# Patient Record
Sex: FEMALE | Race: WHITE | NOT HISPANIC OR LATINO | Employment: OTHER | URBAN - METROPOLITAN AREA
[De-identification: names, ages, dates, MRNs, and addresses within clinical notes are randomized per-mention and may not be internally consistent; named-entity substitution may affect disease eponyms.]

---

## 2017-07-24 ENCOUNTER — OFFICE VISIT (OUTPATIENT)
Dept: OPHTHALMOLOGY | Facility: CLINIC | Age: 62
End: 2017-07-24
Payer: COMMERCIAL

## 2017-07-24 DIAGNOSIS — E11.9 TYPE 2 DIABETES MELLITUS WITHOUT RETINOPATHY: Primary | ICD-10-CM

## 2017-07-24 DIAGNOSIS — H40.003 GLAUCOMA SUSPECT OF BOTH EYES: ICD-10-CM

## 2017-07-24 DIAGNOSIS — H52.223 REGULAR ASTIGMATISM OF BOTH EYES: ICD-10-CM

## 2017-07-24 PROCEDURE — 92133 CPTRZD OPH DX IMG PST SGM ON: CPT | Mod: S$GLB,,, | Performed by: OPTOMETRIST

## 2017-07-24 PROCEDURE — 92014 COMPRE OPH EXAM EST PT 1/>: CPT | Mod: S$GLB,,, | Performed by: OPTOMETRIST

## 2017-07-24 PROCEDURE — 92015 DETERMINE REFRACTIVE STATE: CPT | Mod: S$GLB,,, | Performed by: OPTOMETRIST

## 2017-07-24 RX ORDER — SIMVASTATIN 10 MG/1
10 TABLET, FILM COATED ORAL NIGHTLY
COMMUNITY
End: 2018-10-11

## 2017-07-24 RX ORDER — LISINOPRIL 10 MG/1
10 TABLET ORAL DAILY
COMMUNITY
End: 2018-11-19

## 2017-07-24 NOTE — PROGRESS NOTES
HPI     Diabetic Eye Exam    Additional comments: Yearly           Comments   Last seen by DNL on 7/18/16 for DM exam . Patient here today for yearly DM   exam and mOCT.  1. DM  HPI    Any vision changes since last exam: No  Eye pain: No  Other ocular symptoms: No    Do you wear currently wear glasses or contacts? Glasses    Interested in contacts today? No    Do you plan on getting new glasses today? If needed  Diabetic eye exam  Diagnosed with diabetes 10 years ago  Recent vision fluctuations No  Blood sugar: 99 this weeks           Last edited by Arianna Hunt, PCT on 7/24/2017 10:15 AM. (History)              Assessment /Plan     For exam results, see Encounter Report.    Type 2 diabetes mellitus without retinopathy  No diabetic retinopathy in either eye today. Reviewed importance of yearly dilated eye exams. Continue close care with PCP regarding diabetes.    Glaucoma suspect of both eyes  -     Posterior Segment OCT Optic Nerve- Both eyes  IOP stable today but slight change temporally OD on gOCT compared to baseline  Monitor closely  RTC for 24-2VF 1 month    Regular astigmatism of both eyes  Eyeglass Final Rx     Eyeglass Final Rx       Sphere Cylinder Axis Dist VA Add    Right +0.25 +0.75 180 20/20-1 +2.50    Left [place] +1.00 180 20/20 +2.50    Expiration Date:  7/25/2018                    RTC for 24-2VF and review, 1 month  Discussed above and all questions were answered.

## 2018-03-14 ENCOUNTER — OFFICE VISIT (OUTPATIENT)
Dept: OPHTHALMOLOGY | Facility: CLINIC | Age: 63
End: 2018-03-14
Payer: COMMERCIAL

## 2018-03-14 DIAGNOSIS — H43.819 POSTERIOR VITREOUS DETACHMENT, UNSPECIFIED LATERALITY: Primary | ICD-10-CM

## 2018-03-14 PROCEDURE — 92014 COMPRE OPH EXAM EST PT 1/>: CPT | Mod: S$GLB,,, | Performed by: OPTOMETRIST

## 2018-03-14 PROCEDURE — 99999 PR PBB SHADOW E&M-EST. PATIENT-LVL I: CPT | Mod: PBBFAC,,, | Performed by: OPTOMETRIST

## 2018-03-14 NOTE — PROGRESS NOTES
HPI     Last DNL 07/24/2017  Chief: flashes of light left eye  Onset: last night  Patient states she saw flashes of white light in the lateral corner of   vision OS that lasted for a second or two with a duration of about 30   minutes   Patient states that she had been nervous and then had eaten some greasy   pizza and later had a violent bout of vomiting   Patient states she noticed the flashes this morning upon awakening but has   not seen them as of late  Patient has not experienced any blurred vision  Patient states that left eye feels tight (dry)    Last edited by Darien Lange MA on 3/14/2018  2:27 PM. (History)            Assessment /Plan     For exam results, see Encounter Report.    Posterior vitreous detachment, unspecified laterality      PVD OS without retinal findings.  No holes, tears, or heme.  RTC 4- 6 weeks to [place] to double check her retina.  S/S of RD reviewed as emergent.

## 2018-04-23 ENCOUNTER — OFFICE VISIT (OUTPATIENT)
Dept: OPHTHALMOLOGY | Facility: CLINIC | Age: 63
End: 2018-04-23
Payer: COMMERCIAL

## 2018-04-23 DIAGNOSIS — E11.9 TYPE 2 DIABETES MELLITUS WITHOUT COMPLICATION, WITHOUT LONG-TERM CURRENT USE OF INSULIN: ICD-10-CM

## 2018-04-23 DIAGNOSIS — H43.812 POSTERIOR VITREOUS DETACHMENT OF LEFT EYE: Primary | ICD-10-CM

## 2018-04-23 PROBLEM — N18.30 CHRONIC KIDNEY DISEASE, STAGE III (MODERATE): Status: ACTIVE | Noted: 2017-08-17

## 2018-04-23 PROBLEM — I10 HYPERTENSION: Status: ACTIVE | Noted: 2018-04-23

## 2018-04-23 PROBLEM — E78.5 HYPERLIPIDEMIA: Status: ACTIVE | Noted: 2018-04-23

## 2018-04-23 PROCEDURE — 99999 PR PBB SHADOW E&M-EST. PATIENT-LVL II: CPT | Mod: PBBFAC,,, | Performed by: OPHTHALMOLOGY

## 2018-04-23 PROCEDURE — 92012 INTRM OPH EXAM EST PATIENT: CPT | Mod: S$GLB,,, | Performed by: OPHTHALMOLOGY

## 2018-04-23 NOTE — PROGRESS NOTES
===============================  04/23/2018   Juliana Forrester,   62 y.o. female   Last visit JC: :Visit date not found   Last visit eye dept. 3/14/2018  [facility]:  Corrected distance visual acuity was 20/30 in the right eye and 20/20 in the left eye.  Tonometry     Tonometry (Applanation, 3/14/18)       Right Left    Pressure 15 15               Not recorded         Not recorded        Chief Complaint   Patient presents with    flashes of light     pvd eval per mlc        HPI     flashes of light    Additional comments: pvd eval per mlc           Comments   No dm  Trace ns ou  Physiologically large discs 0.5/0.45  Pvd os       Last edited by SAHRA Dow on 4/23/2018  1:56 PM. (History)          ________________  4/23/2018  Problem List Items Addressed This Visit        Eye/Vision problems    Posterior vitreous detachment of left eye - Primary      Other Visit Diagnoses     Type 2 diabetes mellitus without complication, without long-term current use of insulin              .no holes or tears in retina  Expect resolution  Reviewed s/s RT, RD  Home number given, instructed   To call with any worsening  Otherwise rtc with Dr. Glass as scheduled.         ===========================

## 2018-08-28 ENCOUNTER — OFFICE VISIT (OUTPATIENT)
Dept: OPHTHALMOLOGY | Facility: CLINIC | Age: 63
End: 2018-08-28
Payer: COMMERCIAL

## 2018-08-28 DIAGNOSIS — H40.023 OAG (OPEN ANGLE GLAUCOMA) SUSPECT, HIGH RISK, BILATERAL: ICD-10-CM

## 2018-08-28 DIAGNOSIS — H53.411 VISUAL FIELD SCOTOMA OF RIGHT EYE: Primary | ICD-10-CM

## 2018-08-28 DIAGNOSIS — E11.9 TYPE 2 DIABETES MELLITUS WITHOUT RETINOPATHY: ICD-10-CM

## 2018-08-28 DIAGNOSIS — H52.223 REGULAR ASTIGMATISM OF BOTH EYES: ICD-10-CM

## 2018-08-28 PROCEDURE — 92014 COMPRE OPH EXAM EST PT 1/>: CPT | Mod: S$GLB,,, | Performed by: OPTOMETRIST

## 2018-08-28 PROCEDURE — 92015 DETERMINE REFRACTIVE STATE: CPT | Mod: S$GLB,,, | Performed by: OPTOMETRIST

## 2018-08-28 PROCEDURE — 99999 PR PBB SHADOW E&M-EST. PATIENT-LVL II: CPT | Mod: PBBFAC,,, | Performed by: OPTOMETRIST

## 2018-08-28 PROCEDURE — 92133 CPTRZD OPH DX IMG PST SGM ON: CPT | Mod: S$GLB,,, | Performed by: OPTOMETRIST

## 2018-08-28 PROCEDURE — 92083 EXTENDED VISUAL FIELD XM: CPT | Mod: S$GLB,,, | Performed by: OPTOMETRIST

## 2018-08-28 RX ORDER — METFORMIN HYDROCHLORIDE 500 MG/1
500 TABLET, EXTENDED RELEASE ORAL DAILY
COMMUNITY
Start: 2018-08-20 | End: 2019-11-12

## 2018-08-28 RX ORDER — LATANOPROST 50 UG/ML
1 SOLUTION/ DROPS OPHTHALMIC NIGHTLY
Qty: 1 BOTTLE | Refills: 4 | Status: SHIPPED | OUTPATIENT
Start: 2018-08-28 | End: 2018-09-06

## 2018-08-28 NOTE — PROGRESS NOTES
HPI     PTs last visit was 4/23/18 with J[facility] for PVD OS f/u. PTs last exam with   DNL was 7/24/17.   Medication eye drops if any: none  Last HVF: 8/28/18  Last gOCT: 8/28/18  Last SDP: none  Diabetic eye exam  Diagnosed with diabetes 10+ years ago  Recent vision fluctuations no  Blood sugar: 90s  HPI    Any vision changes since last exam: no  Eye pain: no  Other ocular symptoms: no    Do you wear currently wear glasses or contacts? gls    Interested in contacts today? no    Do you plan on getting new glasses today? If needed           Last edited by Zia Glass, OD on 8/30/2018  7:53 AM. (History)            Assessment /Plan     For exam results, see Encounter Report.    Visual field scotoma of right eye  -     MRI Orbits W W/O Contrast; Future; Expected date: 08/28/2018  -     MRI Brain W WO Contrast; Future; Expected date: 08/28/2018  -     Creatinine, serum; Future; Expected date: 08/28/2018    OAG (open angle glaucoma) suspect, high risk, bilateral  -     Posterior Segment OCT Optic Nerve- Both eyes  -     Hill Visual Field - OU - Extended - Both Eyes    Type 2 diabetes mellitus without retinopathy  No diabetic retinopathy OD, OS  Continue close care with PCP  Monitor 12 months    Regular astigmatism of both eyes  Eyeglass Final Rx     Eyeglass Final Rx       Sphere Cylinder Axis Dist VA Add    Right +0.25 +1.00 008 20/50 +2.50    Left [place] +1.00 178 20/20 +2.50    Expiration Date:  8/29/2019                Other orders  -     latanoprost 0.005 % ophthalmic solution; Place 1 drop into both eyes every evening.  Dispense: 1 Bottle; Refill: 4      New VF defect OD (right monocular temporal hemianopia), respects vertical midline. VF reliable.   OS: no defects    VF defect corresponds with GCC thinning on gOCT  Consider normal tension glaucoma, but imaging recommended due to VF pattern: r/o pre chiasmal optic nerve abnormalities    Start latanoprost qhs OU  MRI scheduled  RTC 1 month for IOP check or PRN  with any problems

## 2018-09-05 ENCOUNTER — TELEPHONE (OUTPATIENT)
Dept: RADIOLOGY | Facility: HOSPITAL | Age: 63
End: 2018-09-05

## 2018-09-05 NOTE — TELEPHONE ENCOUNTER
----- Message from Oneida Bowen, RT sent at 9/5/2018  3:29 PM CDT -----  Contact: Radiology  Patient has a MRI test is scheduled for tomorrow, and we need to allow time for insurance approval, is the test medically urgent? if not medically urgent we may need to re-schedule to allow Insurance time to approve test. If you have any questions call ext [phone].  Thanks  Oneida, Radiology

## 2018-09-05 NOTE — TELEPHONE ENCOUNTER
Attempted to call pt and fill out questionnaire so we could resend her MRI order. Annabelle will need to send a new order first thing in the morning with the priority changed to stat instead of routine.

## 2018-09-06 ENCOUNTER — HOSPITAL ENCOUNTER (OUTPATIENT)
Dept: RADIOLOGY | Facility: HOSPITAL | Age: 63
Discharge: HOME | End: 2018-09-06
Attending: OPTOMETRIST
Payer: COMMERCIAL

## 2018-09-06 ENCOUNTER — TELEPHONE (OUTPATIENT)
Dept: OPHTHALMOLOGY | Facility: CLINIC | Age: 63
End: 2018-09-06

## 2018-09-06 DIAGNOSIS — H53.411 VISUAL FIELD SCOTOMA OF RIGHT EYE: ICD-10-CM

## 2018-09-06 DIAGNOSIS — H53.419 VISUAL FIELD SCOTOMA, UNSPECIFIED LATERALITY: Primary | ICD-10-CM

## 2018-09-06 PROCEDURE — 70543 MRI ORBT/FAC/NCK W/O &W/DYE: CPT | Mod: 26,,, | Performed by: RADIOLOGY

## 2018-09-06 PROCEDURE — 70543 MRI ORBT/FAC/NCK W/O &W/DYE: CPT | Mod: TC,PO

## 2018-09-06 PROCEDURE — 25500020 PHARM REV CODE 255: Mod: PO | Performed by: OPTOMETRIST

## 2018-09-06 PROCEDURE — A9585 GADOBUTROL INJECTION: HCPCS | Mod: PO | Performed by: OPTOMETRIST

## 2018-09-06 RX ORDER — GADOBUTROL 604.72 MG/ML
9.5 INJECTION INTRAVENOUS
Status: COMPLETED | OUTPATIENT
Start: 2018-09-06 | End: 2018-09-06

## 2018-09-06 RX ADMIN — GADOBUTROL 9.5 ML: 604.72 INJECTION INTRAVENOUS at 09:09

## 2018-09-07 ENCOUNTER — TELEPHONE (OUTPATIENT)
Dept: ENDOCRINOLOGY | Facility: CLINIC | Age: 63
End: 2018-09-07

## 2018-09-07 NOTE — TELEPHONE ENCOUNTER
Attempted to phone patient back but no answer. Will leave a message to have the nurse return her call to get an appointment scheduled when she returns on Monday.

## 2018-09-07 NOTE — TELEPHONE ENCOUNTER
----- Message from Kelsey Dobbins MA sent at 9/7/2018  8:38 AM CDT -----  This patient was seen with Dr. Glass in ophthalmology. A Sellar/suprasellar mass detected on her MRI this week and Dr. Glass would like for her to be seen with Dr. Rodolfo MENDOZA. Can you please contact patient and get her scheduled for a consult. Thanks in advance.

## 2018-09-10 ENCOUNTER — TELEPHONE (OUTPATIENT)
Dept: ENDOCRINOLOGY | Facility: CLINIC | Age: 63
End: 2018-09-10

## 2018-09-10 ENCOUNTER — OFFICE VISIT (OUTPATIENT)
Dept: NEUROSURGERY | Facility: CLINIC | Age: 63
End: 2018-09-10
Payer: COMMERCIAL

## 2018-09-10 VITALS
BODY MASS INDEX: 36.43 KG/M2 | WEIGHT: 218.63 LBS | SYSTOLIC BLOOD PRESSURE: 158 MMHG | HEART RATE: 91 BPM | DIASTOLIC BLOOD PRESSURE: 87 MMHG | HEIGHT: 65 IN | TEMPERATURE: 98 F

## 2018-09-10 DIAGNOSIS — D35.2 PITUITARY ADENOMA: ICD-10-CM

## 2018-09-10 PROCEDURE — 99244 OFF/OP CNSLTJ NEW/EST MOD 40: CPT | Mod: S$GLB,,, | Performed by: NEUROLOGICAL SURGERY

## 2018-09-10 PROCEDURE — 99999 PR PBB SHADOW E&M-EST. PATIENT-LVL III: CPT | Mod: PBBFAC,,, | Performed by: NEUROLOGICAL SURGERY

## 2018-09-10 NOTE — LETTER
September 12, 2018      Zia Glass, OD  [address] [place] [address]           [facility] - Neurosurgery 7th Fl  [address]  [place] [address]  Phone: [phone]          Patient: Juliana Forrester   MR Number: [id number]   YOB: 1955   Date of Visit: 9/10/2018       Dear Dr. Zia Glass:    Thank you for referring Juliana Forrester to me for evaluation. Attached you will find relevant portions of my assessment and plan of care.    If you have questions, please do not hesitate to call me. I look forward to following Juliana Forrester along with you.    Sincerely,    Racquel Mcadams MD    Enclosure  CC:  No Recipients    If you would like to receive this communication electronically, please contact [email or web address] or [phone] to request more information on [employer] Link access.    For providers and/or their staff who would like to refer a patient to [facility], please contact us through our one-stop-shop provider referral line, [facility] , at [phone].    If you feel you have received this communication in error or would no longer like to receive these types of communications, please e-mail [email or web address]

## 2018-09-12 ENCOUNTER — PATIENT MESSAGE (OUTPATIENT)
Dept: OPHTHALMOLOGY | Facility: CLINIC | Age: 63
End: 2018-09-12

## 2018-09-12 PROBLEM — D35.2 PITUITARY ADENOMA: Status: ACTIVE | Noted: 2018-09-12

## 2018-09-12 NOTE — H&P (VIEW-ONLY)
History & Physical    SUBJECTIVE:     Chief Complaint:  Visual field deficit.    History of Present Illness:  Ms. Forrester is a 62-year-old female who was referred to me by Dr. Tayo Glass.  Her past medical history is significant for hypertension, diabetes, hyperlipidemia, stage 3 chronic kidney disease, and posterior vitreous detachment of the left eye.  She states that she was going for her yearly eye exam and on examination her eye doctor found a visual field cut in the right eye which was not consistent with her previous history of vitreous detachment in the left eye.  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  The patient was therefore referred to me for evaluation and treatment.  She states that in retrospect, she does feel that her vision has been off.  She denies blurry vision or double vision but feels that there may be a defect in her peripheral vision.  She denies increased fatigue, unintentional weight gain, easy bruisability, abdominal striae, change in hand or shoe size, increased spacing of her teeth, galactorrhea, irregular menses, increased thirst, or increased urination.    Review of patient's allergies indicates:  No Known Allergies    Current Outpatient Medications   Medication Sig Dispense Refill    buPROPion (WELLBUTRIN XL) 150 MG TB24 tablet Take 150 mg by mouth once daily.  11    lisinopril 10 MG tablet Take 10 mg by mouth once daily.      metFORMIN (GLUCOPHAGE-XR) 500 MG 24 hr tablet Take 500 mg by mouth.      multivitamin capsule Take 1 capsule by mouth.      simvastatin (ZOCOR) 10 MG tablet Take 10 mg by mouth every evening.       No current facility-administered medications for this visit.        Past Medical History:   Diagnosis Date    Cataract     Hypertension      History reviewed. No pertinent surgical history.  Family History     Problem Relation (Age of Onset)    Cataracts Mother        Social History      Socioeconomic History    Marital status: Single     Spouse name: None    Number of children: None    Years of education: None    Highest education level: None   Social Needs    Financial resource strain: None    Food insecurity - worry: None    Food insecurity - inability: None    Transportation needs - medical: None    Transportation needs - non-medical: None   Occupational History    None   Tobacco Use    Smoking status: Never Smoker    Smokeless tobacco: Never Used   Substance and Sexual Activity    Alcohol use: No    Drug use: No    Sexual activity: None   Other Topics Concern    None   Social History Narrative    None       Review of Systems:  Review of Systems   Constitutional: Negative for activity change, diaphoresis, fatigue, fever and unexpected weight change.   HENT: Negative for hearing loss, nosebleeds, tinnitus and trouble swallowing.    Eyes: Positive for visual disturbance.   Respiratory: Positive for shortness of breath. Negative for cough and wheezing.    Cardiovascular: Negative for chest pain and palpitations.   Gastrointestinal: Negative for abdominal distention, abdominal pain, blood in stool, constipation, diarrhea, nausea and vomiting.   Genitourinary: Positive for dysuria. Negative for difficulty urinating, frequency and urgency.   Musculoskeletal: Negative for back pain, gait problem, joint swelling, myalgias, neck pain and neck stiffness.   Skin: Negative for color change, rash and wound.   Allergic/Immunologic: Negative for environmental allergies and food allergies.   Neurological: Negative for dizziness, seizures, facial asymmetry, speech difficulty, weakness, light-headedness, numbness and headaches.   Hematological: Does not bruise/bleed easily.   Psychiatric/Behavioral: Negative for agitation, behavioral problems, dysphoric mood and hallucinations. The patient is nervous/anxious.        OBJECTIVE:     Vital Signs  Temp: 98 °F (36.7 °C)  Pulse: 91  BP: (!)  "158/87  Pain Score: 0-No pain  Height: 5' 5" (165.1 cm)  Weight: 99.2 kg (218 lb 9.6 oz)  Body mass index is 36.38 kg/m².      Physical Exam:  Physical Exam:  Vitals reviewed.    Constitutional: She appears well-developed and well-nourished. No distress.     Eyes: Pupils are equal, round, and reactive to light. Conjunctivae and EOM are normal.     Cardiovascular: Normal rate, regular rhythm, normal pulses and no edema.     Abdominal: Soft. Bowel sounds are normal.     Skin: Skin displays no rash on trunk and no rash on extremities. Skin displays no lesions on trunk and no lesions on extremities.     Psych/Behavior: She is alert. She is oriented to person, place, and time. She has a normal mood and affect.     Musculoskeletal: Gait is normal.        Neck: Range of motion is full. There is no tenderness. Muscle strength is 5/5. Tone is normal.        Back: Range of motion is full. There is no tenderness. Muscle strength is 5/5. Tone is normal.        Right Upper Extremities: Range of motion is full. There is no tenderness. Muscle strength is 5/5. Tone is normal.        Left Upper Extremities: Range of motion is full. There is no tenderness. Muscle strength is 5/5. Tone is normal.       Right Lower Extremities: Range of motion is full. There is no tenderness. Muscle strength is 5/5. Tone is normal.        Left Lower Extremities: Range of motion is full. There is no tenderness. Muscle strength is 5/5. Tone is normal.     Neurological:        Coordination: She has a normal Romberg Test, normal finger to nose coordination and normal tandem walking coordination.        Sensory: There is no sensory deficit in the trunk. There is no sensory deficit in the extremities.        DTRs: DTRs are DTRS NORMAL AND SYMMETRICnormal and symmetric. She displays no Babinski's sign on the right side. She displays no Babinski's sign on the left side.        Cranial nerves: Cranial nerve(s) II, III, IV, V, VI, VII, VIII, IX, X, XI and XII " are intact.         Diagnostic Results:  She is an MRI of the brain available for review which I personally reviewed.  This shows a sellar and suprasellar mass with extension into the cavernous sinus on the left.  The tumor surrounds the ICA on the left.  It is homogeneously enhancing.  It measures approximately 3.7 x 2.3 x 2.5 cm.  There is abutment any basement of the optic chiasm.    ASSESSMENT/PLAN:     Ms. 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  Barring this being a pituitary prolactinoma, given the mass effect on the optic chiasm in visual field changes on formal examination which are consistent with a right min ocular temporal hemianopia, the patient needs surgery to alleviate the pressure on the optic chiasm.  If this does turn out to be a pituitary prolactinoma, we will treat with a course of bromocriptine 1st.  Otherwise, I believe the patient would benefit from a transnasal, transsphenoidal resection of pituitary adenoma with possible abdominal fat graft.  I explained the procedure in detail to the patient as well as the risks and benefits and present cons.  The patient wishes to proceed at this point.  We will get all the appropriate preoperative testing and schedule surgery in the near future.  She knows she can call with any further questions or concerns.        Note dictated with voice recognition software, please excuse any grammatical errors.

## 2018-09-13 ENCOUNTER — TELEPHONE (OUTPATIENT)
Dept: NEUROSURGERY | Facility: CLINIC | Age: 63
End: 2018-09-13

## 2018-09-13 ENCOUNTER — PATIENT MESSAGE (OUTPATIENT)
Dept: OPHTHALMOLOGY | Facility: CLINIC | Age: 63
End: 2018-09-13

## 2018-09-13 DIAGNOSIS — D35.2 PITUITARY MACROADENOMA: Primary | ICD-10-CM

## 2018-09-20 ENCOUNTER — TELEPHONE (OUTPATIENT)
Dept: NEUROSURGERY | Facility: CLINIC | Age: 63
End: 2018-09-20

## 2018-09-20 NOTE — TELEPHONE ENCOUNTER
----- Message from Keara Conrad sent at 9/20/2018  1:30 PM CDT -----  Contact: Dr. Pratt [phone]  Needs Advice    Reason for call: Dr. Flacon called to speak to someone regarding the patient's prolonged PTT.She would like to get her set up Hematology to clear her for surgery. Please contact Dr. Ibarra first thing tomorrow to discuss further.          Communication Preference:PHONE     Additional Information:

## 2018-09-21 ENCOUNTER — LAB VISIT (OUTPATIENT)
Dept: LAB | Facility: HOSPITAL | Age: 63
End: 2018-09-21
Attending: PHYSICIAN ASSISTANT
Payer: COMMERCIAL

## 2018-09-21 DIAGNOSIS — R79.1 ELEVATED PARTIAL THROMBOPLASTIN TIME (PTT): ICD-10-CM

## 2018-09-21 LAB
APTT BLDCRRT: 35.9 SEC
INR PPP: 1
PROTHROMBIN TIME: 10.1 SEC

## 2018-09-21 PROCEDURE — 85732 THROMBOPLASTIN TIME PARTIAL: CPT

## 2018-09-21 PROCEDURE — 85730 THROMBOPLASTIN TIME PARTIAL: CPT

## 2018-09-21 PROCEDURE — 85610 PROTHROMBIN TIME: CPT

## 2018-09-21 PROCEDURE — 36415 COLL VENOUS BLD VENIPUNCTURE: CPT

## 2018-09-24 ENCOUNTER — INITIAL CONSULT (OUTPATIENT)
Dept: HEMATOLOGY/ONCOLOGY | Facility: CLINIC | Age: 63
End: 2018-09-24
Payer: COMMERCIAL

## 2018-09-24 ENCOUNTER — OFFICE VISIT (OUTPATIENT)
Dept: ENDOCRINOLOGY | Facility: CLINIC | Age: 63
End: 2018-09-24
Payer: COMMERCIAL

## 2018-09-24 ENCOUNTER — LAB VISIT (OUTPATIENT)
Dept: LAB | Facility: HOSPITAL | Age: 63
End: 2018-09-24
Payer: COMMERCIAL

## 2018-09-24 VITALS
HEIGHT: 65 IN | BODY MASS INDEX: 36.29 KG/M2 | OXYGEN SATURATION: 98 % | WEIGHT: 217.81 LBS | HEART RATE: 81 BPM | SYSTOLIC BLOOD PRESSURE: 132 MMHG | TEMPERATURE: 98 F | DIASTOLIC BLOOD PRESSURE: 72 MMHG | RESPIRATION RATE: 18 BRPM

## 2018-09-24 VITALS
HEIGHT: 65 IN | WEIGHT: 217.38 LBS | BODY MASS INDEX: 36.22 KG/M2 | SYSTOLIC BLOOD PRESSURE: 128 MMHG | DIASTOLIC BLOOD PRESSURE: 79 MMHG | HEART RATE: 72 BPM

## 2018-09-24 DIAGNOSIS — R79.1 ELEVATED PARTIAL THROMBOPLASTIN TIME (PTT): Primary | ICD-10-CM

## 2018-09-24 DIAGNOSIS — Z01.818 PRE-OPERATIVE CLEARANCE: ICD-10-CM

## 2018-09-24 DIAGNOSIS — E11.9 TYPE 2 DIABETES MELLITUS WITHOUT RETINOPATHY: ICD-10-CM

## 2018-09-24 DIAGNOSIS — E78.5 HYPERLIPIDEMIA, UNSPECIFIED HYPERLIPIDEMIA TYPE: ICD-10-CM

## 2018-09-24 DIAGNOSIS — R79.1 ELEVATED PARTIAL THROMBOPLASTIN TIME (PTT): ICD-10-CM

## 2018-09-24 DIAGNOSIS — D35.2 PITUITARY MACROADENOMA: Primary | ICD-10-CM

## 2018-09-24 DIAGNOSIS — I10 HYPERTENSION, UNSPECIFIED TYPE: ICD-10-CM

## 2018-09-24 LAB — APTT BLDCRRT: 29.2 SEC

## 2018-09-24 PROCEDURE — 36415 COLL VENOUS BLD VENIPUNCTURE: CPT

## 2018-09-24 PROCEDURE — 99204 OFFICE O/P NEW MOD 45 MIN: CPT | Mod: S$GLB,,, | Performed by: INTERNAL MEDICINE

## 2018-09-24 PROCEDURE — 85730 THROMBOPLASTIN TIME PARTIAL: CPT

## 2018-09-24 PROCEDURE — 99999 PR PBB SHADOW E&M-EST. PATIENT-LVL III: CPT | Mod: PBBFAC,,, | Performed by: INTERNAL MEDICINE

## 2018-09-24 RX ORDER — LEVOTHYROXINE SODIUM 50 UG/1
50 TABLET ORAL DAILY
Qty: 30 TABLET | Refills: 11 | Status: SHIPPED | OUTPATIENT
Start: 2018-09-24 | End: 2018-10-25 | Stop reason: SDUPTHER

## 2018-09-24 NOTE — LETTER
September 24, 2018      Racquel Mcadams MD  [address]  [facility] [address]           [facility]  [address]  [facility] [address]  Phone: [phone]          Patient: Juliana Forrester   MR Number: [id number]   YOB: 1955   Date of Visit: 9/24/2018       Dear Dr. Racquel Mcadams:    Thank you for referring Juliana Forrester to me for evaluation. Attached you will find relevant portions of my assessment and plan of care.    If you have questions, please do not hesitate to call me. I look forward to following Juliana Forrester along with you.    Sincerely,    Juve Youssef MD    Enclosure  CC:  No Recipients    If you would like to receive this communication electronically, please contact [email or web address] or [phone] to request more information on [employer] Link access.    For providers and/or their staff who would like to refer a patient to [facility], please contact us through our one-stop-shop provider referral line, [facility] [place], at [phone].    If you feel you have received this communication in error or would no longer like to receive these types of communications, please e-mail [email or web address]

## 2018-09-24 NOTE — LETTER
September 24, 2018      Zia Glass, OD  [address] [place] [address]           [facility]  [address]  [facility] [address]  Phone: [phone]  Fax: [phone]          Patient: Juliana Forrester   MR Number: [id number]   YOB: 1955   Date of Visit: 9/24/2018       Dear Dr. Zia Glass:    Thank you for referring Juliana Forrester to me for evaluation. Attached you will find relevant portions of my assessment and plan of care.    If you have questions, please do not hesitate to call me. I look forward to following Juliana Forrester along with you.    Sincerely,    Reanna Alves MD    Enclosure  CC:  No Recipients    If you would like to receive this communication electronically, please contact [email or web address] or [phone] to request more information on [employer] Link access.    For providers and/or their staff who would like to refer a patient to [facility], please contact us through our one-stop-shop provider referral line, [facility], at [phone].    If you feel you have received this communication in error or would no longer like to receive these types of communications, please e-mail [email or web address]

## 2018-09-24 NOTE — PROGRESS NOTES
SECTION OF HEMATOLOGY AND BONE MARROW TRANSPLANT  New Patient Visit   09/24/2018  Referred by:  Dr. Racquel Mcadams  Referred for:      CHIEF COMPLAINT: No chief complaint on file.      HISTORY OF PRESENT ILLNESS:   61 yo female, referred for pre op clearance prior to removal of pituitary macroadenoma by neurosurgery (Dr. Mcadams).  Diagnosed after presenting with vision changes.    Otherwise asymptomatic.    She was noted to have minimally elevated aPTT and normal INR.    A repeat aPTT done this am was normal.   She gives no personal or family history of bleeding. She has cholecystectomy 2 years ago with no abnormal bleeding.  Regular dental care with no bleeding.    She does not take any anticoagulants or antiplatelet agents .   Denies fever, chills, nightsweats, bleeding, brusing, lymphadenopathy, signs/symptoms of splenomegaly.    She comes to clinic with her sister.       PAST MEDICAL HISTORY:   Past Medical History:   Diagnosis Date    Cataract     Hypertension     Pituitary adenoma 9/12/2018       PAST SURGICAL HISTORY:   No past surgical history on file.    PAST SOCIAL HISTORY:   reports that  has never smoked. she has never used smokeless tobacco. She reports that she does not drink alcohol or use drugs.    FAMILY HISTORY:  Family History   Problem Relation Age of Onset    Cataracts Mother        CURRENT MEDICATIONS:   Current Outpatient Medications   Medication Sig    levothyroxine (SYNTHROID) 50 MCG tablet Take 1 tablet (50 mcg total) by mouth once daily.    lisinopril 10 MG tablet Take 10 mg by mouth once daily.    metFORMIN (GLUCOPHAGE-XR) 500 MG 24 hr tablet Take 500 mg by mouth.    multivitamin capsule Take 1 capsule by mouth.    simvastatin (ZOCOR) 10 MG tablet Take 10 mg by mouth every evening.     No current facility-administered medications for this visit.      ALLERGIES:   Review of patient's allergies indicates:  No Known Allergies          REVIEW OF SYSTEMS:   General ROS:  negative  Psychological ROS: negative  Ophthalmic ROS: see HPI  ENT ROS: negative  Allergy and Immunology ROS: negative  Hematological and Lymphatic ROS: negative  Endocrine ROS: negative  Respiratory ROS: negative  Cardiovascular ROS: negative  Gastrointestinal ROS: negative  Genito-Urinary ROS: negative  Musculoskeletal ROS: negative  Neurological ROS: negative  Dermatological ROS: negative    PHYSICAL EXAM:   Vitals:    09/24/18 1306   BP: 132/72   Pulse: 81   Resp: 18   Temp: 97.9 °F (36.6 °C)       General - well developed, well nourished, no apparent distress  Head & Face - no sinus tenderness  Eyes - normal conjunctivae and lids   ENT - normal external auditory canals and tympanic membranes bilaterally oropharynx clear,  Normal dentition and gums  Neck - normal thyroid  Chest and Lung - normal respiratory effort, clear to auscultation bilaterally   Cardiovascular - RRR with no MGR, normal S1 and S2; no pedal edema  Abdomen -  soft, nontender, no palpable hepatomegaly or splenomegaly  Lymph - no palpable lymphadenopathy  Extremities - unremarkable nails and digits  Heme - no bruising, petechiae, pallor  Skin - no rashes or lesions  Psych - appropriate mood and affect      ECOG Performance Status: (foot note - ECOG PS provided by Eastern Cooperative Oncology Group) 1 - Symptomatic but completely ambulatory    Karnofsky Performance Score:  90%- Able to Carry on Normal Activity: Minor Symptoms of Disease  DATA:   No results found for: WBC, HGB, HCT, MCV, PLT  Lab Results   Component Value Date    INR 1.0 09/21/2018     Lab Results   Component Value Date    APTT 29.2 09/24/2018       Normal cbc  -9/18/18 -care everywhere     ASSESSMENT AND PLAN:   Encounter Diagnoses   Name Primary?    Elevated partial thromboplastin time (PTT) Yes    Pre-operative clearance      -unclear etiology of prior negligibly increased aPTT; normal INR; normal plt count  -aPTT now normalized on today's labs; this in setting of normal CBC  (please see care everywhere), no significant personal or family history of bleeding, prior surgeries without bleeding, it is  highly unlikely patient has any clinically meaningful bleeding diathesis  -she can proceed with neurosurgery with no further diagnostics/therapuetics     Follow Up: CHAPIN Youssef MD  Hematology/Oncology/Bone Marrow Transplant

## 2018-09-24 NOTE — PROGRESS NOTES
"ENDOCRINOLOGY INITIAL VISIT    Juliana Forrester is a 62 y.o. female referred by Dr. 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    HPI  During annual eye exam she was noted to have right visual field deficit which prompted MRI. This revealed large sellar mass with suprasellar extension abutting the chiasm. She notes feeling "claustrophobic" while driving recently but otherwise had not previously noted change in vision.     She has been seen by Dr. Mcadams in neurosurgery who recommends surgery given visual deficits as long as mass not found to be prolactinoma. This is tentatively planned for Wednesday of this week although she was also found to have prolonged PTT so is having evaluation by hematology as well.     Has been feeling well recently and reports only an increase in sweating particularly overnight.    Denied weight changes, appetite changes, fatigue, constipation/diarrhea. She is postmenopausal and never had significant symptoms from menopause    Denied change in facial appearance, change in ring/hat/shoe size, snoring. If anything rings have been a bit lose lately    Denied cold/heat intolerance, skin/hair changes, muscle aches +sweating as above    Denied orthostatic symptoms. Has vomitting if eats fried food but this developed following cholecystectomy and is unchanged     Denied facial edema/erythema, hirsutism/acne/voice deepening, striae/bruising    Denied galactorrhea, polyuria/polydipsia, denies unusual headaches, + right-sided peripheral vision loss. Has noted increased in floaters      MEDICATION    Current Outpatient Medications:     lisinopril 10 MG tablet, Take 10 mg by mouth once daily., Disp: , Rfl:     metFORMIN (GLUCOPHAGE-XR) 500 MG 24 hr tablet, Take 500 mg by mouth., Disp: , Rfl:     multivitamin capsule, Take 1 capsule by mouth., Disp: , Rfl:     simvastatin (ZOCOR) 10 MG tablet, Take 10 mg by mouth every evening., Disp: , Rfl:     levothyroxine (SYNTHROID) 50 MCG " "tablet, Take 1 tablet (50 mcg total) by mouth once daily., Disp: 30 tablet, Rfl: 11    ALLERGIES  Review of patient's allergies indicates:  No Known Allergies      PAST MEDICAL HISTORY  HTN  HLD  T2DM    FAMILY HISTORY  Denies family history of pituitary tumor    SOCIAL HISTORY  Denies tobacco, ETOH, illicits  Speech pathologist, worked at the [employer] in [place]    PHYSICAL EXAM  /79 (BP Location: Right arm, Patient Position: Sitting, BP Method: Medium (Automatic))   Pulse 72   Ht 5' 5" (1.651 m)   Wt 98.6 kg (217 lb 6 oz)   BMI 36.17 kg/m²   Body mass index is 36.17 kg/m².  General Appearance:  Normal appearing, pleasant, NAD  Skin:  no striae or rashes  HEENT:  EOMI, MMM, sclera anicteric   Chest and Lungs:  CTAB, no wheezes/rales/rhonchi; bilateral breasts without galactorrhea  Cardiovascular:  RRR, nml S1, S2.  No lower extremity edema  Abdomen:  soft, nontender, nondistended, bowel sounds normoactive  MSK: +buffalo hump, no dorsocervical fat pads  Neurologic:  A&O x3  Psychiatric:  normal mood and affect    LABORATORY  9/18/18:  24 hr urine cortisol 31.0   Cortisol 13.58  ACTH 16  Prolactin 65.2  IGF-1 85  FT4 0.88  TSH 1.13  Sodium 142      IMAGING STUDIES  MR orbits 8/28/18:  Orbits/Optic Nerves:  Both globes are intact.  No abnormal enhancement associated with either globe.  The optic nerves are symmetric in size and signal.  No intraconal or extraconal mass lesions are identified.    There is a large mass that demonstrates homogeneous enhancement occupying the sella that demonstrates suprasellar extension and results in at least 360° of encasement of the cavernous portion of the left and 180 -270° of abutment of the supraclinoid portion of the left ICA.  The mass measures a maximum of 3.7 by 2.3 cm in the axial plane and up to 2.5 cm in the craniocaudal dimension.  The mass results in abutment and effacement of the optic chiasm.  Findings are suspicious for a large macroadenoma with the " possibility of a craniopharyngioma or other sellar/suprasellar lesions not completely excluded.    Remainder of the Intracranial Compartment (limited evaluation): No diffusion weighted abnormalities noted.  The entire brain was not included.    Skull/Extracranial Contents (limited evaluation): Bone marrow signal intensity is normal.  1. Large sellar/suprasellar mass as described in detail above.    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 Pending completion of endocrinologic and hematologic evaluation she will undergo surgery later this week given visual symptoms.    Elevated prolactin  Prolactin mildly elevated. This is likely due to stalk compression as would expect much higher value given size of mass. Will also run with dilution to rule out Hook effect  -Repeat prolactin with dilution  -If prolactin significantly elevated consistent with prolactinoma, will start treatment with dopamine agonist  -If prolactin again only mildly elevated, recommend proceeding with surgical resection    HPA  -No evidence of adrenal insufficiency or excess    HPT  -FT4 mildly low, TSH not reliable in this context  -Start levothyroxine 50 mcg daily. Counseled on appropriate manner in which to take  -Repeat following surgery with dose titration as indicated    HPG  Postmenopausal  -Check LH/FSH    GH  IGF-1 normal. No evidence of acromegaly clinically or biochemically    DI  Sodium normal, no polyuria and polydipsia    RTC 8 weeks following surgery    Reanna Alves MD    [phone] (c) with lab results

## 2018-09-24 NOTE — PROGRESS NOTES
Prolactin run with dilution 1:100 returned at 60, unchanged from baseline. Thus not consistent with prolactinoma and patient can proceed with surgery    Called patient and relayed this result

## 2018-09-25 ENCOUNTER — ANESTHESIA EVENT (OUTPATIENT)
Dept: SURGERY | Facility: HOSPITAL | Age: 63
DRG: 614 | End: 2018-09-25
Payer: COMMERCIAL

## 2018-09-25 NOTE — PRE-PROCEDURE INSTRUCTIONS
PreOp Instructions given TO pt's sister - Terri:  - Verbal medication information (what to hold and what to take)   - NPO guidelines   - Arrival place directions given; time to be given the day before procedure by the   Surgeon's Office   - Bathing with antibacterial soap   - Don't wear any jewelry or bring any valuables AM of surgery   - No makeup or moisturizer to face   - No perfume/cologne, powder, lotions or aftershave   Pt.'s sister, Terri, verbalized understanding.   Denies any history of side effects or issues with anesthesia or sedation.

## 2018-09-25 NOTE — ANESTHESIA PREPROCEDURE EVALUATION
[facility]  Anesthesia Pre-Operative Evaluation         Patient Name: Juliana Forrester  YOB: 1955  MRN: [id number]    SUBJECTIVE:     Pre-operative evaluation for Procedure(s) (LRB):  RESECTION, NEOPLASM, PITUITARY, TRANSSPHENOIDAL APPROACH (N/A)  Scheduled for 9/26/2018    HPI 09/25/2018:  Juliana Forrester is a 62 y.o. female with hx of HTN, HLD, DM2, CKD3, vitreous detachment of L eye.    Patient was last seen in clinic by Dr. Mcadams on 9/12/2018 for pituitary adenoma.  There is a visual field defect in R eye.  No blurry vision or double vision but possible defect in peripheral vision.  Imaging shows a large sellar and suprasellar homogenously enhancing lesion with pressure on optic chiasm.  Endocrine studies not consistent with prolactinoma.    Patient presents for the above procedure(s).    Prev airway:   No prior records in Epic.    Oxygen/Ventilation Requirements:  On room air with SpO2 of 98%       Patient Active Problem List   Diagnosis    Type 2 diabetes mellitus without retinopathy    Chronic kidney disease, stage III (moderate)    Hypertension    Hyperlipidemia    Posterior vitreous detachment of left eye    Pituitary macroadenoma    Elevated partial thromboplastin time (PTT)       Review of patient's allergies indicates:  No Known Allergies    Outpatient Medications:  No current facility-administered medications on file prior to encounter.      Current Outpatient Medications on File Prior to Encounter   Medication Sig Dispense Refill    lisinopril 10 MG tablet Take 10 mg by mouth once daily.      metFORMIN (GLUCOPHAGE-XR) 500 MG 24 hr tablet Take 500 mg by mouth.      multivitamin capsule Take 1 capsule by mouth.      simvastatin (ZOCOR) 10 MG tablet Take 10 mg by mouth every evening.          Current Inpatient Medications:      No past surgical history on file.    Social History     Socioeconomic History    Marital status: Single     Spouse name: Not on file     Number of children: Not on file    Years of education: Not on file    Highest education level: Not on file   Social Needs    Financial resource strain: Not on file    Food insecurity - worry: Not on file    Food insecurity - inability: Not on file    Transportation needs - medical: Not on file    Transportation needs - non-medical: Not on file   Occupational History    Not on file   Tobacco Use    Smoking status: Never Smoker    Smokeless tobacco: Never Used   Substance and Sexual Activity    Alcohol use: No    Drug use: No    Sexual activity: Not on file   Other Topics Concern    Not on file   Social History Narrative    Not on file       OBJECTIVE:     Weight:  Wt Readings from Last 4 Encounters:   09/24/18 98.8 kg (217 lb 13 oz)   09/24/18 98.6 kg (217 lb 6 oz)   09/10/18 99.2 kg (218 lb 9.6 oz)       Vital Signs Range (Last 24H):         CBC:   No results found for: WBC, HGB, HCT, MCV, PLT    CMP:     Chemistry        Component Value Date/Time    CREATININE 1.2 09/06/2018 0723        Component Value Date/Time    ESTGFRAFRICA 56 (A) 09/06/2018 0723    EGFRNONAA 48 (A) 09/06/2018 0723            INR:  Lab Results   Component Value Date    INR 1.0 09/21/2018       Diagnostic Studies:  MRI Orbits w/wo Contrast 9/6/2018  There is a large mass that demonstrates homogeneous enhancement occupying the sella that demonstrates suprasellar extension and results in at least 360° of encasement of the cavernous portion of the left and 180 -270° of abutment of the supraclinoid portion of the left ICA.  The mass measures a maximum of 3.7 by 2.3 cm in the axial plane and up to 2.5 cm in the craniocaudal dimension.  The mass results in abutment and effacement of the optic chiasm.  Findings are suspicious for a large macroadenoma with the possibility of a craniopharyngioma or other sellar/suprasellar lesions not completely excluded.    EKG:  none     2D Echo:  No results found for this or any previous  visit.      ASSESSMENT/PLAN:         Anesthesia Evaluation    I have reviewed the Patient Summary Reports.    I have reviewed the Nursing Notes.   I have reviewed the Medications.     Review of Systems  Anesthesia Hx:  No previous Anesthesia  Neg history of prior surgery. Denies Family Hx of Anesthesia complications.   Denies Personal Hx of Anesthesia complications.   Social:  Non-Smoker, No Alcohol Use    EENT/Dental:EENT/Dental Normal   Cardiovascular:   Exercise tolerance: good Hypertension Denies Valvular problems/Murmurs.  Denies MI.  Denies CAD.    Denies CABG/stent.  Denies Dysrhythmias.   Denies Angina.     hyperlipidemia    Pulmonary:  Pulmonary Normal    Renal/:   Chronic Renal Disease, CRI    Hepatic/GI:  Hepatic/GI Normal    Musculoskeletal:  Musculoskeletal Normal    Neurological:   Denies CVA. Denies Seizures. Pituitary adenoma   Endocrine:   Diabetes    Psych:  Psychiatric Normal           Physical Exam  General:  Well nourished, Obesity    Airway/Jaw/Neck:  Airway Findings: Mouth Opening: Normal Tongue: Normal  General Airway Assessment: Adult  Mallampati: III  TM Distance: Normal, at least 6 cm  Jaw/Neck Findings:  Neck ROM: Normal ROM  Neck Findings: Normal    Eyes/Ears/Nose:  EYES/EARS/NOSE FINDINGS: Normal   Dental:  Dental Findings: In tact   Chest/Lungs:  Chest/Lungs Findings: Clear to auscultation, Normal Respiratory Rate     Heart/Vascular:  Heart Findings: Rate: Normal  Rhythm: Regular Rhythm  Sounds: Normal  Heart murmur: negative       Mental Status:  Mental Status Findings:  Cooperative, Alert and Oriented         Anesthesia Plan  Type of Anesthesia, risks & benefits discussed:  Anesthesia Type:  general  Patient's Preference:   Intra-op Monitoring Plan: standard ASA monitors and arterial line  Intra-op Monitoring Plan Comments:   Post Op Pain Control Plan: multimodal analgesia, IV/PO Opioids PRN and per primary service following discharge from PACU  Post Op Pain Control Plan Comments:    Induction:   IV  Beta Blocker:  Patient is not currently on a Beta-Blocker (No further documentation required).       Informed Consent: Patient understands risks and agrees with Anesthesia plan.  Questions answered. Anesthesia consent signed with patient.  ASA Score: 2     Day of Surgery Review of History & Physical:  There are no significant changes.  H&P update referred to the surgeon.         Ready For Surgery From Anesthesia Perspective.

## 2018-09-26 ENCOUNTER — HOSPITAL ENCOUNTER (INPATIENT)
Facility: HOSPITAL | Age: 63
LOS: 3 days | Discharge: HOME | DRG: 614 | End: 2018-09-29
Attending: NEUROLOGICAL SURGERY | Admitting: NEUROLOGICAL SURGERY
Payer: COMMERCIAL

## 2018-09-26 ENCOUNTER — ANESTHESIA (OUTPATIENT)
Dept: SURGERY | Facility: HOSPITAL | Age: 63
DRG: 614 | End: 2018-09-26
Payer: COMMERCIAL

## 2018-09-26 DIAGNOSIS — D49.7 PITUITARY TUMOR: ICD-10-CM

## 2018-09-26 PROBLEM — E03.8 OTHER SPECIFIED HYPOTHYROIDISM: Status: ACTIVE | Noted: 2018-09-26

## 2018-09-26 LAB
ABO + RH BLD: NORMAL
ANION GAP SERPL CALC-SCNC: 9 MMOL/L
BASOPHILS # BLD AUTO: 0.04 K/UL
BASOPHILS NFR BLD: 0.7 %
BLD GP AB SCN CELLS X3 SERPL QL: NORMAL
BUN SERPL-MCNC: 18 MG/DL
CALCIUM SERPL-MCNC: 8.1 MG/DL
CHLORIDE SERPL-SCNC: 109 MMOL/L
CO2 SERPL-SCNC: 23 MMOL/L
CREAT SERPL-MCNC: 0.9 MG/DL
DIFFERENTIAL METHOD: ABNORMAL
EOSINOPHIL # BLD AUTO: 0.1 K/UL
EOSINOPHIL NFR BLD: 1 %
ERYTHROCYTE [DISTWIDTH] IN BLOOD BY AUTOMATED COUNT: 13.1 %
EST. GFR  (AFRICAN AMERICAN): >60 ML/MIN/1.73 M^2
EST. GFR  (NON AFRICAN AMERICAN): >60 ML/MIN/1.73 M^2
GLUCOSE SERPL-MCNC: 132 MG/DL (ref 70–110)
GLUCOSE SERPL-MCNC: 140 MG/DL
GLUCOSE SERPL-MCNC: 89 MG/DL (ref 70–110)
HCO3 UR-SCNC: 22.6 MMOL/L (ref 24–28)
HCO3 UR-SCNC: 23.1 MMOL/L (ref 24–28)
HCT VFR BLD AUTO: 32.2 %
HCT VFR BLD CALC: 27 %PCV (ref 36–54)
HCT VFR BLD CALC: 28 %PCV (ref 36–54)
HGB BLD-MCNC: 11 G/DL
IMM GRANULOCYTES # BLD AUTO: 0.04 K/UL
IMM GRANULOCYTES NFR BLD AUTO: 0.7 %
LYMPHOCYTES # BLD AUTO: 0.9 K/UL
LYMPHOCYTES NFR BLD: 16.1 %
MCH RBC QN AUTO: 31.4 PG
MCHC RBC AUTO-ENTMCNC: 34.2 G/DL
MCV RBC AUTO: 92 FL
MONOCYTES # BLD AUTO: 0.2 K/UL
MONOCYTES NFR BLD: 4 %
NEUTROPHILS # BLD AUTO: 4.4 K/UL
NEUTROPHILS NFR BLD: 77.5 %
NRBC BLD-RTO: 0 /100 WBC
PCO2 BLDA: 32.4 MMHG (ref 35–45)
PCO2 BLDA: 33.7 MMHG (ref 35–45)
PH SMN: 7.44 [PH] (ref 7.35–7.45)
PH SMN: 7.45 [PH] (ref 7.35–7.45)
PLATELET # BLD AUTO: 272 K/UL
PMV BLD AUTO: 10.4 FL
PO2 BLDA: 92 MMHG (ref 80–100)
PO2 BLDA: 98 MMHG (ref 80–100)
POC BE: -1 MMOL/L
POC BE: -1 MMOL/L
POC IONIZED CALCIUM: 1.12 MMOL/L (ref 1.06–1.42)
POC IONIZED CALCIUM: 1.15 MMOL/L (ref 1.06–1.42)
POC SATURATED O2: 98 % (ref 95–100)
POC SATURATED O2: 98 % (ref 95–100)
POC TCO2: 24 MMOL/L (ref 23–27)
POC TCO2: 24 MMOL/L (ref 23–27)
POCT GLUCOSE: 135 MG/DL (ref 70–110)
POCT GLUCOSE: 147 MG/DL (ref 70–110)
POCT GLUCOSE: 154 MG/DL (ref 70–110)
POCT GLUCOSE: 91 MG/DL (ref 70–110)
POTASSIUM BLD-SCNC: 3.6 MMOL/L (ref 3.5–5.1)
POTASSIUM BLD-SCNC: 3.6 MMOL/L (ref 3.5–5.1)
POTASSIUM SERPL-SCNC: 4 MMOL/L
RBC # BLD AUTO: 3.5 M/UL
SAMPLE: ABNORMAL
SAMPLE: ABNORMAL
SODIUM BLD-SCNC: 142 MMOL/L (ref 136–145)
SODIUM BLD-SCNC: 143 MMOL/L (ref 136–145)
SODIUM SERPL-SCNC: 141 MMOL/L
WBC # BLD AUTO: 5.73 K/UL

## 2018-09-26 PROCEDURE — C9113 INJ PANTOPRAZOLE SODIUM, VIA: HCPCS | Performed by: STUDENT IN AN ORGANIZED HEALTH CARE EDUCATION/TRAINING PROGRAM

## 2018-09-26 PROCEDURE — 61548 REMOVAL OF PITUITARY GLAND: CPT | Mod: 62,,, | Performed by: NEUROLOGICAL SURGERY

## 2018-09-26 PROCEDURE — 99222 1ST HOSP IP/OBS MODERATE 55: CPT | Mod: ,,, | Performed by: INTERNAL MEDICINE

## 2018-09-26 PROCEDURE — 63600175 PHARM REV CODE 636 W HCPCS: Performed by: STUDENT IN AN ORGANIZED HEALTH CARE EDUCATION/TRAINING PROGRAM

## 2018-09-26 PROCEDURE — 88305 TISSUE EXAM BY PATHOLOGIST: CPT | Performed by: PATHOLOGY

## 2018-09-26 PROCEDURE — 36620 INSERTION CATHETER ARTERY: CPT | Mod: 59,,, | Performed by: ANESTHESIOLOGY

## 2018-09-26 PROCEDURE — 94761 N-INVAS EAR/PLS OXIMETRY MLT: CPT

## 2018-09-26 PROCEDURE — 86920 COMPATIBILITY TEST SPIN: CPT

## 2018-09-26 PROCEDURE — 27201037 HC PRESSURE MONITORING SET UP

## 2018-09-26 PROCEDURE — 27201423 OPTIME MED/SURG SUP & DEVICES STERILE SUPPLY: Performed by: NEUROLOGICAL SURGERY

## 2018-09-26 PROCEDURE — 99222 1ST HOSP IP/OBS MODERATE 55: CPT | Mod: ,,, | Performed by: PHYSICIAN ASSISTANT

## 2018-09-26 PROCEDURE — 71000033 HC RECOVERY, INTIAL HOUR: Performed by: NEUROLOGICAL SURGERY

## 2018-09-26 PROCEDURE — 25000003 PHARM REV CODE 250: Performed by: STUDENT IN AN ORGANIZED HEALTH CARE EDUCATION/TRAINING PROGRAM

## 2018-09-26 PROCEDURE — 36000711: Performed by: NEUROLOGICAL SURGERY

## 2018-09-26 PROCEDURE — 37000009 HC ANESTHESIA EA ADD 15 MINS: Performed by: NEUROLOGICAL SURGERY

## 2018-09-26 PROCEDURE — 88305 TISSUE EXAM BY PATHOLOGIST: CPT | Mod: 26,,, | Performed by: PATHOLOGY

## 2018-09-26 PROCEDURE — 71000039 HC RECOVERY, EACH ADD'L HOUR: Performed by: NEUROLOGICAL SURGERY

## 2018-09-26 PROCEDURE — 82962 GLUCOSE BLOOD TEST: CPT | Performed by: NEUROLOGICAL SURGERY

## 2018-09-26 PROCEDURE — 37000008 HC ANESTHESIA 1ST 15 MINUTES: Performed by: NEUROLOGICAL SURGERY

## 2018-09-26 PROCEDURE — 63600175 PHARM REV CODE 636 W HCPCS: Performed by: NEUROLOGICAL SURGERY

## 2018-09-26 PROCEDURE — 80048 BASIC METABOLIC PNL TOTAL CA: CPT

## 2018-09-26 PROCEDURE — 0GB04ZZ EXCISION OF PITUITARY GLAND, PERCUTANEOUS ENDOSCOPIC APPROACH: ICD-10-PCS | Performed by: NEUROLOGICAL SURGERY

## 2018-09-26 PROCEDURE — 25000003 PHARM REV CODE 250: Performed by: NEUROLOGICAL SURGERY

## 2018-09-26 PROCEDURE — 36000710: Performed by: NEUROLOGICAL SURGERY

## 2018-09-26 PROCEDURE — 85025 COMPLETE CBC W/AUTO DIFF WBC: CPT

## 2018-09-26 PROCEDURE — 86901 BLOOD TYPING SEROLOGIC RH(D): CPT

## 2018-09-26 PROCEDURE — D9220A PRA ANESTHESIA: Mod: ,,, | Performed by: ANESTHESIOLOGY

## 2018-09-26 PROCEDURE — 20000000 HC ICU ROOM

## 2018-09-26 RX ORDER — HYDROMORPHONE HYDROCHLORIDE 1 MG/ML
0.2 INJECTION, SOLUTION INTRAMUSCULAR; INTRAVENOUS; SUBCUTANEOUS EVERY 5 MIN PRN
Status: DISCONTINUED | OUTPATIENT
Start: 2018-09-26 | End: 2018-09-26 | Stop reason: HOSPADM

## 2018-09-26 RX ORDER — IBUPROFEN 200 MG
16 TABLET ORAL
Status: DISCONTINUED | OUTPATIENT
Start: 2018-09-26 | End: 2018-09-29 | Stop reason: HOSPADM

## 2018-09-26 RX ORDER — GLYCOPYRROLATE 0.2 MG/ML
INJECTION INTRAMUSCULAR; INTRAVENOUS
Status: DISCONTINUED | OUTPATIENT
Start: 2018-09-26 | End: 2018-09-26

## 2018-09-26 RX ORDER — SODIUM CHLORIDE 9 MG/ML
INJECTION, SOLUTION INTRAVENOUS CONTINUOUS PRN
Status: DISCONTINUED | OUTPATIENT
Start: 2018-09-26 | End: 2018-09-26

## 2018-09-26 RX ORDER — ONDANSETRON 8 MG/1
8 TABLET, ORALLY DISINTEGRATING ORAL EVERY 8 HOURS PRN
Status: DISCONTINUED | OUTPATIENT
Start: 2018-09-26 | End: 2018-09-29 | Stop reason: HOSPADM

## 2018-09-26 RX ORDER — PHENYLEPHRINE HYDROCHLORIDE 10 MG/ML
INJECTION INTRAVENOUS
Status: DISCONTINUED | OUTPATIENT
Start: 2018-09-26 | End: 2018-09-26

## 2018-09-26 RX ORDER — FENTANYL CITRATE 50 UG/ML
INJECTION, SOLUTION INTRAMUSCULAR; INTRAVENOUS
Status: DISCONTINUED | OUTPATIENT
Start: 2018-09-26 | End: 2018-09-26

## 2018-09-26 RX ORDER — BACITRACIN 50000 [IU]/1
INJECTION, POWDER, FOR SOLUTION INTRAMUSCULAR
Status: DISCONTINUED | OUTPATIENT
Start: 2018-09-26 | End: 2018-09-26

## 2018-09-26 RX ORDER — ACETAMINOPHEN 650 MG/1
650 SUPPOSITORY RECTAL EVERY 6 HOURS PRN
Status: DISCONTINUED | OUTPATIENT
Start: 2018-09-26 | End: 2018-09-29 | Stop reason: HOSPADM

## 2018-09-26 RX ORDER — INSULIN ASPART 100 [IU]/ML
0-4 INJECTION, SOLUTION INTRAVENOUS; SUBCUTANEOUS
Status: DISCONTINUED | OUTPATIENT
Start: 2018-09-26 | End: 2018-09-29 | Stop reason: HOSPADM

## 2018-09-26 RX ORDER — HYDROCORTISONE 5 MG/1
10 TABLET ORAL NIGHTLY
Status: DISCONTINUED | OUTPATIENT
Start: 2018-09-26 | End: 2018-09-29 | Stop reason: HOSPADM

## 2018-09-26 RX ORDER — NEOSTIGMINE METHYLSULFATE 1 MG/ML
INJECTION, SOLUTION INTRAVENOUS
Status: DISCONTINUED | OUTPATIENT
Start: 2018-09-26 | End: 2018-09-26

## 2018-09-26 RX ORDER — ONDANSETRON 2 MG/ML
INJECTION INTRAMUSCULAR; INTRAVENOUS
Status: DISCONTINUED | OUTPATIENT
Start: 2018-09-26 | End: 2018-09-26

## 2018-09-26 RX ORDER — LIDOCAINE HYDROCHLORIDE AND EPINEPHRINE 10; 10 MG/ML; UG/ML
INJECTION, SOLUTION INFILTRATION; PERINEURAL
Status: DISCONTINUED | OUTPATIENT
Start: 2018-09-26 | End: 2018-09-26

## 2018-09-26 RX ORDER — IBUPROFEN 200 MG
24 TABLET ORAL
Status: DISCONTINUED | OUTPATIENT
Start: 2018-09-26 | End: 2018-09-29 | Stop reason: HOSPADM

## 2018-09-26 RX ORDER — DIPHENHYDRAMINE HYDROCHLORIDE 50 MG/ML
25 INJECTION INTRAMUSCULAR; INTRAVENOUS EVERY 6 HOURS PRN
Status: DISCONTINUED | OUTPATIENT
Start: 2018-09-26 | End: 2018-09-26 | Stop reason: HOSPADM

## 2018-09-26 RX ORDER — LABETALOL HYDROCHLORIDE 5 MG/ML
INJECTION, SOLUTION INTRAVENOUS
Status: DISCONTINUED | OUTPATIENT
Start: 2018-09-26 | End: 2018-09-26

## 2018-09-26 RX ORDER — SODIUM CHLORIDE 9 MG/ML
INJECTION, SOLUTION INTRAVENOUS CONTINUOUS
Status: DISCONTINUED | OUTPATIENT
Start: 2018-09-26 | End: 2018-09-28

## 2018-09-26 RX ORDER — LIDOCAINE HCL/PF 100 MG/5ML
SYRINGE (ML) INTRAVENOUS
Status: DISCONTINUED | OUTPATIENT
Start: 2018-09-26 | End: 2018-09-26

## 2018-09-26 RX ORDER — LIDOCAINE HYDROCHLORIDE 10 MG/ML
1 INJECTION, SOLUTION EPIDURAL; INFILTRATION; INTRACAUDAL; PERINEURAL ONCE
Status: DISCONTINUED | OUTPATIENT
Start: 2018-09-26 | End: 2018-09-26

## 2018-09-26 RX ORDER — PANTOPRAZOLE SODIUM 40 MG/10ML
40 INJECTION, POWDER, LYOPHILIZED, FOR SOLUTION INTRAVENOUS DAILY
Status: DISCONTINUED | OUTPATIENT
Start: 2018-09-26 | End: 2018-09-27

## 2018-09-26 RX ORDER — HYDROCODONE BITARTRATE AND ACETAMINOPHEN 5; 325 MG/1; MG/1
1 TABLET ORAL EVERY 4 HOURS PRN
Status: DISCONTINUED | OUTPATIENT
Start: 2018-09-26 | End: 2018-09-29 | Stop reason: HOSPADM

## 2018-09-26 RX ORDER — LEVOTHYROXINE SODIUM 50 UG/1
50 TABLET ORAL
Status: DISCONTINUED | OUTPATIENT
Start: 2018-09-27 | End: 2018-09-29 | Stop reason: HOSPADM

## 2018-09-26 RX ORDER — HYDROMORPHONE HYDROCHLORIDE 1 MG/ML
0.5 INJECTION, SOLUTION INTRAMUSCULAR; INTRAVENOUS; SUBCUTANEOUS
Status: DISCONTINUED | OUTPATIENT
Start: 2018-09-26 | End: 2018-09-29 | Stop reason: HOSPADM

## 2018-09-26 RX ORDER — ACETAMINOPHEN 10 MG/ML
INJECTION, SOLUTION INTRAVENOUS
Status: DISCONTINUED | OUTPATIENT
Start: 2018-09-26 | End: 2018-09-26

## 2018-09-26 RX ORDER — HYDROCORTISONE 5 MG/1
20 TABLET ORAL DAILY
Status: DISCONTINUED | OUTPATIENT
Start: 2018-09-27 | End: 2018-09-29 | Stop reason: HOSPADM

## 2018-09-26 RX ORDER — DEXAMETHASONE SODIUM PHOSPHATE 4 MG/ML
INJECTION, SOLUTION INTRA-ARTICULAR; INTRALESIONAL; INTRAMUSCULAR; INTRAVENOUS; SOFT TISSUE
Status: DISCONTINUED | OUTPATIENT
Start: 2018-09-26 | End: 2018-09-26

## 2018-09-26 RX ORDER — MUPIROCIN 20 MG/G
1 OINTMENT TOPICAL 2 TIMES DAILY
Status: DISCONTINUED | OUTPATIENT
Start: 2018-09-26 | End: 2018-09-29 | Stop reason: HOSPADM

## 2018-09-26 RX ORDER — GLUCAGON 1 MG
1 KIT INJECTION
Status: DISCONTINUED | OUTPATIENT
Start: 2018-09-26 | End: 2018-09-29 | Stop reason: HOSPADM

## 2018-09-26 RX ORDER — ACETAMINOPHEN 325 MG/1
650 TABLET ORAL EVERY 6 HOURS PRN
Status: DISCONTINUED | OUTPATIENT
Start: 2018-09-26 | End: 2018-09-29 | Stop reason: HOSPADM

## 2018-09-26 RX ORDER — ROCURONIUM BROMIDE 10 MG/ML
INJECTION, SOLUTION INTRAVENOUS
Status: DISCONTINUED | OUTPATIENT
Start: 2018-09-26 | End: 2018-09-26

## 2018-09-26 RX ORDER — LISINOPRIL 2.5 MG/1
10 TABLET ORAL DAILY
Status: DISCONTINUED | OUTPATIENT
Start: 2018-09-27 | End: 2018-09-29 | Stop reason: HOSPADM

## 2018-09-26 RX ORDER — SODIUM CHLORIDE 0.9 % (FLUSH) 0.9 %
3 SYRINGE (ML) INJECTION
Status: DISCONTINUED | OUTPATIENT
Start: 2018-09-26 | End: 2018-09-29 | Stop reason: HOSPADM

## 2018-09-26 RX ORDER — PROPOFOL 10 MG/ML
VIAL (ML) INTRAVENOUS
Status: DISCONTINUED | OUTPATIENT
Start: 2018-09-26 | End: 2018-09-26

## 2018-09-26 RX ORDER — PANTOPRAZOLE SODIUM 40 MG/10ML
40 INJECTION, POWDER, LYOPHILIZED, FOR SOLUTION INTRAVENOUS DAILY
Status: DISCONTINUED | OUTPATIENT
Start: 2018-09-26 | End: 2018-09-26

## 2018-09-26 RX ORDER — MIDAZOLAM HYDROCHLORIDE 5 MG/ML
INJECTION INTRAMUSCULAR; INTRAVENOUS
Status: DISCONTINUED | OUTPATIENT
Start: 2018-09-26 | End: 2018-09-26

## 2018-09-26 RX ADMIN — CEFTRIAXONE 2 G: 1 INJECTION, SOLUTION INTRAVENOUS at 09:09

## 2018-09-26 RX ADMIN — SODIUM CHLORIDE: 0.9 INJECTION, SOLUTION INTRAVENOUS at 07:09

## 2018-09-26 RX ADMIN — PANTOPRAZOLE SODIUM 40 MG: 40 INJECTION, POWDER, FOR SOLUTION INTRAVENOUS at 09:09

## 2018-09-26 RX ADMIN — GLYCOPYRROLATE 0.2 MG: 0.2 INJECTION, SOLUTION INTRAMUSCULAR; INTRAVENOUS at 08:09

## 2018-09-26 RX ADMIN — PHENYLEPHRINE HYDROCHLORIDE 100 MCG: 10 INJECTION INTRAVENOUS at 09:09

## 2018-09-26 RX ADMIN — PROPOFOL 50 MG: 10 INJECTION, EMULSION INTRAVENOUS at 08:09

## 2018-09-26 RX ADMIN — FENTANYL CITRATE 50 MCG: 50 INJECTION, SOLUTION INTRAMUSCULAR; INTRAVENOUS at 09:09

## 2018-09-26 RX ADMIN — HYDROCORTISONE 10 MG: 10 TABLET ORAL at 08:09

## 2018-09-26 RX ADMIN — LABETALOL HYDROCHLORIDE 5 MG: 5 INJECTION, SOLUTION INTRAVENOUS at 09:09

## 2018-09-26 RX ADMIN — SODIUM CHLORIDE, SODIUM GLUCONATE, SODIUM ACETATE, POTASSIUM CHLORIDE, MAGNESIUM CHLORIDE, SODIUM PHOSPHATE, DIBASIC, AND POTASSIUM PHOSPHATE: .53; .5; .37; .037; .03; .012; .00082 INJECTION, SOLUTION INTRAVENOUS at 08:09

## 2018-09-26 RX ADMIN — PHENYLEPHRINE HYDROCHLORIDE 100 MCG: 10 INJECTION INTRAVENOUS at 08:09

## 2018-09-26 RX ADMIN — SODIUM CHLORIDE: 0.9 INJECTION, SOLUTION INTRAVENOUS at 11:09

## 2018-09-26 RX ADMIN — HYDROCODONE BITARTRATE AND ACETAMINOPHEN 1 TABLET: 5; 325 TABLET ORAL at 09:09

## 2018-09-26 RX ADMIN — FENTANYL CITRATE 25 MCG: 50 INJECTION, SOLUTION INTRAMUSCULAR; INTRAVENOUS at 10:09

## 2018-09-26 RX ADMIN — NEOSTIGMINE METHYLSULFATE 5 MG: 1 INJECTION INTRAVENOUS at 11:09

## 2018-09-26 RX ADMIN — MIDAZOLAM 2 MG: 5 INJECTION INTRAMUSCULAR; INTRAVENOUS at 08:09

## 2018-09-26 RX ADMIN — MUPIROCIN 1 G: 20 OINTMENT TOPICAL at 12:09

## 2018-09-26 RX ADMIN — ROCURONIUM BROMIDE 10 MG: 10 INJECTION, SOLUTION INTRAVENOUS at 09:09

## 2018-09-26 RX ADMIN — FENTANYL CITRATE 25 MCG: 50 INJECTION, SOLUTION INTRAMUSCULAR; INTRAVENOUS at 08:09

## 2018-09-26 RX ADMIN — DEXAMETHASONE SODIUM PHOSPHATE 8 MG: 4 INJECTION, SOLUTION INTRAMUSCULAR; INTRAVENOUS at 09:09

## 2018-09-26 RX ADMIN — Medication 0.2 MG: at 05:09

## 2018-09-26 RX ADMIN — ROCURONIUM BROMIDE 40 MG: 10 INJECTION, SOLUTION INTRAVENOUS at 08:09

## 2018-09-26 RX ADMIN — ACETAMINOPHEN 1000 MG: 10 INJECTION, SOLUTION INTRAVENOUS at 09:09

## 2018-09-26 RX ADMIN — ONDANSETRON 4 MG: 2 INJECTION INTRAMUSCULAR; INTRAVENOUS at 11:09

## 2018-09-26 RX ADMIN — PROPOFOL 150 MG: 10 INJECTION, EMULSION INTRAVENOUS at 08:09

## 2018-09-26 RX ADMIN — LIDOCAINE HYDROCHLORIDE 60 MG: 20 INJECTION, SOLUTION INTRAVENOUS at 08:09

## 2018-09-26 RX ADMIN — MUPIROCIN 1 G: 20 OINTMENT TOPICAL at 08:09

## 2018-09-26 RX ADMIN — Medication 0.2 MG: at 01:09

## 2018-09-26 RX ADMIN — Medication 0.2 MG: at 12:09

## 2018-09-26 RX ADMIN — GLYCOPYRROLATE 0.6 MG: 0.2 INJECTION, SOLUTION INTRAMUSCULAR; INTRAVENOUS at 11:09

## 2018-09-26 RX ADMIN — PANTOPRAZOLE SODIUM 40 MG: 40 INJECTION, POWDER, FOR SOLUTION INTRAVENOUS at 12:09

## 2018-09-26 NOTE — ANESTHESIA PROCEDURE NOTES
Arterial    Diagnosis: pituitary adenoma    Patient location during procedure: done in OR  Procedure start time: 9/26/2018 8:28 AM  Timeout: 9/26/2018 8:28 AM  Procedure end time: 9/26/2018 8:33 AM  Staffing  Anesthesiologist: Matilde Ponce MD  Resident/CRNA: Lamin Kaufman MD  Performed: resident/CRNA   Anesthesiologist was present at the time of the procedure.  Preanesthetic Checklist  Completed: patient identified, site marked, surgical consent, pre-op evaluation, timeout performed, IV checked, risks and benefits discussed, monitors and equipment checked and anesthesia consent givenArterial  Skin Prep: alcohol swabs  Local Infiltration: none  Orientation: right  Location: radial  Catheter Size: 20 G  Catheter placement by Anatomical landmarks. Heme positive aspiration all ports.Insertion Attempts: 1  Assessment  Dressing: secured with tape and tegaderm

## 2018-09-26 NOTE — H&P
[facility]  Brief Operative Note    SUMMARY     Surgery Date: 9/26/2018     Surgeon(s) and Role:     * Racquel Mcadams MD - Primary     * Rambo Campos MD - Assisting     * Micheal Rowley MD - Resident Assisting        Pre-op Diagnosis:  Pituitary macroadenoma [D35.2]    Post-op Diagnosis:  Post-Op Diagnosis Codes:     * Pituitary macroadenoma [D35.2]    Procedure(s) (LRB):  RESECTION, NEOPLASM, PITUITARY, TRANSSPHENOIDAL APPROACH (N/A)    Anesthesia: General    Description of Procedure: Transphenoidal for pituitary tumor resection    Description of the findings of the procedure: As above    Estimated Blood Loss: * No values recorded between 9/26/2018  9:27 AM and 9/26/2018 11:25 AM *         Specimens:   Specimen (12h ago, onward)    Start     Ordered    09/26/18 1135  Specimen to Pathology - Surgery  Once     Comments:  1.) Permanent Sellar Tumor - PERMANENT     Start Status   09/26/18 1135 In process       09/26/18 1135

## 2018-09-26 NOTE — ASSESSMENT & PLAN NOTE
Type 2 DM  On Metformin 500 mg daily at home    Goal BG while inpatient: 140-180 mg/dl    Recommendations:   -Hold metformin   -POC AC/HS  -Low dose correction

## 2018-09-26 NOTE — CONSULTS
[facility]  Endocrinology  Consult Note    Consult Requested by: Racquel Mcadams MD   Reason for admit: <principal problem not specified>    HISTORY OF PRESENT ILLNESS:  A 63 yo woman with a diagnosis of pituitary macroadenoma underwent transphenoidal resection of the pituitary adenoma on 9/26. Endocrinology was consulted for assistance with post-op monitoring.   She was recently seen in endocrine clinic by Dr. Alves on 9/24.   She also has medical history of type 2 DM, HTN, CKD, HLD.    Pt was noted to have visual field defects when she went for her annual eye exam in Aug, 2018. Further workup revealed a large sellar mass with suprasellar extension.     Labs:   Prolactin: 53.4 (elevation was thought to be due to stalk effect)    As per the notes, pt had mildly low FT4. She was started on Levothyroxine 50 mcg, daily.      Medications and/or Treatments Impacting Glycemic Control:  Immunotherapy:    Immunosuppressants     None        Steroids:   Hormones (From admission, onward)    Start     Stop Route Frequency Ordered    09/27/18 0900  hydrocortisone tablet 20 mg      -- Oral Daily 09/26/18 1125    09/26/18 2100  hydrocortisone tablet 10 mg      -- Oral Nightly 09/26/18 1125        Pressors:    Autonomic Drugs (From admission, onward)    None          Medications Prior to Admission   Medication Sig Dispense Refill Last Dose    levothyroxine (SYNTHROID) 50 MCG tablet Take 1 tablet (50 mcg total) by mouth once daily. 30 tablet 11 9/26/2018 at 0515    lisinopril 10 MG tablet Take 10 mg by mouth once daily.   9/25/2018 at 0800    metFORMIN (GLUCOPHAGE-XR) 500 MG 24 hr tablet Take 500 mg by mouth.   9/25/2018 at 0800    multivitamin capsule Take 1 capsule by mouth.   9/25/2018 at 0800    simvastatin (ZOCOR) 10 MG tablet Take 10 mg by mouth every evening.   9/25/2018 at 2100       Current Facility-Administered Medications   Medication Dose Route Frequency Provider Last Rate Last Dose    0.9%  NaCl  infusion   Intravenous Continuous Micheal Rowley  mL/hr at 09/26/18 1146      acetaminophen suppository 650 mg  650 mg Rectal Q6H PRN Micheal Rowley MD        acetaminophen tablet 650 mg  650 mg Oral Q6H PRN Micheal Rowley MD        diphenhydrAMINE injection 25 mg  25 mg Intravenous Q6H PRN Lamin Kaufman MD        HYDROcodone-acetaminophen 5-325 mg per tablet 1 tablet  1 tablet Oral Q4H PRN Micheal Rowley MD        hydrocortisone tablet 10 mg  10 mg Oral QHS Micheal Rowley MD        [START ON 9/27/2018] hydrocortisone tablet 20 mg  20 mg Oral Daily Micheal Rowley MD        HYDROmorphone injection 0.2 mg  0.2 mg Intravenous Q5 Min PRN Lamin Kaufman MD   0.2 mg at 09/26/18 1324    HYDROmorphone injection 0.5 mg  0.5 mg Intravenous Q3H PRN Micheal Rowley MD        mupirocin 2 % ointment 1 g  1 g Nasal BID Micheal Rowley MD   1 g at 09/26/18 1224    ondansetron disintegrating tablet 8 mg  8 mg Oral Q8H PRN Micheal Rowley MD        pantoprazole injection 40 mg  40 mg Intravenous Daily Micheal Rowley MD   40 mg at 09/26/18 1224    promethazine (PHENERGAN) 6.25 mg in dextrose 5 % 50 mL IVPB  6.25 mg Intravenous Q10 Min PRN Lamin Kaufman MD        sodium chloride 0.9% flush 3 mL  3 mL Intravenous PRN Lamin Kaufman MD           Interval HPI:   Overnight events:  VS stable    Eating:   On liquid diet  Nausea: No  Hypoglycemia and intervention: No  Fever: No  TPN and/or TF: No    PMH, PSH, FH, SH updated and reviewed     ROS:    Review of Systems   Constitutional: Negative for unexpected weight change.   Eyes: Negative for visual disturbance.   Respiratory: Negative for shortness of breath.    Cardiovascular: Negative for chest pain.   Gastrointestinal: Negative for abdominal pain.   Genitourinary: Negative for urgency.   Musculoskeletal: Negative for arthralgias.   Skin: Negative for wound.   Neurological: Negative for headaches.   Hematological: Does not  bruise/bleed easily.   Psychiatric/Behavioral: Negative for sleep disturbance.       PHYSICAL EXAMINATION:  Vitals:    09/26/18 1600   BP: 136/83   Pulse: 62   Resp: 10   Temp:      Body mass index is 36.11 kg/m².    Physical Exam   Constitutional: She is oriented to person, place, and time. She appears well-developed and well-nourished.   HENT:   Head: Normocephalic.   S/p transphenoidal surgery   Neck: Neck supple. No thyromegaly present.   Cardiovascular: Normal rate, regular rhythm and normal heart sounds.   Pulmonary/Chest: Effort normal. No respiratory distress.   Abdominal: Soft. There is no tenderness.   Neurological: She is alert and oriented to person, place, and time.   Skin: Skin is warm. No rash noted.   Psychiatric: She has a normal mood and affect. Her behavior is normal.     .     ASSESSMENT and PLAN:    Pituitary tumor    S/p resection    Pt has no h/o adrenal insufficiency or excess.   Currently on Hydrocortisone 20 mg daily and 10 mg, hs.    Pt has h/o mild hypothyroidism.  On PO Levothyroxine 50 mcg, daily at home.    Pt has no h/o diabetes insipidus.    Recommendations:   -Continue current dose of hydrocortisone. If pt becomes unstable with hypotension, would start stress dose steroids (Hydrocortisone 100 mg Q8h).  -Continue PO Levothyroxine 50 mcg, qAM.  -Monitor for diabetes insipidus: would give desmopressin only if urine output>250cc for x2 consecutive hours with Na >145 and low spec gravity   -Monitor STRICT I/Os                Type 2 diabetes mellitus without retinopathy    Type 2 DM  On Metformin 500 mg daily at home    Goal BG while inpatient: 140-180 mg/dl    Recommendations:   -Hold metformin   -POC AC/HS  -Low dose correction                DISCHARGE NEEDS: will assess daily    Jaimie Lucio MD  Endocrinology  [facility]

## 2018-09-26 NOTE — ASSESSMENT & PLAN NOTE
S/p resection    Pt has no h/o adrenal insufficiency or excess.   Currently on Hydrocortisone 20 mg daily and 10 mg, hs.    Pt has h/o mild hypothyroidism.  On PO Levothyroxine 50 mcg, daily at home.    Pt has no h/o diabetes insipidus.    Recommendations:   -Continue current dose of hydrocortisone. If pt becomes unstable with hypotension, would start stress dose steroids (Hydrocortisone 100 mg Q8h).  -Continue PO Levothyroxine 50 mcg, qAM.  -Monitor for diabetes insipidus: would give desmopressin only if urine output>250cc for x2 consecutive hours with Na >145 and low spec gravity   -Monitor STRICT I/Os

## 2018-09-26 NOTE — HPI
A 63 yo woman with a diagnosis of pituitary macroadenoma underwent transphenoidal resection of the pituitary adenoma on 9/26. Endocrinology was consulted for assistance with post-op monitoring.   She was recently seen in endocrine clinic by Dr. Alves on 9/24.   She also has medical history of type 2 DM, HTN, CKD, HLD.    Pt was noted to have visual field defects when she went for her annual eye exam in Aug, 2018. Further workup revealed a large sellar mass with suprasellar extension.     Labs:   Prolactin: 53.4 (elevation was thought to be due to stalk effect)    As per the notes, pt had mildly low FT4. She was started on Levothyroxine 50 mcg, daily.

## 2018-09-26 NOTE — ANESTHESIA RELEASE NOTE
"Anesthesia Release from PACU Note    Patient: Juliana Forrester    Procedure(s) Performed: Procedure(s) (LRB):  RESECTION, NEOPLASM, PITUITARY, TRANSSPHENOIDAL APPROACH (N/A)    Anesthesia type: general    Post pain: Adequate analgesia    Post assessment: no apparent anesthetic complications    Last Vitals:   Visit Vitals  /85   Pulse 65   Temp 36.4 °C (97.5 °F) (Axillary)   Resp 20   Ht 5' 5" (1.651 m)   Wt 98.4 kg (217 lb)   SpO2 100%   Breastfeeding? No   BMI 36.11 kg/m²       Post vital signs: stable    Level of consciousness: awake, alert  and oriented    Nausea/Vomiting: no nausea/no vomiting    Complications: none    Airway Patency: patent    Respiratory: unassisted, spontaneous ventilation, room air    Cardiovascular: stable    Hydration: euvolemic  "

## 2018-09-26 NOTE — TRANSFER OF CARE
"Anesthesia Transfer of Care Note    Patient: Juliana Forrester    Procedure(s) Performed: Procedure(s) (LRB):  RESECTION, NEOPLASM, PITUITARY, TRANSSPHENOIDAL APPROACH (N/A)    Patient location: PACU    Anesthesia Type: general    Transport from OR: Transported from OR on room air with adequate spontaneous ventilation    Post pain: adequate analgesia    Post assessment: no apparent anesthetic complications    Post vital signs: stable    Level of consciousness: awake, alert and oriented    Nausea/Vomiting: no nausea/vomiting    Complications: none    Transfer of care protocol was followed      Last vitals:   Visit Vitals  /86 (BP Location: Left arm, Patient Position: Lying)   Pulse 81   Temp 36.4 °C (97.5 °F) (Oral)   Resp 17   Ht 5' 5" (1.651 m)   Wt 98.4 kg (217 lb)   SpO2 100%   Breastfeeding? No   BMI 36.11 kg/m²     "

## 2018-09-26 NOTE — ANESTHESIA POSTPROCEDURE EVALUATION
"Anesthesia Post Evaluation    Patient: Juliana Forrester    Procedure(s) Performed: Procedure(s) (LRB):  RESECTION, NEOPLASM, PITUITARY, TRANSSPHENOIDAL APPROACH (N/A)    Final Anesthesia Type: general  Patient location during evaluation: PACU  Patient participation: Yes- Able to Participate  Level of consciousness: awake and alert, awake and oriented  Post-procedure vital signs: reviewed and stable  Pain management: adequate  Airway patency: patent  PONV status at discharge: No PONV  Anesthetic complications: no      Cardiovascular status: hemodynamically stable  Respiratory status: unassisted, spontaneous ventilation and room air  Hydration status: euvolemic  Follow-up not needed.        Visit Vitals  /85   Pulse 65   Temp 36.4 °C (97.5 °F) (Axillary)   Resp 20   Ht 5' 5" (1.651 m)   Wt 98.4 kg (217 lb)   SpO2 100%   Breastfeeding? No   BMI 36.11 kg/m²       Pain/Christelle Score: Pain Assessment Performed: Yes (9/26/2018  6:37 AM)  Presence of Pain: denies (9/26/2018  6:37 AM)  Pain Rating Prior to Med Admin: 6 (9/26/2018 12:06 PM)        "

## 2018-09-26 NOTE — SUBJECTIVE & OBJECTIVE
Interval HPI:   Overnight events:  VS stable    Eating:   On liquid diet  Nausea: No  Hypoglycemia and intervention: No  Fever: No  TPN and/or TF: No    PMH, PSH, FH, SH updated and reviewed     ROS:    Review of Systems   Constitutional: Negative for unexpected weight change.   Eyes: Negative for visual disturbance.   Respiratory: Negative for shortness of breath.    Cardiovascular: Negative for chest pain.   Gastrointestinal: Negative for abdominal pain.   Genitourinary: Negative for urgency.   Musculoskeletal: Negative for arthralgias.   Skin: Negative for wound.   Neurological: Negative for headaches.   Hematological: Does not bruise/bleed easily.   Psychiatric/Behavioral: Negative for sleep disturbance.       PHYSICAL EXAMINATION:  Vitals:    09/26/18 1600   BP: 136/83   Pulse: 62   Resp: 10   Temp:      Body mass index is 36.11 kg/m².    Physical Exam   Constitutional: She is oriented to person, place, and time. She appears well-developed and well-nourished.   HENT:   Head: Normocephalic.   S/p transphenoidal surgery   Neck: Neck supple. No thyromegaly present.   Cardiovascular: Normal rate, regular rhythm and normal heart sounds.   Pulmonary/Chest: Effort normal. No respiratory distress.   Abdominal: Soft. There is no tenderness.   Neurological: She is alert and oriented to person, place, and time.   Skin: Skin is warm. No rash noted.   Psychiatric: She has a normal mood and affect. Her behavior is normal.

## 2018-09-27 LAB
ALBUMIN SERPL BCP-MCNC: 3 G/DL
ALP SERPL-CCNC: 74 U/L
ALT SERPL W/O P-5'-P-CCNC: 27 U/L
ANION GAP SERPL CALC-SCNC: 9 MMOL/L
ANION GAP SERPL CALC-SCNC: 9 MMOL/L
AST SERPL-CCNC: 19 U/L
BASOPHILS # BLD AUTO: 0.02 K/UL
BASOPHILS NFR BLD: 0.1 %
BILIRUB SERPL-MCNC: 0.4 MG/DL
BILIRUB UR QL STRIP: NEGATIVE
BUN SERPL-MCNC: 12 MG/DL
BUN SERPL-MCNC: 12 MG/DL
CALCIUM SERPL-MCNC: 7.9 MG/DL
CALCIUM SERPL-MCNC: 8.1 MG/DL
CHLORIDE SERPL-SCNC: 102 MMOL/L
CHLORIDE SERPL-SCNC: 106 MMOL/L
CLARITY UR REFRACT.AUTO: CLEAR
CO2 SERPL-SCNC: 17 MMOL/L
CO2 SERPL-SCNC: 19 MMOL/L
COLOR UR AUTO: COLORLESS
CREAT SERPL-MCNC: 0.8 MG/DL
CREAT SERPL-MCNC: 0.8 MG/DL
DIFFERENTIAL METHOD: ABNORMAL
EOSINOPHIL # BLD AUTO: 0 K/UL
EOSINOPHIL NFR BLD: 0 %
ERYTHROCYTE [DISTWIDTH] IN BLOOD BY AUTOMATED COUNT: 13.2 %
EST. GFR  (AFRICAN AMERICAN): >60 ML/MIN/1.73 M^2
EST. GFR  (AFRICAN AMERICAN): >60 ML/MIN/1.73 M^2
EST. GFR  (NON AFRICAN AMERICAN): >60 ML/MIN/1.73 M^2
EST. GFR  (NON AFRICAN AMERICAN): >60 ML/MIN/1.73 M^2
ESTIMATED AVG GLUCOSE: 108 MG/DL
GLUCOSE SERPL-MCNC: 151 MG/DL
GLUCOSE SERPL-MCNC: 152 MG/DL
GLUCOSE UR QL STRIP: NEGATIVE
HBA1C MFR BLD HPLC: 5.4 %
HCT VFR BLD AUTO: 31.6 %
HGB BLD-MCNC: 10.6 G/DL
HGB UR QL STRIP: NEGATIVE
IMM GRANULOCYTES # BLD AUTO: 0.11 K/UL
IMM GRANULOCYTES NFR BLD AUTO: 0.7 %
KETONES UR QL STRIP: NEGATIVE
LEUKOCYTE ESTERASE UR QL STRIP: NEGATIVE
LYMPHOCYTES # BLD AUTO: 0.9 K/UL
LYMPHOCYTES NFR BLD: 5.7 %
MAGNESIUM SERPL-MCNC: 1.9 MG/DL
MCH RBC QN AUTO: 30.7 PG
MCHC RBC AUTO-ENTMCNC: 33.5 G/DL
MCV RBC AUTO: 92 FL
MONOCYTES # BLD AUTO: 0.8 K/UL
MONOCYTES NFR BLD: 5.4 %
NEUTROPHILS # BLD AUTO: 13.7 K/UL
NEUTROPHILS NFR BLD: 88.1 %
NITRITE UR QL STRIP: NEGATIVE
NRBC BLD-RTO: 0 /100 WBC
OSMOLALITY SERPL: 285 MOSM/KG
PH UR STRIP: 7 [PH] (ref 5–8)
PHOSPHATE SERPL-MCNC: 2.8 MG/DL
PLATELET # BLD AUTO: 313 K/UL
PMV BLD AUTO: 10.6 FL
POCT GLUCOSE: 148 MG/DL (ref 70–110)
POCT GLUCOSE: 164 MG/DL (ref 70–110)
POCT GLUCOSE: 172 MG/DL (ref 70–110)
POTASSIUM SERPL-SCNC: 3.1 MMOL/L
POTASSIUM SERPL-SCNC: 3.1 MMOL/L
POTASSIUM SERPL-SCNC: 3.8 MMOL/L
PROT SERPL-MCNC: 5.4 G/DL
PROT UR QL STRIP: NEGATIVE
RBC # BLD AUTO: 3.45 M/UL
SODIUM SERPL-SCNC: 128 MMOL/L
SODIUM SERPL-SCNC: 134 MMOL/L
SODIUM SERPL-SCNC: 139 MMOL/L
SODIUM UR-SCNC: 24 MMOL/L
SP GR UR STRIP: 1 (ref 1–1.03)
URN SPEC COLLECT METH UR: ABNORMAL
UROBILINOGEN UR STRIP-ACNC: NEGATIVE EU/DL
WBC # BLD AUTO: 15.55 K/UL

## 2018-09-27 PROCEDURE — 80053 COMPREHEN METABOLIC PANEL: CPT

## 2018-09-27 PROCEDURE — 63600175 PHARM REV CODE 636 W HCPCS: Performed by: STUDENT IN AN ORGANIZED HEALTH CARE EDUCATION/TRAINING PROGRAM

## 2018-09-27 PROCEDURE — 83930 ASSAY OF BLOOD OSMOLALITY: CPT

## 2018-09-27 PROCEDURE — 81003 URINALYSIS AUTO W/O SCOPE: CPT

## 2018-09-27 PROCEDURE — 83735 ASSAY OF MAGNESIUM: CPT

## 2018-09-27 PROCEDURE — 25000003 PHARM REV CODE 250: Performed by: STUDENT IN AN ORGANIZED HEALTH CARE EDUCATION/TRAINING PROGRAM

## 2018-09-27 PROCEDURE — 83935 ASSAY OF URINE OSMOLALITY: CPT

## 2018-09-27 PROCEDURE — 80048 BASIC METABOLIC PNL TOTAL CA: CPT

## 2018-09-27 PROCEDURE — 84132 ASSAY OF SERUM POTASSIUM: CPT

## 2018-09-27 PROCEDURE — 85025 COMPLETE CBC W/AUTO DIFF WBC: CPT

## 2018-09-27 PROCEDURE — 20000000 HC ICU ROOM

## 2018-09-27 PROCEDURE — A4216 STERILE WATER/SALINE, 10 ML: HCPCS | Performed by: STUDENT IN AN ORGANIZED HEALTH CARE EDUCATION/TRAINING PROGRAM

## 2018-09-27 PROCEDURE — 83036 HEMOGLOBIN GLYCOSYLATED A1C: CPT

## 2018-09-27 PROCEDURE — 99232 SBSQ HOSP IP/OBS MODERATE 35: CPT | Mod: ,,, | Performed by: INTERNAL MEDICINE

## 2018-09-27 PROCEDURE — 99233 SBSQ HOSP IP/OBS HIGH 50: CPT | Mod: ,,, | Performed by: NURSE PRACTITIONER

## 2018-09-27 PROCEDURE — 25000003 PHARM REV CODE 250: Performed by: NURSE PRACTITIONER

## 2018-09-27 PROCEDURE — C9113 INJ PANTOPRAZOLE SODIUM, VIA: HCPCS | Performed by: STUDENT IN AN ORGANIZED HEALTH CARE EDUCATION/TRAINING PROGRAM

## 2018-09-27 PROCEDURE — 82530 CORTISOL FREE: CPT

## 2018-09-27 PROCEDURE — 84100 ASSAY OF PHOSPHORUS: CPT

## 2018-09-27 PROCEDURE — 25000003 PHARM REV CODE 250: Performed by: PHYSICIAN ASSISTANT

## 2018-09-27 PROCEDURE — 84300 ASSAY OF URINE SODIUM: CPT

## 2018-09-27 PROCEDURE — 84295 ASSAY OF SERUM SODIUM: CPT

## 2018-09-27 RX ORDER — FAMOTIDINE 20 MG/1
20 TABLET, FILM COATED ORAL 2 TIMES DAILY
Status: DISCONTINUED | OUTPATIENT
Start: 2018-09-27 | End: 2018-09-29 | Stop reason: HOSPADM

## 2018-09-27 RX ORDER — SIMVASTATIN 10 MG/1
10 TABLET, FILM COATED ORAL NIGHTLY
Status: DISCONTINUED | OUTPATIENT
Start: 2018-09-27 | End: 2018-09-29 | Stop reason: HOSPADM

## 2018-09-27 RX ORDER — POTASSIUM CHLORIDE 20 MEQ/15ML
60 SOLUTION ORAL ONCE
Status: COMPLETED | OUTPATIENT
Start: 2018-09-27 | End: 2018-09-27

## 2018-09-27 RX ORDER — AMOXICILLIN 250 MG
1 CAPSULE ORAL DAILY
Status: DISCONTINUED | OUTPATIENT
Start: 2018-09-27 | End: 2018-09-29 | Stop reason: HOSPADM

## 2018-09-27 RX ORDER — HEPARIN SODIUM 5000 [USP'U]/ML
5000 INJECTION, SOLUTION INTRAVENOUS; SUBCUTANEOUS EVERY 8 HOURS
Status: DISCONTINUED | OUTPATIENT
Start: 2018-09-27 | End: 2018-09-29 | Stop reason: HOSPADM

## 2018-09-27 RX ORDER — LORAZEPAM 0.5 MG/1
0.5 TABLET ORAL ONCE
Status: COMPLETED | OUTPATIENT
Start: 2018-09-27 | End: 2018-09-27

## 2018-09-27 RX ADMIN — HYDROCODONE BITARTRATE AND ACETAMINOPHEN 1 TABLET: 5; 325 TABLET ORAL at 06:09

## 2018-09-27 RX ADMIN — SODIUM CHLORIDE: 0.9 INJECTION, SOLUTION INTRAVENOUS at 05:09

## 2018-09-27 RX ADMIN — PANTOPRAZOLE SODIUM 40 MG: 40 INJECTION, POWDER, FOR SOLUTION INTRAVENOUS at 08:09

## 2018-09-27 RX ADMIN — SENNOSIDES AND DOCUSATE SODIUM 1 TABLET: 8.6; 5 TABLET ORAL at 09:09

## 2018-09-27 RX ADMIN — HYDROCODONE BITARTRATE AND ACETAMINOPHEN 1 TABLET: 5; 325 TABLET ORAL at 02:09

## 2018-09-27 RX ADMIN — HEPARIN SODIUM 5000 UNITS: 5000 INJECTION, SOLUTION INTRAVENOUS; SUBCUTANEOUS at 09:09

## 2018-09-27 RX ADMIN — HYDROCORTISONE 10 MG: 10 TABLET ORAL at 08:09

## 2018-09-27 RX ADMIN — MUPIROCIN 1 G: 20 OINTMENT TOPICAL at 09:09

## 2018-09-27 RX ADMIN — FAMOTIDINE 20 MG: 20 TABLET ORAL at 08:09

## 2018-09-27 RX ADMIN — ACETAMINOPHEN 650 MG: 325 TABLET, FILM COATED ORAL at 08:09

## 2018-09-27 RX ADMIN — SODIUM CHLORIDE: 0.9 INJECTION, SOLUTION INTRAVENOUS at 12:09

## 2018-09-27 RX ADMIN — Medication 3 ML: at 05:09

## 2018-09-27 RX ADMIN — HYDROCORTISONE 20 MG: 5 TABLET ORAL at 08:09

## 2018-09-27 RX ADMIN — SIMVASTATIN 10 MG: 10 TABLET, FILM COATED ORAL at 08:09

## 2018-09-27 RX ADMIN — LEVOTHYROXINE SODIUM 50 MCG: 50 TABLET ORAL at 05:09

## 2018-09-27 RX ADMIN — POTASSIUM CHLORIDE 60 MEQ: 20 SOLUTION ORAL at 08:09

## 2018-09-27 RX ADMIN — LISINOPRIL 10 MG: 2.5 TABLET ORAL at 08:09

## 2018-09-27 RX ADMIN — LORAZEPAM 0.5 MG: 0.5 TABLET ORAL at 09:09

## 2018-09-27 RX ADMIN — HYDROCODONE BITARTRATE AND ACETAMINOPHEN 1 TABLET: 5; 325 TABLET ORAL at 12:09

## 2018-09-27 NOTE — ASSESSMENT & PLAN NOTE
POD#1 s/p transphenoidal resection  - Admitted to [facility] for post-op monitoring  - Hourly neuro checks  - Hourly I/O, monitor for DI  - Neurosurgery following  - Nasal precautions  - SBP goal 100-160  - Hydrocortisone 20-10  - Daily BMP, more frequent if signs of DI  - Endocrinology following  - will stepdown to NSGY today

## 2018-09-27 NOTE — HOSPITAL COURSE
9/26: Transphenoidal pituitary mass resection   9/27 hemodynamically stable.removed nasal packing, outer nasal dressing saturated with bloody drainage. Transferred to 7th floor under NSGY service  9/28 Over night Na up 144, ur spec grav 1.00 ,urine output > 500cc DDAVP x1 given this a.m. Responded well urine output decreased. Will continue to follow sodium and sp. Grav. Restarted antianxiety meds.

## 2018-09-27 NOTE — H&P
[facility]  Neurocritical Care  History & Physical    Admit Date: 9/26/2018  Service Date: 09/26/2018  Length of Stay: 0    Subjective:     Chief Complaint: Pituitary tumor    History of Present Illness: Ms. Forrester is a 61 y/o female with pituitary macroadenoma presents to [facility] s/p transphenoidal resection of mass. Mass was found on MRI after incidental finding of R visual deficit during annual eye exam. She was then referred to NSGY who recommended resection to relieve pressure on optic chiasm. Patient will be admitted to [facility] for close post-operative monitoring overnight.     Past Medical History:   Diagnosis Date    Cataract     Hypertension     Pituitary adenoma 9/12/2018     Past Surgical History:   Procedure Laterality Date    CHOLECYSTECTOMY  2016       No current facility-administered medications on file prior to encounter.      Current Outpatient Medications on File Prior to Encounter   Medication Sig Dispense Refill    lisinopril 10 MG tablet Take 10 mg by mouth once daily.      metFORMIN (GLUCOPHAGE-XR) 500 MG 24 hr tablet Take 500 mg by mouth.      multivitamin capsule Take 1 capsule by mouth.      simvastatin (ZOCOR) 10 MG tablet Take 10 mg by mouth every evening.       Allergies: Patient has no known allergies.    Family History   Problem Relation Age of Onset    Cataracts Mother        Social History     Tobacco Use    Smoking status: Never Smoker    Smokeless tobacco: Never Used   Substance Use Topics    Alcohol use: Yes     Alcohol/week: 0.6 oz     Types: 1 Glasses of wine per week    Drug use: No      Review of Systems: Review of Systems   Constitutional: Negative for chills and fever.   HENT: Positive for sinus pain. Negative for congestion, ear pain and sore throat.    Eyes: Positive for redness. Negative for blurred vision, double vision and photophobia.        L eye redness, feels irritated   Respiratory: Negative for cough.    Cardiovascular: Negative for chest  pain.   Gastrointestinal: Positive for heartburn. Negative for abdominal pain, nausea and vomiting.   Genitourinary: Negative for frequency and urgency.   Musculoskeletal: Negative for back pain and neck pain.   Skin: Negative for itching.   Neurological: Positive for headaches. Negative for dizziness, tremors, focal weakness and seizures.           Vitals:   Temp: 97 °F (36.1 °C)  Pulse: 78  BP: (!) 146/69  MAP (mmHg): 104  Resp: 11  SpO2: 98 %  O2 Device (Oxygen Therapy): room air    Temp  Min: 97 °F (36.1 °C)  Max: 97.5 °F (36.4 °C)  Pulse  Min: 54  Max: 81  BP  Min: 116/68  Max: 151/79  MAP (mmHg)  Min: 87  Max: 111  Resp  Min: 10  Max: 20  SpO2  Min: 97 %  Max: 100 %    No intake/output data recorded.         Examination:   Constitutional: Well-nourished and -developed. No apparent distress.   Eyes: L eye with conjunctival irritation-- chloraprep present on lid. Anicteric. Lids no lesions.  Head/Ears/Nose/Mouth/Throat/Neck: R nares packed. Moist mucous membranes. External ears, nose atraumatic.   Cardiovascular: Regular rhythm. No murmurs. No leg edema.  Respiratory: Comfortable respirations. Clear to auscultation.  Gastrointestinal: No hernia. Soft, nondistended, nontender. + bowel sounds.    Neurologic:   -[id number]  -Alert. Oriented to person, place, and time. Speech fluent. Follows commands. Recent and remote memory adequate. Judgment good. Insight appropriate.  -Cranial nerves intact, particularly PERRL, shoulder shrug equal, face symmetric, EOMI  -Strength 5/5 and symmetric in arms, legs. Tone normal.   -Sensation intact to touch in arms, legs.    Today I independently reviewed pertinent medications, lines/drains/airways, imaging, lab results, notably:       Assessment/Plan:     Cardiac/Vascular   Hypertension    SBP goal 100-160  - Restart home lisinopril   - PRN IV antihypertensives         Renal/   Chronic kidney disease, stage III (moderate)    - Hourly I/O  - Daily BMP        Oncology   * Pituitary  tumor    POD0 s/p transphenoidal resection  - Admit to [facility] for post-op monitoring  - Hourly neuro checks  - Hourly I/O, monitor for DI  - Neurosurgery following  - Nasal precautions  - SBP goal 100-160  - Hydrocortisone 20-10  - Daily BMP, more frequent if signs of DI  - Endocrinology following          Endocrine   Other specified hypothyroidism    - Continue home levothyroxine  - Endocrinology following         Type 2 diabetes mellitus without retinopathy    SSI  - Hemoglobin A1c pending  - Endocrinology following  - Hold home metformin while inpatient             Prophylaxis:  Venous Thromboembolism: mechanical  Stress Ulcer: PPI  Ventilator Pneumonia: not applicable     Activity Orders          None        Prior    Tena Cabrales PA-C  Neurocritical Care  [facility]

## 2018-09-27 NOTE — PROGRESS NOTES
"[facility]  Endocrinology  Progress Note    Admit Date: 2018     A 63 yo woman with a diagnosis of pituitary macroadenoma underwent transphenoidal resection of the pituitary adenoma on . Endocrinology was consulted for assistance with post-op monitoring.   She was recently seen in endocrine clinic by Dr. Alves on .   She also has medical history of type 2 DM, HTN, CKD, HLD.    Pt was noted to have visual field defects when she went for her annual eye exam in Aug, 2018. Further workup revealed a large sellar mass with suprasellar extension.     Labs:   Prolactin: 53.4 (elevation was thought to be due to stalk effect)    As per the notes, pt had mildly low FT4. She was started on Levothyroxine 50 mcg, daily.      Interval HPI:   Overnight events:  VS stable    Eatin%  Nausea: No  Hypoglycemia and intervention: No  Fever: No  TPN and/or TF: No    /62 (BP Location: Right arm, Patient Position: Lying)   Pulse 83   Temp 98.9 °F (37.2 °C) (Axillary)   Resp 18   Ht 5' 5" (1.651 m)   Wt 100.5 kg (221 lb 9 oz)   SpO2 99%   Breastfeeding? No   BMI 36.87 kg/m²      Labs Reviewed and Include    Recent Labs   Lab  18   0809   GLU  152*   CALCIUM  7.9*   ALBUMIN  3.0*   PROT  5.4*   NA  134*   K  3.1*   CO2  19*   CL  106   BUN  12   CREATININE  0.8   ALKPHOS  74   ALT  27   AST  19   BILITOT  0.4     Lab Results   Component Value Date    WBC 15.55 (H) 2018    HGB 10.6 (L) 2018    HCT 31.6 (L) 2018    MCV 92 2018     2018     No results for input(s): TSH, FREET4 in the last 168 hours.  Lab Results   Component Value Date    HGBA1C 5.4 2018       Nutritional status:   Body mass index is 36.87 kg/m².  Lab Results   Component Value Date    ALBUMIN 3.0 (L) 2018     No results found for: PREALBUMIN    Estimated Creatinine Clearance: 84.5 mL/min (based on SCr of 0.8 mg/dL).    Accu-Checks  Recent Labs      18   0643  " 09/26/18   1135  09/26/18 2023 09/26/18   2203  09/27/18   0520   POCTGLUCOSE  91  135*  154*  147*  148*       Current Medications and/or Treatments Impacting Glycemic Control  Immunotherapy:    Immunosuppressants     None        Steroids:   Hormones (From admission, onward)    Start     Stop Route Frequency Ordered    09/27/18 0900  hydrocortisone tablet 20 mg      -- Oral Daily 09/26/18 1125    09/26/18 2100  hydrocortisone tablet 10 mg      -- Oral Nightly 09/26/18 1125        Pressors:    Autonomic Drugs (From admission, onward)    None        Hyperglycemia/Diabetes Medications:   Antihyperglycemics (From admission, onward)    Start     Stop Route Frequency Ordered    09/26/18 1729  insulin aspart U-100 pen 0-4 Units      -- SubQ As needed (PRN) 09/26/18 1629          ASSESSMENT and PLAN    * Pituitary tumor    S/p resection    Pt has no h/o adrenal insufficiency or excess.   Currently on Hydrocortisone 20 mg daily and 10 mg, hs.    Pt has h/o mild hypothyroidism.  On PO Levothyroxine 50 mcg, daily at home.    Pt has no h/o diabetes insipidus.    Recommendations:   -Continue current dose of hydrocortisone. If pt becomes unstable with hypotension, would start stress dose steroids (Hydrocortisone 100 mg Q8h).  -Continue PO Levothyroxine 50 mcg, qAM.  -Monitor for diabetes insipidus: would give desmopressin only if urine output>250cc for x2 consecutive hours with Na >145 and low spec gravity   -Monitor STRICT I/Os                Type 2 diabetes mellitus without retinopathy    Type 2 DM  On Metformin 500 mg daily at home    Goal BG while inpatient: 140-180 mg/dl    Recommendations:   -Hold metformin   -POC AC/HS  -Low dose correction                Jaimie Lucio MD  Endocrinology  [facility]

## 2018-09-27 NOTE — SUBJECTIVE & OBJECTIVE
Interval History: 9/27: Patient doing well this AM, NAEON, exam stable, AFVSS, Na 128 today (141 yesterday), will recheck lab and likely TTF if entered in error    Medications:  Continuous Infusions:   sodium chloride 0.9% 100 mL/hr at 09/27/18 0902     Scheduled Meds:   hydrocortisone  10 mg Oral QHS    hydrocortisone  20 mg Oral Daily    levothyroxine  50 mcg Oral Before breakfast    lisinopril  10 mg Oral Daily    mupirocin  1 g Nasal BID    pantoprazole  40 mg Intravenous Daily    senna-docusate 8.6-50 mg  1 tablet Oral Daily     PRN Meds:acetaminophen, acetaminophen, dextrose 50%, dextrose 50%, glucagon (human recombinant), glucose, glucose, HYDROcodone-acetaminophen, HYDROmorphone, insulin aspart U-100, ondansetron, sodium chloride 0.9%     Review of Systems  Objective:     Weight: 100.5 kg (221 lb 9 oz)  Body mass index is 36.87 kg/m².  Vital Signs (Most Recent):  Temp: 98.9 °F (37.2 °C) (09/27/18 0702)  Pulse: 83 (09/27/18 0902)  Resp: 18 (09/27/18 0902)  BP: 130/62 (09/27/18 0902)  SpO2: 99 % (09/27/18 0902) Vital Signs (24h Range):  Temp:  [97 °F (36.1 °C)-98.9 °F (37.2 °C)] 98.9 °F (37.2 °C)  Pulse:  [54-85] 83  Resp:  [9-20] 18  SpO2:  [96 %-100 %] 99 %  BP: (116-165)/(56-91) 130/62  Arterial Line BP: (135-175)/(56-78) 147/68     Date 09/27/18 0700 - 09/28/18 0659   Shift 5537-8486 9974-5567 8580-9183 24 Hour Total   INTAKE   P.O. 180   180   I.V.(mL/kg) 295(2.9)   295(2.9)   Shift Total(mL/kg) 475(4.7)   475(4.7)   OUTPUT   Urine(mL/kg/hr) 205   205   Shift Total(mL/kg) 205(2)   205(2)   Weight (kg) 100.5 100.5 100.5 100.5                        Urethral Catheter 09/26/18 0826 Non-latex;Straight-tip 16 Fr. (Active)   Site Assessment Clean;Intact 9/27/2018  7:02 AM   Collection Container Urimeter 9/27/2018  7:02 AM   Securement Method secured to top of thigh w/ adhesive device 9/27/2018  7:02 AM   Catheter Care Performed yes 9/27/2018  7:02 AM   Reason for Continuing Urinary Catheterization  "Critically ill in ICU requiring intensive monitoring;Post operative 9/27/2018  7:02 AM   CAUTI Prevention Bundle Intact seal between catheter & drainage tubing;StatLock in place w 1" slack;Drainage bag off the floor;Green sheeting clip in use;No dependent loops or kinks;Drainage bag not overfilled (<2/3 full);Drainage bag below bladder 9/27/2018  7:02 AM   Output (mL) 75 mL 9/27/2018  9:02 AM       Neurosurgery Physical Exam  General: AOx3, GCS E4V5M6  CNII-XII: Intact on fine exam, PERRL, EOMi, facial sensation preserved, no facial assymetry, tongue/uvula/palate midline, shoulder shrug equal, No pronator drift  Extremities: 5/5 motor throughout, sensorium intact throughout, coordination intact throughout    Significant Labs:  Recent Labs   Lab  09/26/18   1134  09/27/18   0530  09/27/18   0809   GLU  140*  151*  152*   NA  141  128*  134*   K  4.0  3.1*  3.1*   CL  109  102  106   CO2  23  17*  19*   BUN  18  12  12   CREATININE  0.9  0.8  0.8   CALCIUM  8.1*  8.1*  7.9*   MG   --   1.9   --      Recent Labs   Lab  09/26/18   1042  09/26/18   1134  09/27/18   0530   WBC   --   5.73  15.55*   HGB   --   11.0*  10.6*   HCT  28*  32.2*  31.6*   PLT   --   272  313     No results for input(s): LABPT, INR, APTT in the last 48 hours.  Microbiology Results (last 7 days)     ** No results found for the last 168 hours. **        ABGs:   Recent Labs   Lab  09/26/18   0925  09/26/18   1042   PH  7.451*  7.443   PCO2  32.4*  33.7*   PO2  98  92   HCO3  22.6*  23.1*   POCSATURATED  98  98   BE  -1  -1     Cardiac markers: No results for input(s): CKMB, CPKMB, TROPONINT, TROPONINI, MYOGLOBIN in the last 48 hours.  CMP:   Recent Labs   Lab  09/26/18   1134  09/27/18   0530  09/27/18   0809   GLU  140*  151*  152*   CALCIUM  8.1*  8.1*  7.9*   ALBUMIN   --    --   3.0*   PROT   --    --   5.4*   NA  141  128*  134*   K  4.0  3.1*  3.1*   CO2  23  17*  19*   CL  109  102  106   BUN  18  12  12   CREATININE  0.9  0.8  0.8   ALKPHOS  "  --    --   74   ALT   --    --   27   AST   --    --   19   BILITOT   --    --   0.4     CRP: No results for input(s): CRP in the last 48 hours.  ESR: No results for input(s): POCESR, ERYTHROCYTES in the last 48 hours.  LFTs:   Recent Labs   Lab  09/27/18   0809   ALT  27   AST  19   ALKPHOS  74   BILITOT  0.4   PROT  5.4*   ALBUMIN  3.0*     Procalcitonin: No results for input(s): PROCAL in the last 48 hours.    Significant Diagnostics:  I have reviewed and interpreted all pertinent imaging results/findings within the past 24 hours.

## 2018-09-27 NOTE — ASSESSMENT & PLAN NOTE
62F w/ PMH HTN, T2DM, CKD, L vitreous detachment who presents for elective TSR for tumor removal 9/26:    --Patient admitted to ICU, preponderance of medical care per NCC   -Stable for TTF today   --F/U AM serum osm and NA  --F/U cortisol from 9/27  --Strict I/O's  --F/U endocrine consult, appreciate reccs  --Continue hydrocortisone 20/10 QD/QHS  --Nasal pack out in AM

## 2018-09-27 NOTE — NURSING TRANSFER
Nursing Transfer Note      9/26/2018     Transfer To: [address]    Transfer via bed    Transfer with  to O2, cardiac monitoring, on a monitor     Transported by RN    Medicines sent: IV meds    Chart send with patient: Yes    Notified:

## 2018-09-27 NOTE — NURSING
Pt nasal dressing has serosanguinous drainage from surgical site.  Dressing was changed at 0900 by neurosurgery, dressing now 90% saturated.  Neurosurgery paged, awaiting call back.  [facility] notified.  Will continue to monitor.

## 2018-09-27 NOTE — HPI
Ms. Frorester is a 61 y/o female with pituitary macroadenoma presents to [facility] s/p transphenoidal resection of mass. Mass was found on MRI after incidental finding of R visual deficit during annual eye exam. She was then referred to NSGY who recommended resection to relieve pressure on optic chiasm. Patient will be admitted to [facility] for close post-operative monitoring overnight.

## 2018-09-27 NOTE — PROGRESS NOTES
[facility]  Neurosurgery  Progress Note    Subjective:     History of Present Illness: No notes on file    Post-Op Info:  Procedure(s) (LRB):  RESECTION, NEOPLASM, PITUITARY, TRANSSPHENOIDAL APPROACH (N/A)   1 Day Post-Op     Interval History: 9/27: Patient doing well this AM, NAEON, exam stable, AFVSS, Na 128 today (141 yesterday), will recheck lab and likely TTF if entered in error    Medications:  Continuous Infusions:   sodium chloride 0.9% 100 mL/hr at 09/27/18 0902     Scheduled Meds:   hydrocortisone  10 mg Oral QHS    hydrocortisone  20 mg Oral Daily    levothyroxine  50 mcg Oral Before breakfast    lisinopril  10 mg Oral Daily    mupirocin  1 g Nasal BID    pantoprazole  40 mg Intravenous Daily    senna-docusate 8.6-50 mg  1 tablet Oral Daily     PRN Meds:acetaminophen, acetaminophen, dextrose 50%, dextrose 50%, glucagon (human recombinant), glucose, glucose, HYDROcodone-acetaminophen, HYDROmorphone, insulin aspart U-100, ondansetron, sodium chloride 0.9%     Review of Systems  Objective:     Weight: 100.5 kg (221 lb 9 oz)  Body mass index is 36.87 kg/m².  Vital Signs (Most Recent):  Temp: 98.9 °F (37.2 °C) (09/27/18 0702)  Pulse: 83 (09/27/18 0902)  Resp: 18 (09/27/18 0902)  BP: 130/62 (09/27/18 0902)  SpO2: 99 % (09/27/18 0902) Vital Signs (24h Range):  Temp:  [97 °F (36.1 °C)-98.9 °F (37.2 °C)] 98.9 °F (37.2 °C)  Pulse:  [54-85] 83  Resp:  [9-20] 18  SpO2:  [96 %-100 %] 99 %  BP: (116-165)/(56-91) 130/62  Arterial Line BP: (135-175)/(56-78) 147/68     Date 09/27/18 0700 - 09/28/18 0659   Shift 5449-2977 4612-3229 0157-5754 24 Hour Total   INTAKE   P.O. 180   180   I.V.(mL/kg) 295(2.9)   295(2.9)   Shift Total(mL/kg) 475(4.7)   475(4.7)   OUTPUT   Urine(mL/kg/hr) 205   205   Shift Total(mL/kg) 205(2)   205(2)   Weight (kg) 100.5 100.5 100.5 100.5                        Urethral Catheter 09/26/18 0826 Non-latex;Straight-tip 16 Fr. (Active)   Site Assessment Clean;Intact  "9/27/2018  7:02 AM   Collection Container Urimeter 9/27/2018  7:02 AM   Securement Method secured to top of thigh w/ adhesive device 9/27/2018  7:02 AM   Catheter Care Performed yes 9/27/2018  7:02 AM   Reason for Continuing Urinary Catheterization Critically ill in ICU requiring intensive monitoring;Post operative 9/27/2018  7:02 AM   CAUTI Prevention Bundle Intact seal between catheter & drainage tubing;StatLock in place w 1" slack;Drainage bag off the floor;Green sheeting clip in use;No dependent loops or kinks;Drainage bag not overfilled (<2/3 full);Drainage bag below bladder 9/27/2018  7:02 AM   Output (mL) 75 mL 9/27/2018  9:02 AM       Neurosurgery Physical Exam  General: AOx3, GCS E4V5M6  CNII-XII: Intact on fine exam, PERRL, EOMi, facial sensation preserved, no facial assymetry, tongue/uvula/palate midline, shoulder shrug equal, No pronator drift  Extremities: 5/5 motor throughout, sensorium intact throughout, coordination intact throughout    Significant Labs:  Recent Labs   Lab  09/26/18   1134  09/27/18   0530  09/27/18   0809   GLU  140*  151*  152*   NA  141  128*  134*   K  4.0  3.1*  3.1*   CL  109  102  106   CO2  23  17*  19*   BUN  18  12  12   CREATININE  0.9  0.8  0.8   CALCIUM  8.1*  8.1*  7.9*   MG   --   1.9   --      Recent Labs   Lab  09/26/18   1042  09/26/18   1134  09/27/18   0530   WBC   --   5.73  15.55*   HGB   --   11.0*  10.6*   HCT  28*  32.2*  31.6*   PLT   --   272  313     No results for input(s): LABPT, INR, APTT in the last 48 hours.  Microbiology Results (last 7 days)     ** No results found for the last 168 hours. **        ABGs:   Recent Labs   Lab  09/26/18   0925  09/26/18   1042   PH  7.451*  7.443   PCO2  32.4*  33.7*   PO2  98  92   HCO3  22.6*  23.1*   POCSATURATED  98  98   BE  -1  -1     Cardiac markers: No results for input(s): CKMB, CPKMB, TROPONINT, TROPONINI, MYOGLOBIN in the last 48 hours.  CMP:   Recent Labs   Lab  09/26/18   1134  09/27/18   0530  09/27/18   " 0809   GLU  140*  151*  152*   CALCIUM  8.1*  8.1*  7.9*   ALBUMIN   --    --   3.0*   PROT   --    --   5.4*   NA  141  128*  134*   K  4.0  3.1*  3.1*   CO2  23  17*  19*   CL  109  102  106   BUN  18  12  12   CREATININE  0.9  0.8  0.8   ALKPHOS   --    --   74   ALT   --    --   27   AST   --    --   19   BILITOT   --    --   0.4     CRP: No results for input(s): CRP in the last 48 hours.  ESR: No results for input(s): POCESR, ERYTHROCYTES in the last 48 hours.  LFTs:   Recent Labs   Lab  09/27/18   0809   ALT  27   AST  19   ALKPHOS  74   BILITOT  0.4   PROT  5.4*   ALBUMIN  3.0*     Procalcitonin: No results for input(s): PROCAL in the last 48 hours.    Significant Diagnostics:  I have reviewed and interpreted all pertinent imaging results/findings within the past 24 hours.    Assessment/Plan:     * Pituitary tumor    62F w/ PMH HTN, T2DM, CKD, L vitreous detachment who presents for elective TSR for tumor removal 9/26:    --Patient admitted to ICU, preponderance of medical care per NCC   -Stable for TTF today   --F/U AM serum osm and NA  --F/U cortisol from 9/27  --Strict I/O's  --F/U endocrine consult, appreciate reccs  --Continue hydrocortisone 20/10 QD/QHS  --Nasal pack out in AM            Micheal Rowley MD  Neurosurgery  [facility]

## 2018-09-27 NOTE — PLAN OF CARE
[id number] - [place], [place] - [address] AT [facility] [address]  [address] [address]  Phone: [phone] Fax: [phone]    This CM spoke with patient at bedside.       09/27/18 1410   Discharge Assessment   Assessment Type Discharge Planning Assessment   Confirmed/corrected address and phone number on facesheet? Yes   Assessment information obtained from? Patient   Expected Length of Stay (days) 3   Communicated expected length of stay with patient/caregiver no   Prior to hospitilization cognitive status: Alert/Oriented   Prior to hospitalization functional status: Independent   Current cognitive status: Alert/Oriented   Current Functional Status: Needs Assistance   Facility Arrived From: from home with sister   Lives With alone   Able to Return to Prior Arrangements yes   Is patient able to care for self after discharge? Unable to determine at this time (comments)   Who are your caregiver(s) and their phone number(s)? friend Tonie Kirk [phone]   Patient's perception of discharge disposition home or selfcare   Readmission Within The Last 30 Days no previous admission in last 30 days   Patient currently being followed by outpatient case management? No   Patient currently receives any other outside agency services? No   Equipment Currently Used at Home none   Do you have any problems affording any of your prescribed medications? No   Is the patient taking medications as prescribed? yes   Does the patient have transportation home? Yes   Transportation Available family or friend will provide   Does the patient receive services at the [facility]? No   Discharge Plan A Home with family   Discharge Plan B Home Health   Patient/Family In Agreement With Plan yes     Heidy Martinez RN/BSN/TRACEY  [phone]  [facility]

## 2018-09-27 NOTE — PROGRESS NOTES
[facility]  Neurocritical Care  Progress Note    Admit Date: 9/26/2018  Service Date: 09/27/2018  Length of Stay: 1    Subjective:     Chief Complaint: Pituitary tumor    History of Present Illness: Ms. Forrester is a 63 y/o female with pituitary macroadenoma presents to [facility] s/p transphenoidal resection of mass. Mass was found on MRI after incidental finding of R visual deficit during annual eye exam. She was then referred to NSGY who recommended resection to relieve pressure on optic chiasm. Patient will be admitted to [facility] for close post-operative monitoring overnight.     Hospital Course: 9/26: Transphenoidal pituitary mass resection   9/27 hemodynamically stable.removed nasal packing, outer nasal dressing saturated with bloody drainage. Transferred to 7th floor under NSGY service     Interval History: hemodynamically stable.removed nasal packing, outer nasal dressing saturated with bloody drainage. Transferred to 7th floor under NSGY service       Review of Symptoms:    Constitutional: Denies fevers, weight loss, chills, or weakness.  Eyes: Denies changes in vision.  ENT: + sinus pain,   bloody drainage after packing removed Denies dysphagia, ear pain or discharge.  Cardiovascular: Denies chest pain, palpitations, orthopnea, or claudication.  Respiratory: Denies shortness of breath, cough, hemoptysis, or wheezing.  GI: Denies nausea/vomitting, hematochezia, melena, abd pain, or changes in appetite.  : Denies dysuria, incontinence, or hematuria.  Musculoskeletal: Denies joint pain or myalgias.  Skin/breast: Denies rashes, lumps, lesions, or discharge.  Neurologic: + headache, dizziness, vertigo, or paresthesias.  Psychiatric: Denies changes in mood or hallucinations.  Endocrine: Denies polyuria, polydipsia, heat/cold intolerance.  Hematologic/Lymph: Denies lymphadenopathy, easy bruising or easy bleeding.  Allergic/Immunologic: Denies rash, rhinitis.     Vitals:   Temp: 98.3 °F (36.8 °C)  Pulse:  87  Rhythm: normal sinus rhythm  BP: 123/61  MAP (mmHg): 86  Resp: 13  SpO2: 99 %  O2 Device (Oxygen Therapy): room air    Temp  Min: 97 °F (36.1 °C)  Max: 98.9 °F (37.2 °C)  Pulse  Min: 54  Max: 87  BP  Min: 116/58  Max: 165/80  MAP (mmHg)  Min: 81  Max: 111  Resp  Min: 9  Max: 20  SpO2  Min: 96 %  Max: 100 %    09/26 0701 - 09/27 0700  In: 4931.7 [P.O.:600; I.V.:4331.7]  Out: 1555 [Urine:1555]         Examination:   Constitutional: Well-nourished and -developed. No apparent distress.   Eyes: Conjunctiva clear, anicteric. Lids no lesions.  Head/Ears/Nose/Mouth/Throat/Neck: Moist mucous membranes. External ears, nose atraumatic.   Cardiovascular: Regular rhythm. No murmurs. No leg edema.  Respiratory: Comfortable respirations. Clear to auscultation.  Gastrointestinal: No hernia. Soft, nondistended, nontender. + bowel sounds.    Neurologic:  -GCS E4V5M6  -Alert. Oriented to person, place, and time. Speech fluent. Follows commands.  -Cranial nerves II-XII intact, particularly PERRL 3+, EOMI. Facial symmetry,  Shoulder shrug equal  -Motor 5/5 and symmetric in arms and legs. Tone normal  -Sensation bilat intact to all extremities    Medications:   Continuous  sodium chloride 0.9% Last Rate: 100 mL/hr at 09/27/18 1102   Scheduled  hydrocortisone 10 mg QHS   hydrocortisone 20 mg Daily   levothyroxine 50 mcg Before breakfast   lisinopril 10 mg Daily   mupirocin 1 g BID   pantoprazole 40 mg Daily   senna-docusate 8.6-50 mg 1 tablet Daily   simvastatin 10 mg QHS   PRN  acetaminophen 650 mg Q6H PRN   acetaminophen 650 mg Q6H PRN   dextrose 50% 12.5 g PRN   dextrose 50% 25 g PRN   glucagon (human recombinant) 1 mg PRN   glucose 16 g PRN   glucose 24 g PRN   HYDROcodone-acetaminophen 1 tablet Q4H PRN   HYDROmorphone 0.5 mg Q3H PRN   insulin aspart U-100 0-4 Units PRN   ondansetron 8 mg Q8H PRN   sodium chloride 0.9% 3 mL PRN      Today I independently reviewed pertinent medications, lines/drains/airways, imaging, lab results,  microbiology results, notably:     ISTAT: No results for input(s): PH, PCO2, PO2, POCSATURATED, HCO3, BE, POCNA, POCK, POCTCO2, POCGLU, POCICA, POCLAC, SAMPLE in the last 24 hours.   Chem: Recent Labs   Lab  09/27/18   0530  09/27/18   0809   NA  128*  134*   K  3.1*  3.1*   CL  102  106   CO2  17*  19*   GLU  151*  152*   BUN  12  12   CREATININE  0.8  0.8   ESTGFRAFRICA  >60.0  >60.0   EGFRNONAA  >60.0  >60.0   CALCIUM  8.1*  7.9*   MG  1.9   --    PHOS  2.8   --    ANIONGAP  9  9   PROT   --   5.4*   ALBUMIN   --   3.0*   BILITOT   --   0.4   ALKPHOS   --   74   AST   --   19   ALT   --   27     Heme: Recent Labs   Lab  09/27/18   0530   WBC  15.55*   HGB  10.6*   HCT  31.6*   PLT  313     Endo:   Recent Labs   Lab  09/26/18 2023 09/26/18 2203 09/27/18   0520   POCTGLUCOSE  154*  147*  148*      Assessment/Plan:     Cardiac/Vascular   Hyperlipidemia    restarted simvastatin 10mg daily        Hypertension    SBP goal 100-160  - Restart home lisinopril   - PRN IV antihypertensives         Renal/   Chronic kidney disease, stage III (moderate)    - Hourly I/O  - Daily BMP        Oncology   * Pituitary tumor    POD#1 s/p transphenoidal resection  - Admitted to [facility] for post-op monitoring  - Hourly neuro checks  - Hourly I/O, monitor for DI  - Neurosurgery following  - Nasal precautions  - SBP goal 100-160  - Hydrocortisone 20-10  - Daily BMP, more frequent if signs of DI  - Endocrinology following  - will stepdown to NSGY today          Endocrine   Other specified hypothyroidism    - Continue home levothyroxine 50mcg daily  - Endocrinology following         Type 2 diabetes mellitus without retinopathy    accuchecks ac/hs with SSI  - Hemoglobin A1c 5.4  - Endocrinology following  - Hold home metformin while inpatient             Prophylaxis:  Venous Thromboembolism: mechanical  Stress Ulcer: PPI  Ventilator Pneumonia: not applicable     Activity Orders          Discontinue arterial line starting at 09/27 1122         Prior     I have spent 35 min with this patient, with over 50% of this time spent coordinating care and speaking with the family    Ayla Rome NP  Neurocritical Care  [facility]

## 2018-09-27 NOTE — HOSPITAL COURSE
9/26: TSR for adenoma  9/27: Patient doing well this AM, NAEON, exam stable, AFVSS, Na 128 today (141 yesterday), will recheck lab and likely TTF if entered in error  9/28: given DDAVP once, stable Na since. Stable neuro exam  9/29: UOP stable and Na stable, Patient stable for discharge with 6w follow-up with Dr. Mcadams

## 2018-09-27 NOTE — NURSING
Patient arrived to [place] [address] from PACU    Type of stroke/diagnosis: Pituitary tumor resection     TPA start and end time (if applicable): n/a    Thrombectomy start and end time (if applicable): n/a    Current symptoms: head ache     Skin assessment done: Y     Wounds noted: none     NCC notified: name of person notified: Markos HAWK

## 2018-09-27 NOTE — SUBJECTIVE & OBJECTIVE
"Interval HPI:   Overnight events:  VS stable    Eatin%  Nausea: No  Hypoglycemia and intervention: No  Fever: No  TPN and/or TF: No    /62 (BP Location: Right arm, Patient Position: Lying)   Pulse 83   Temp 98.9 °F (37.2 °C) (Axillary)   Resp 18   Ht 5' 5" (1.651 m)   Wt 100.5 kg (221 lb 9 oz)   SpO2 99%   Breastfeeding? No   BMI 36.87 kg/m²     Labs Reviewed and Include    Recent Labs   Lab  18   0809   GLU  152*   CALCIUM  7.9*   ALBUMIN  3.0*   PROT  5.4*   NA  134*   K  3.1*   CO2  19*   CL  106   BUN  12   CREATININE  0.8   ALKPHOS  74   ALT  27   AST  19   BILITOT  0.4     Lab Results   Component Value Date    WBC 15.55 (H) 2018    HGB 10.6 (L) 2018    HCT 31.6 (L) 2018    MCV 92 2018     2018     No results for input(s): TSH, FREET4 in the last 168 hours.  Lab Results   Component Value Date    HGBA1C 5.4 2018       Nutritional status:   Body mass index is 36.87 kg/m².  Lab Results   Component Value Date    ALBUMIN 3.0 (L) 2018     No results found for: PREALBUMIN    Estimated Creatinine Clearance: 84.5 mL/min (based on SCr of 0.8 mg/dL).    Accu-Checks  Recent Labs      18   0643  18   1135  18   2023  18   2203  18   0520   POCTGLUCOSE  91  135*  154*  147*  148*       Current Medications and/or Treatments Impacting Glycemic Control  Immunotherapy:    Immunosuppressants     None        Steroids:   Hormones (From admission, onward)    Start     Stop Route Frequency Ordered    18 0900  hydrocortisone tablet 20 mg      -- Oral Daily 18 1125    18 2100  hydrocortisone tablet 10 mg      -- Oral Nightly 18 1125        Pressors:    Autonomic Drugs (From admission, onward)    None        Hyperglycemia/Diabetes Medications:   Antihyperglycemics (From admission, onward)    Start     Stop Route Frequency Ordered    18 1729  insulin aspart U-100 pen 0-4 Units      -- SubQ As needed " (PRN) 09/26/18 5202

## 2018-09-27 NOTE — PLAN OF CARE
Problem: Patient Care Overview  Goal: Plan of Care Review  Outcome: Revised  POC reviewed with pt and her sister at 1400. Pt and her sister verbalized understanding. Questions and concerns addressed.  Neuro assessment remains unchanged.  PRN pain med given 1 time throughout shift.  Pt HOB remains <30.  Pt had a bath today.  Will continue to monitor. See flowsheets for full assessment and VS info.

## 2018-09-27 NOTE — ASSESSMENT & PLAN NOTE
POD0 s/p transphenoidal resection  - Admit to [facility] for post-op monitoring  - Hourly neuro checks  - Hourly I/O, monitor for DI  - Neurosurgery following  - Nasal precautions  - SBP goal 100-160  - Hydrocortisone 20-10  - Daily BMP, more frequent if signs of DI  - Endocrinology following

## 2018-09-27 NOTE — NURSING
Pt urine output increased from /hr to 325-350/hr.  Ayla with [facility] notified.  Stat serum sodium ordered.  Will continue to monitor.

## 2018-09-27 NOTE — ASSESSMENT & PLAN NOTE
accuchecks ac/hs with SSI  - Hemoglobin A1c 5.4  - Endocrinology following  - Hold home metformin while inpatient

## 2018-09-27 NOTE — PLAN OF CARE
Problem: Patient Care Overview  Goal: Plan of Care Review  Outcome: Ongoing (interventions implemented as appropriate)  POC reviewed with patient at 0400. Patient verbalized understanding. Questions and concerns addressed. Hydrocodone-acetaminophen given PRN  x2 for headache pain. Strict I/O monitoring. HOB currently less than 30 degrees. NS @ 100. Patient remains free of injury. RN will continue to monitor. See flowsheets for full assessment and VS info

## 2018-09-27 NOTE — PROGRESS NOTES
RN notified NCC that patient has new onset serosanguineous drainage from already saturated nasal packing from surgical site. No interventions at this time for nasal drainage per JOSE R Cabrales. RN also notified NCC that patient is c/o anxiety and usually takes ativan PRN and Wellbutrin. Keron instructed RN that no anxiety meds are to be given for close monitoring of patient's neuro status overnight. RN will continue to monitor.

## 2018-09-28 PROBLEM — F32.A ANXIETY AND DEPRESSION: Status: ACTIVE | Noted: 2018-09-28

## 2018-09-28 PROBLEM — F41.9 ANXIETY AND DEPRESSION: Status: ACTIVE | Noted: 2018-09-28

## 2018-09-28 LAB
ALBUMIN SERPL BCP-MCNC: 3.2 G/DL
ALP SERPL-CCNC: 68 U/L
ALT SERPL W/O P-5'-P-CCNC: 24 U/L
ANION GAP SERPL CALC-SCNC: 5 MMOL/L
AST SERPL-CCNC: 19 U/L
BASOPHILS # BLD AUTO: 0.02 K/UL
BASOPHILS NFR BLD: 0.2 %
BILIRUB SERPL-MCNC: 0.3 MG/DL
BILIRUB UR QL STRIP: NEGATIVE
BUN SERPL-MCNC: 11 MG/DL
CALCIUM SERPL-MCNC: 8.4 MG/DL
CHLORIDE SERPL-SCNC: 116 MMOL/L
CLARITY UR REFRACT.AUTO: CLEAR
CO2 SERPL-SCNC: 23 MMOL/L
COLOR UR AUTO: NORMAL
CREAT SERPL-MCNC: 0.9 MG/DL
DIFFERENTIAL METHOD: ABNORMAL
EOSINOPHIL # BLD AUTO: 0 K/UL
EOSINOPHIL NFR BLD: 0.1 %
ERYTHROCYTE [DISTWIDTH] IN BLOOD BY AUTOMATED COUNT: 13.4 %
EST. GFR  (AFRICAN AMERICAN): >60 ML/MIN/1.73 M^2
EST. GFR  (NON AFRICAN AMERICAN): >60 ML/MIN/1.73 M^2
GLUCOSE SERPL-MCNC: 134 MG/DL
GLUCOSE UR QL STRIP: NEGATIVE
HCT VFR BLD AUTO: 31.6 %
HGB BLD-MCNC: 10.6 G/DL
HGB UR QL STRIP: NEGATIVE
IMM GRANULOCYTES # BLD AUTO: 0.13 K/UL
IMM GRANULOCYTES NFR BLD AUTO: 1.1 %
KETONES UR QL STRIP: NEGATIVE
LEUKOCYTE ESTERASE UR QL STRIP: NEGATIVE
LYMPHOCYTES # BLD AUTO: 1.5 K/UL
LYMPHOCYTES NFR BLD: 12.7 %
MAGNESIUM SERPL-MCNC: 2.4 MG/DL
MCH RBC QN AUTO: 30.8 PG
MCHC RBC AUTO-ENTMCNC: 33.5 G/DL
MCV RBC AUTO: 92 FL
MONOCYTES # BLD AUTO: 1 K/UL
MONOCYTES NFR BLD: 8.3 %
NEUTROPHILS # BLD AUTO: 9.2 K/UL
NEUTROPHILS NFR BLD: 77.6 %
NITRITE UR QL STRIP: NEGATIVE
NRBC BLD-RTO: 0 /100 WBC
OSMOLALITY SERPL: 307 MOSM/KG
OSMOLALITY UR: 108 MOSM/KG
PH UR STRIP: 7 [PH] (ref 5–8)
PHOSPHATE SERPL-MCNC: 1.6 MG/DL
PLATELET # BLD AUTO: 309 K/UL
PMV BLD AUTO: 10.6 FL
POCT GLUCOSE: 128 MG/DL (ref 70–110)
POCT GLUCOSE: 129 MG/DL (ref 70–110)
POCT GLUCOSE: 129 MG/DL (ref 70–110)
POCT GLUCOSE: 143 MG/DL (ref 70–110)
POCT GLUCOSE: 147 MG/DL (ref 70–110)
POTASSIUM SERPL-SCNC: 3.7 MMOL/L
POTASSIUM SERPL-SCNC: 4.1 MMOL/L
PROT SERPL-MCNC: 5.9 G/DL
PROT UR QL STRIP: NEGATIVE
RBC # BLD AUTO: 3.44 M/UL
SODIUM SERPL-SCNC: 143 MMOL/L
SODIUM SERPL-SCNC: 144 MMOL/L
SP GR UR STRIP: 1.01 (ref 1–1.03)
URN SPEC COLLECT METH UR: NORMAL
UROBILINOGEN UR STRIP-ACNC: NEGATIVE EU/DL
WBC # BLD AUTO: 11.85 K/UL

## 2018-09-28 PROCEDURE — 25000003 PHARM REV CODE 250: Performed by: STUDENT IN AN ORGANIZED HEALTH CARE EDUCATION/TRAINING PROGRAM

## 2018-09-28 PROCEDURE — 20600001 HC STEP DOWN PRIVATE ROOM

## 2018-09-28 PROCEDURE — 85025 COMPLETE CBC W/AUTO DIFF WBC: CPT

## 2018-09-28 PROCEDURE — 84132 ASSAY OF SERUM POTASSIUM: CPT

## 2018-09-28 PROCEDURE — 25000003 PHARM REV CODE 250: Performed by: PHYSICIAN ASSISTANT

## 2018-09-28 PROCEDURE — 99233 SBSQ HOSP IP/OBS HIGH 50: CPT | Mod: ,,, | Performed by: NURSE PRACTITIONER

## 2018-09-28 PROCEDURE — 63600175 PHARM REV CODE 636 W HCPCS: Performed by: STUDENT IN AN ORGANIZED HEALTH CARE EDUCATION/TRAINING PROGRAM

## 2018-09-28 PROCEDURE — 84295 ASSAY OF SERUM SODIUM: CPT

## 2018-09-28 PROCEDURE — 83735 ASSAY OF MAGNESIUM: CPT

## 2018-09-28 PROCEDURE — 25000003 PHARM REV CODE 250: Performed by: NURSE PRACTITIONER

## 2018-09-28 PROCEDURE — 80053 COMPREHEN METABOLIC PANEL: CPT

## 2018-09-28 PROCEDURE — 81003 URINALYSIS AUTO W/O SCOPE: CPT

## 2018-09-28 PROCEDURE — 94761 N-INVAS EAR/PLS OXIMETRY MLT: CPT

## 2018-09-28 PROCEDURE — 63600175 PHARM REV CODE 636 W HCPCS: Mod: JG | Performed by: STUDENT IN AN ORGANIZED HEALTH CARE EDUCATION/TRAINING PROGRAM

## 2018-09-28 PROCEDURE — 84100 ASSAY OF PHOSPHORUS: CPT

## 2018-09-28 PROCEDURE — 83930 ASSAY OF BLOOD OSMOLALITY: CPT

## 2018-09-28 PROCEDURE — 82530 CORTISOL FREE: CPT

## 2018-09-28 RX ORDER — POTASSIUM CHLORIDE 20 MEQ/15ML
60 SOLUTION ORAL
Status: DISCONTINUED | OUTPATIENT
Start: 2018-09-28 | End: 2018-09-29 | Stop reason: HOSPADM

## 2018-09-28 RX ORDER — LANOLIN ALCOHOL/MO/W.PET/CERES
800 CREAM (GRAM) TOPICAL
Status: DISCONTINUED | OUTPATIENT
Start: 2018-09-28 | End: 2018-09-29 | Stop reason: HOSPADM

## 2018-09-28 RX ORDER — SODIUM,POTASSIUM PHOSPHATES 280-250MG
2 POWDER IN PACKET (EA) ORAL
Status: DISCONTINUED | OUTPATIENT
Start: 2018-09-28 | End: 2018-09-29 | Stop reason: HOSPADM

## 2018-09-28 RX ORDER — POTASSIUM CHLORIDE 20 MEQ/15ML
40 SOLUTION ORAL
Status: DISCONTINUED | OUTPATIENT
Start: 2018-09-28 | End: 2018-09-29 | Stop reason: HOSPADM

## 2018-09-28 RX ORDER — DESMOPRESSIN ACETATE 4 UG/ML
1 INJECTION, SOLUTION INTRAVENOUS; SUBCUTANEOUS ONCE
Status: COMPLETED | OUTPATIENT
Start: 2018-09-28 | End: 2018-09-28

## 2018-09-28 RX ORDER — LORAZEPAM 0.5 MG/1
0.5 TABLET ORAL NIGHTLY PRN
Status: DISCONTINUED | OUTPATIENT
Start: 2018-09-28 | End: 2018-09-29 | Stop reason: HOSPADM

## 2018-09-28 RX ORDER — BUPROPION HYDROCHLORIDE 150 MG/1
150 TABLET ORAL DAILY
Status: DISCONTINUED | OUTPATIENT
Start: 2018-09-28 | End: 2018-09-29 | Stop reason: HOSPADM

## 2018-09-28 RX ADMIN — ACETAMINOPHEN 650 MG: 325 TABLET, FILM COATED ORAL at 11:09

## 2018-09-28 RX ADMIN — SIMVASTATIN 10 MG: 10 TABLET, FILM COATED ORAL at 08:09

## 2018-09-28 RX ADMIN — FAMOTIDINE 20 MG: 20 TABLET ORAL at 09:09

## 2018-09-28 RX ADMIN — HEPARIN SODIUM 5000 UNITS: 5000 INJECTION, SOLUTION INTRAVENOUS; SUBCUTANEOUS at 05:09

## 2018-09-28 RX ADMIN — DESMOPRESSIN ACETATE 1 MCG: 4 SOLUTION INTRAVENOUS at 06:09

## 2018-09-28 RX ADMIN — POTASSIUM & SODIUM PHOSPHATES POWDER PACK 280-160-250 MG 2 PACKET: 280-160-250 PACK at 06:09

## 2018-09-28 RX ADMIN — FAMOTIDINE 20 MG: 20 TABLET ORAL at 08:09

## 2018-09-28 RX ADMIN — HYDROCORTISONE 20 MG: 5 TABLET ORAL at 09:09

## 2018-09-28 RX ADMIN — ACETAMINOPHEN 650 MG: 325 TABLET, FILM COATED ORAL at 04:09

## 2018-09-28 RX ADMIN — LORAZEPAM 0.5 MG: 0.5 TABLET ORAL at 08:09

## 2018-09-28 RX ADMIN — POTASSIUM CHLORIDE 40 MEQ: 20 SOLUTION ORAL at 09:09

## 2018-09-28 RX ADMIN — BUPROPION HYDROCHLORIDE 150 MG: 150 TABLET, EXTENDED RELEASE ORAL at 10:09

## 2018-09-28 RX ADMIN — ACETAMINOPHEN 650 MG: 325 TABLET, FILM COATED ORAL at 02:09

## 2018-09-28 RX ADMIN — MUPIROCIN 1 G: 20 OINTMENT TOPICAL at 08:09

## 2018-09-28 RX ADMIN — MUPIROCIN 1 G: 20 OINTMENT TOPICAL at 09:09

## 2018-09-28 RX ADMIN — ACETAMINOPHEN 650 MG: 325 TABLET, FILM COATED ORAL at 10:09

## 2018-09-28 RX ADMIN — HYDROCORTISONE 10 MG: 10 TABLET ORAL at 08:09

## 2018-09-28 RX ADMIN — POTASSIUM & SODIUM PHOSPHATES POWDER PACK 280-160-250 MG 2 PACKET: 280-160-250 PACK at 09:09

## 2018-09-28 RX ADMIN — LISINOPRIL 10 MG: 2.5 TABLET ORAL at 09:09

## 2018-09-28 RX ADMIN — LEVOTHYROXINE SODIUM 50 MCG: 50 TABLET ORAL at 05:09

## 2018-09-28 RX ADMIN — HEPARIN SODIUM 5000 UNITS: 5000 INJECTION, SOLUTION INTRAVENOUS; SUBCUTANEOUS at 02:09

## 2018-09-28 RX ADMIN — POTASSIUM & SODIUM PHOSPHATES POWDER PACK 280-160-250 MG 2 PACKET: 280-160-250 PACK at 02:09

## 2018-09-28 RX ADMIN — SENNOSIDES AND DOCUSATE SODIUM 1 TABLET: 8.6; 5 TABLET ORAL at 09:09

## 2018-09-28 NOTE — NURSING
MD ordered 1mcg desmopressin for continued urine output of 350cc/hr. Na lab also showed a significant increase from 09/27.   desmopressin

## 2018-09-28 NOTE — ASSESSMENT & PLAN NOTE
POD#2 s/p transphenoidal resection  - Admitted to [facility] for post-op monitoring  - Hourly neuro checks  - Hourly I/O, monitor for DI  - Neurosurgery following  - Nasal precautions  - packing removed 9/28, serosanguinous drainage noted  - SBP goal 100-160  - Hydrocortisone 20-10  - Daily BMP, more frequent if signs of DI  - Endocrinology following  - will stepdown to NSGY today

## 2018-09-28 NOTE — HPI
Ms. Forrester is a 62-year-old female who was referred to me by Dr. Tayo Glass.  Her past medical history is significant for hypertension, diabetes, hyperlipidemia, stage 3 chronic kidney disease, and posterior vitreous detachment of the left eye.  She states that she was going for her yearly eye exam and on examination her eye doctor found a visual field cut in the right eye which was not consistent with her previous history of vitreous detachment in the left eye.  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  The patient was therefore referred to me for evaluation and treatment.  She states that in retrospect, she does feel that her vision has been off.  She denies blurry vision or double vision but feels that there may be a defect in her peripheral vision.  She denies increased fatigue, unintentional weight gain, easy bruisability, abdominal striae, change in hand or shoe size, increased spacing of her teeth, galactorrhea, irregular menses, increased thirst, or increased urination.

## 2018-09-28 NOTE — PROGRESS NOTES
[facility]  Neurocritical Care  Progress Note    Admit Date: 9/26/2018  Service Date: 09/28/2018  Length of Stay: 2    Subjective:     Chief Complaint: Pituitary tumor    History of Present Illness: Ms. Forrester is a 61 y/o female with pituitary macroadenoma presents to [facility] s/p transphenoidal resection of mass. Mass was found on MRI after incidental finding of R visual deficit during annual eye exam. She was then referred to NSGY who recommended resection to relieve pressure on optic chiasm. Patient will be admitted to [facility] for close post-operative monitoring overnight.     Hospital Course: 9/26: Transphenoidal pituitary mass resection   9/27 hemodynamically stable.removed nasal packing, outer nasal dressing saturated with bloody drainage. Transferred to 7th floor under NSGY service  9/28 Over night Na up 144, ur spec grav 1.00 ,urine output > 500cc DDAVP x1 given this a.m. Responded well urine output decreased. Will continue to follow sodium and sp. Grav. Restarted antianxiety meds.    Interval History: Over night Na up 144, ur spec grav 1.00 ,urine output > 500cc DDAVP x1 given this a.m. Responded well urine output decreased. Will continue to follow sodium and sp. Grav. Restarted antianxiety meds.      Review of Systems:  Constitutional: Denies fevers, weight loss, chills, or weakness.  Eyes: Denies changes in vision.  ENT: + sinus pain,   bloody drainage after packing removed Denies dysphagia, ear pain or discharge.  Cardiovascular: Denies chest pain, palpitations, orthopnea, or claudication.  Respiratory: Denies shortness of breath, cough, hemoptysis, or wheezing.  GI: Denies nausea/vomitting, hematochezia, melena, abd pain, or changes in appetite.  : Denies dysuria, incontinence, or hematuria.  Musculoskeletal: Denies joint pain or myalgias.  Skin/breast: Denies rashes, lumps, lesions, or discharge.  Neurologic: + headache, dizziness, vertigo, or paresthesias.  Psychiatric: Denies changes in  mood or hallucinations.  Endocrine: Denies polyuria, polydipsia, heat/cold intolerance.  Hematologic/Lymph: Denies lymphadenopathy, easy bruising or easy bleeding.  Allergic/Immunologic: Denies rash, rhinitis.         Vitals:   Temp: 98 °F (36.7 °C)  Pulse: 77  Rhythm: normal sinus rhythm  BP: 128/72  MAP (mmHg): 94  Resp: (!) 22  SpO2: 98 %  O2 Device (Oxygen Therapy): room air    Temp  Min: 98 °F (36.7 °C)  Max: 99.5 °F (37.5 °C)  Pulse  Min: 58  Max: 86  BP  Min: 110/58  Max: 155/77  MAP (mmHg)  Min: 80  Max: 110  Resp  Min: 11  Max: 26  SpO2  Min: 96 %  Max: 99 %    09/27 0701 - 09/28 0700  In: 3000 [P.O.:900; I.V.:2100]  Out: 5560 [Urine:5560]         Examination:   Constitutional: Well-nourished and -developed. No apparent distress.   Eyes: Conjunctiva clear, anicteric. Lids no lesions.  Head/Ears/Nose/Mouth/Throat/Neck: Moist mucous membranes. External ears, nose atraumatic.   Cardiovascular: Regular rhythm. No murmurs. No leg edema.  Respiratory: Comfortable respirations. Clear to auscultation.  Gastrointestinal: No hernia. Soft, nondistended, nontender. + bowel sounds.     Neurologic:  -GCS E4V5M6  -Alert. Oriented to person, place, and time. Speech fluent. Follows commands.  -Cranial nerves II-XII intact, particularly PERRL 3+, EOMI. Facial symmetry,  Shoulder shrug equal  -Motor 5/5 and symmetric in arms and legs. Tone normal  -Sensation bilat intact to all extremities    \  Medications:   Continuous Scheduled  buPROPion 150 mg Daily   famotidine 20 mg BID   heparin (porcine) 5,000 Units Q8H   hydrocortisone 10 mg QHS   hydrocortisone 20 mg Daily   levothyroxine 50 mcg Before breakfast   lisinopril 10 mg Daily   mupirocin 1 g BID   senna-docusate 8.6-50 mg 1 tablet Daily   simvastatin 10 mg QHS   PRN  acetaminophen 650 mg Q6H PRN   acetaminophen 650 mg Q6H PRN   dextrose 50% 12.5 g PRN   dextrose 50% 25 g PRN   glucagon (human recombinant) 1 mg PRN   glucose 16 g PRN   glucose 24 g PRN    HYDROcodone-acetaminophen 1 tablet Q4H PRN   HYDROmorphone 0.5 mg Q3H PRN   insulin aspart U-100 0-4 Units PRN   LORazepam 0.5 mg Nightly PRN   magnesium oxide 800 mg PRN   magnesium oxide 800 mg PRN   ondansetron 8 mg Q8H PRN   potassium chloride 10% 40 mEq PRN   potassium chloride 10% 40 mEq PRN   potassium chloride 10% 60 mEq PRN   potassium, sodium phosphates 2 packet PRN   potassium, sodium phosphates 2 packet PRN   potassium, sodium phosphates 2 packet PRN   sodium chloride 0.9% 3 mL PRN      Today I independently reviewed pertinent medications, lines/drains/airways, imaging, lab results, microbiology results, notably:     ISTAT: No results for input(s): PH, PCO2, PO2, POCSATURATED, HCO3, BE, POCNA, POCK, POCTCO2, POCGLU, POCICA, POCLAC, SAMPLE in the last 24 hours.   Chem: Recent Labs   Lab  09/28/18   0251  09/28/18   1221   NA  144  143   K  3.7  4.1   CL  116*   --    CO2  23   --    GLU  134*   --    BUN  11   --    CREATININE  0.9   --    ESTGFRAFRICA  >60.0   --    EGFRNONAA  >60.0   --    CALCIUM  8.4*   --    MG  2.4   --    PHOS  1.6*   --    ANIONGAP  5*   --    PROT  5.9*   --    ALBUMIN  3.2*   --    BILITOT  0.3   --    ALKPHOS  68   --    AST  19   --    ALT  24   --      Heme: Recent Labs   Lab  09/28/18   0251   WBC  11.85   HGB  10.6*   HCT  31.6*   PLT  309     Endo:   Recent Labs   Lab  09/27/18   1803  09/27/18   2139  09/28/18   0539   POCTGLUCOSE  164*  147*  143*      Assessment/Plan:     Psychiatric   Anxiety and depression    wellbutrin ER 150mg daily restarted  Ativan 0.5mg qhsprn started        Cardiac/Vascular   Hyperlipidemia    restarted simvastatin 10mg daily        Hypertension    SBP goal 100-160  - Restart home lisinopril 10mg daily  - PRN IV antihypertensives         Renal/   Chronic kidney disease, stage III (moderate)    - Hourly I/O  uriine output > 500cc for 2h, Sp.grav down 1.00, Na 144=> DDAVP given  Urine output decreased after DDAVP  Monitor Na and Ur sp.  Grav.  - Daily BMP        Oncology   * Pituitary tumor    POD#2 s/p transphenoidal resection  - Admitted to [facility] for post-op monitoring  - Hourly neuro checks  - Hourly I/O, monitor for DI  - Neurosurgery following  - Nasal precautions  - packing removed 9/28, serosanguinous drainage noted  - SBP goal 100-160  - Hydrocortisone 20-10  - Daily BMP, more frequent if signs of DI  - Endocrinology following  - will stepdown to NSGY today          Endocrine   Other specified hypothyroidism    - Continue home levothyroxine 50mcg daily  - Endocrinology following         Type 2 diabetes mellitus without retinopathy    accuchecks ac/hs with SSI  - Hemoglobin A1c 5.4  - Endocrinology following  - Hold home metformin while inpatient             Prophylaxis:  Venous Thromboembolism: mechanical  Stress Ulcer: PPI  Ventilator Pneumonia: not applicable     Activity Orders          None        Prior     I have spent 35 min with this patient, with over 50% of this time spent coordinating care and speaking with the family    Ayla Rome NP  Neurocritical Care  [facility]

## 2018-09-28 NOTE — PLAN OF CARE
Problem: Patient Care Overview  Goal: Plan of Care Review  POC reviewed with pt at 0500. Pt verbalized understanding. Questions and concerns addressed. No acute events overnight. Pt progressing toward goals. Expected to have nasal stents removed today. Will continue to monitor. See flowsheets for full assessment and VS info.

## 2018-09-28 NOTE — ASSESSMENT & PLAN NOTE
- Hourly I/O  uriine output > 500cc for 2h, Sp.grav down 1.00, Na 144=> DDAVP given  Urine output decreased after DDAVP  Monitor Na and Ur sp. Grav.  - Daily BMP

## 2018-09-28 NOTE — PLAN OF CARE
Pituitary tumor  S/p resection    Pt has no h/o adrenal insufficiency or excess.   Currently on Hydrocortisone 20 mg daily and 10 mg, hs.     Pt has h/o mild hypothyroidism.  On PO Levothyroxine 50 mcg, daily at home.     Recommendations:   -Continue current dose of hydrocortisone. If pt becomes unstable with hypotension, would start stress dose steroids (Hydrocortisone 100 mg Q8h).  -Continue PO Levothyroxine 50 mcg, qAM.    Diabetes Insipidus  -most likely transient    Serum Na: 144  Urine output: 250-350 ml/h  Urine specific gravity: 1.000    Pt received desmopressin 1 mcg today morning at 6AM.    -Give desmopressin only if urine output>250cc for x2 consecutive hours with Na >145 and low spec gravity   -Monitor STRICT I/Os    Type 2 Diabetes mellitus without retinopathy    On Metformin 500 mg daily at home     Goal BG while inpatient: 140-180 mg/dl     Recommendations:   -Hold metformin   -POC AC/HS  -Low dose correction

## 2018-09-28 NOTE — SUBJECTIVE & OBJECTIVE
Interval History: exam stable, Na 143. Given DDAVP x1 last night    Medications:  Continuous Infusions:    Scheduled Meds:   buPROPion  150 mg Oral Daily    famotidine  20 mg Oral BID    heparin (porcine)  5,000 Units Subcutaneous Q8H    hydrocortisone  10 mg Oral QHS    hydrocortisone  20 mg Oral Daily    levothyroxine  50 mcg Oral Before breakfast    lisinopril  10 mg Oral Daily    mupirocin  1 g Nasal BID    senna-docusate 8.6-50 mg  1 tablet Oral Daily    simvastatin  10 mg Oral QHS     PRN Meds:acetaminophen, acetaminophen, dextrose 50%, dextrose 50%, glucagon (human recombinant), glucose, glucose, HYDROcodone-acetaminophen, HYDROmorphone, insulin aspart U-100, LORazepam, magnesium oxide, magnesium oxide, ondansetron, potassium chloride 10%, potassium chloride 10%, potassium chloride 10%, potassium, sodium phosphates, potassium, sodium phosphates, potassium, sodium phosphates, sodium chloride 0.9%     Review of Systems    Objective:     Weight: 100.5 kg (221 lb 9 oz)  Body mass index is 36.87 kg/m².  Vital Signs (Most Recent):  Temp: 98.1 °F (36.7 °C) (09/28/18 1502)  Pulse: 76 (09/28/18 1702)  Resp: (!) 21 (09/28/18 1702)  BP: 123/65 (09/28/18 1702)  SpO2: 98 % (09/28/18 1702) Vital Signs (24h Range):  Temp:  [98 °F (36.7 °C)-99.5 °F (37.5 °C)] 98.1 °F (36.7 °C)  Pulse:  [58-86] 76  Resp:  [12-26] 21  SpO2:  [96 %-99 %] 98 %  BP: (104-155)/(56-78) 123/65     Date 09/28/18 0700 - 09/29/18 0659   Shift 6108-4557 0905-4389 1381-4977 24 Hour Total   INTAKE   P.O. 490 310  800   I.V.(mL/kg) 495(4.9)   495(4.9)   Shift Total(mL/kg) 985(9.8) 310(3.1)  1295(12.9)   OUTPUT   Urine(mL/kg/hr) 1080(1.3) 195  1275   Shift Total(mL/kg) 1080(10.7) 195(1.9)  1275(12.7)   Weight (kg) 100.5 100.5 100.5 100.5                        Urethral Catheter 09/26/18 0826 Non-latex;Straight-tip 16 Fr. (Active)   Site Assessment Clean;Intact 9/27/2018  7:02 AM   Collection Container Urimeter 9/27/2018  7:02 AM   Securement  "Method secured to top of thigh w/ adhesive device 9/27/2018  7:02 AM   Catheter Care Performed yes 9/27/2018  7:02 AM   Reason for Continuing Urinary Catheterization Critically ill in ICU requiring intensive monitoring;Post operative 9/27/2018  7:02 AM   CAUTI Prevention Bundle Intact seal between catheter & drainage tubing;StatLock in place w 1" slack;Drainage bag off the floor;Green sheeting clip in use;No dependent loops or kinks;Drainage bag not overfilled (<2/3 full);Drainage bag below bladder 9/27/2018  7:02 AM   Output (mL) 75 mL 9/27/2018  9:02 AM       Neurosurgery Physical Exam    General: AOx3, GCS E4V5M6  CNII-XII: Intact on fine exam, PERRL, EOMi, facial sensation preserved, no facial assymetry, tongue/uvula/palate midline, shoulder shrug equal, No pronator drift  Extremities: 5/5 motor throughout, sensorium intact throughout, coordination intact throughout    Significant Labs:  Recent Labs   Lab  09/27/18   0530  09/27/18   0809  09/27/18   1315  09/27/18   1927  09/28/18   0251  09/28/18   1221   GLU  151*  152*   --    --   134*   --    NA  128*  134*   --   139  144  143   K  3.1*  3.1*  3.8   --   3.7  4.1   CL  102  106   --    --   116*   --    CO2  17*  19*   --    --   23   --    BUN  12  12   --    --   11   --    CREATININE  0.8  0.8   --    --   0.9   --    CALCIUM  8.1*  7.9*   --    --   8.4*   --    MG  1.9   --    --    --   2.4   --      Recent Labs   Lab  09/27/18   0530  09/28/18   0251   WBC  15.55*  11.85   HGB  10.6*  10.6*   HCT  31.6*  31.6*   PLT  313  309     No results for input(s): LABPT, INR, APTT in the last 48 hours.  Microbiology Results (last 7 days)     ** No results found for the last 168 hours. **        ABGs:   No results for input(s): PH, PCO2, PO2, HCO3, POCSATURATED, BE in the last 48 hours.  Cardiac markers: No results for input(s): CKMB, CPKMB, TROPONINT, TROPONINI, MYOGLOBIN in the last 48 hours.  CMP:   Recent Labs   Lab  09/27/18   0530  09/27/18   0809  " 09/27/18   1315  09/27/18   1927  09/28/18   0251  09/28/18   1221   GLU  151*  152*   --    --   134*   --    CALCIUM  8.1*  7.9*   --    --   8.4*   --    ALBUMIN   --   3.0*   --    --   3.2*   --    PROT   --   5.4*   --    --   5.9*   --    NA  128*  134*   --   139  144  143   K  3.1*  3.1*  3.8   --   3.7  4.1   CO2  17*  19*   --    --   23   --    CL  102  106   --    --   116*   --    BUN  12  12   --    --   11   --    CREATININE  0.8  0.8   --    --   0.9   --    ALKPHOS   --   74   --    --   68   --    ALT   --   27   --    --   24   --    AST   --   19   --    --   19   --    BILITOT   --   0.4   --    --   0.3   --      CRP: No results for input(s): CRP in the last 48 hours.  ESR: No results for input(s): POCESR, ERYTHROCYTES in the last 48 hours.  LFTs:   Recent Labs   Lab  09/27/18   0809  09/28/18   0251   ALT  27  24   AST  19  19   ALKPHOS  74  68   BILITOT  0.4  0.3   PROT  5.4*  5.9*   ALBUMIN  3.0*  3.2*     Procalcitonin: No results for input(s): PROCAL in the last 48 hours.    Significant Diagnostics:  I have reviewed and interpreted all pertinent imaging results/findings within the past 24 hours.

## 2018-09-28 NOTE — ASSESSMENT & PLAN NOTE
62F w/ PMH HTN, T2DM, CKD, L vitreous detachment who presents for elective TSR for tumor removal 9/26:    --Patient admitted to ICU, preponderance of medical care per NCC   -Stable for TTF today   --F/U AM serum osm and NA  --F/U cortisol from 9/27  --Strict I/O's  --F/U endocrine consult, appreciate reccs  --Continue hydrocortisone 20/10 QD/QHS  --Nasal pack out today

## 2018-09-28 NOTE — NURSING
NSCCU notified pt. Urine output 350 cc/hr. Also notified about urine specific gravity of 1. MD order serum sodium check. Will continue to monitor.

## 2018-09-28 NOTE — NURSING
NSCCU contacted pt. Urine 350 cc/hr. MD order (UA, Urine sodium, urine Os). Pt also states she takes ativan .5mg at home for axiety MD ordered x1 Ativan .5mg for anxiety tonight.Will continue to monitor

## 2018-09-28 NOTE — PLAN OF CARE
Problem: Patient Care Overview  Goal: Plan of Care Review  Outcome: Revised  POC reviewed with Ms. Andrews and her sister at 1400. Pt verbalized understanding. Questions and concerns addressed. Nasal packing removed today.  HOB restrictions lifted and Pt got up in chair today.   Will continue to monitor. See flowsheets for full assessment and VS info.

## 2018-09-29 VITALS
DIASTOLIC BLOOD PRESSURE: 73 MMHG | OXYGEN SATURATION: 94 % | HEART RATE: 61 BPM | BODY MASS INDEX: 36.91 KG/M2 | SYSTOLIC BLOOD PRESSURE: 139 MMHG | TEMPERATURE: 98 F | RESPIRATION RATE: 18 BRPM | HEIGHT: 65 IN | WEIGHT: 221.56 LBS

## 2018-09-29 LAB
POCT GLUCOSE: 119 MG/DL (ref 70–110)
POCT GLUCOSE: 144 MG/DL (ref 70–110)
POCT GLUCOSE: 91 MG/DL (ref 70–110)

## 2018-09-29 PROCEDURE — 25000003 PHARM REV CODE 250: Performed by: PHYSICIAN ASSISTANT

## 2018-09-29 PROCEDURE — G8979 MOBILITY GOAL STATUS: HCPCS | Mod: CH

## 2018-09-29 PROCEDURE — 25000003 PHARM REV CODE 250: Performed by: NURSE PRACTITIONER

## 2018-09-29 PROCEDURE — G8980 MOBILITY D/C STATUS: HCPCS | Mod: CH

## 2018-09-29 PROCEDURE — 63600175 PHARM REV CODE 636 W HCPCS: Performed by: STUDENT IN AN ORGANIZED HEALTH CARE EDUCATION/TRAINING PROGRAM

## 2018-09-29 PROCEDURE — 25000003 PHARM REV CODE 250: Performed by: STUDENT IN AN ORGANIZED HEALTH CARE EDUCATION/TRAINING PROGRAM

## 2018-09-29 PROCEDURE — G8978 MOBILITY CURRENT STATUS: HCPCS | Mod: CH

## 2018-09-29 PROCEDURE — 97530 THERAPEUTIC ACTIVITIES: CPT

## 2018-09-29 PROCEDURE — 97161 PT EVAL LOW COMPLEX 20 MIN: CPT

## 2018-09-29 RX ORDER — HYDROCORTISONE 10 MG/1
10 TABLET ORAL DAILY
Qty: 30 TABLET | Refills: 3 | Status: SHIPPED | OUTPATIENT
Start: 2018-09-29 | End: 2018-10-29

## 2018-09-29 RX ORDER — HYDROCORTISONE 10 MG/1
10 TABLET ORAL DAILY
Qty: 30 TABLET | Refills: 0 | Status: SHIPPED | OUTPATIENT
Start: 2018-09-29 | End: 2018-09-29 | Stop reason: SDUPTHER

## 2018-09-29 RX ORDER — HYDROCORTISONE 5 MG/1
5 TABLET ORAL NIGHTLY
Qty: 30 TABLET | Refills: 0 | Status: SHIPPED | OUTPATIENT
Start: 2018-09-29 | End: 2018-09-29 | Stop reason: SDUPTHER

## 2018-09-29 RX ORDER — HYDROCODONE BITARTRATE AND ACETAMINOPHEN 5; 325 MG/1; MG/1
1 TABLET ORAL EVERY 6 HOURS PRN
Qty: 20 TABLET | Refills: 0 | Status: SHIPPED | OUTPATIENT
Start: 2018-09-29 | End: 2018-10-11

## 2018-09-29 RX ORDER — HYDROCORTISONE 5 MG/1
5 TABLET ORAL NIGHTLY
Qty: 30 TABLET | Refills: 3 | Status: SHIPPED | OUTPATIENT
Start: 2018-09-29 | End: 2018-10-09

## 2018-09-29 RX ADMIN — LISINOPRIL 10 MG: 2.5 TABLET ORAL at 08:09

## 2018-09-29 RX ADMIN — HEPARIN SODIUM 5000 UNITS: 5000 INJECTION, SOLUTION INTRAVENOUS; SUBCUTANEOUS at 05:09

## 2018-09-29 RX ADMIN — BUPROPION HYDROCHLORIDE 150 MG: 150 TABLET, EXTENDED RELEASE ORAL at 08:09

## 2018-09-29 RX ADMIN — MUPIROCIN 1 G: 20 OINTMENT TOPICAL at 09:09

## 2018-09-29 RX ADMIN — LEVOTHYROXINE SODIUM 50 MCG: 50 TABLET ORAL at 05:09

## 2018-09-29 RX ADMIN — FAMOTIDINE 20 MG: 20 TABLET ORAL at 08:09

## 2018-09-29 RX ADMIN — SENNOSIDES AND DOCUSATE SODIUM 1 TABLET: 8.6; 5 TABLET ORAL at 08:09

## 2018-09-29 RX ADMIN — ACETAMINOPHEN 650 MG: 325 TABLET, FILM COATED ORAL at 06:09

## 2018-09-29 RX ADMIN — HYDROCORTISONE 20 MG: 5 TABLET ORAL at 08:09

## 2018-09-29 NOTE — PT/OT/SLP EVAL
"Physical Therapy Evaluation and Discharge Note    Patient Name:  Juliana Forrester   MRN:  [id number]    Recommendations:     Discharge Recommendations:  home   Discharge Equipment Recommendations: none   Barriers to discharge: None    Assessment:     Juliana Forrester is a 62 y.o. female admitted with a medical diagnosis of Pituitary tumor. .  At this time, patient is functioning at their prior level of function and does not require further acute PT services.     Recent Surgery: Procedure(s) (LRB):  RESECTION, NEOPLASM, PITUITARY, TRANSSPHENOIDAL APPROACH (N/A) 3 Days Post-Op    Plan:     During this hospitalization, patient does not require further acute PT services.  Please re-consult if situation changes.      Subjective     Chief Complaint: no complaints  Patient/Family Comments/goals: "I might be able to leave after you see me" per pt during session  Pain/Comfort:  · Pain Rating 1: 0/10    Patients cultural, spiritual, Anabaptism conflicts given the current situation:      Living Environment:  Pt lives alone in one story shotgun style home with 3 OSMAR and (B) HR. Friend will stay with pt upon discharge. Retired speech therapist; enjoys gardening and being with friends. (I) with mobility and self care PTA. Owns no DME    Objective:     Communicated with nsg prior to session.  Patient found supine in bed upon PT entry to room found with:       General Precautions: Standard, fall   Orthopedic Precautions:N/A   Braces: N/A       Exams:  Cognitive Exam  Patient is oriented to Person, Place, Time and Situation and follows 100% of multi-step commands    Fine Motor Coordination    -       Intact   Postural Exam Patient presented with the following abnormalities:    -       Rounded shoulders  -       Forward head   Sensation    -       Intact   Skin Integrity/Edema   -       Skin integrity: Visible skin intact, Bruising of forearm   R LE ROM WFL   R LE Strength  4/5 hip flexion/knee ext/flex, and ankle DF   L LE ROM WFL   L LE " Strength  4/5 hip flexion/knee ext/flex, and ankle DF       Functional Mobility  Bed Mobility  Supine to Sit: supervision   Sit to Supine: supervision   Transfers Sit to Stand:  supervision with no AD for 2 trials     Gait Gait Distance: 150 ft with no AD  Assistance Level: stand by assistance  Description: slowed gunjan with slight narrow base of support. Cued to widen base to decr instability. Minimal instability denoted with no LOB demonstrated.          Balance   Static Sitting supervision   Dynamic Sitting supervision   Static Standing supervision   Multiple higher level balance performed  - tandem stance  - feet together with eyes closed  - postural perturbations  - forward reach with TR  - downward reach with MS  - unilateral stance (slight LOB)   Dynamic Standing stand by assistance           AM-PAC 6 CLICK MOBILITY  Total Score:24       Therapeutic Activities and Exercises:   PT educated pt on the following  - role of PT  - PT POC (including frequency and duration while in hospital)  - discharge recommendation (none) and equipment needs (none)  - level of assistance currently req (1 person)and safety precautions with nsg staff   All questions and concerns answered and addressed. White board updated with pertinent information. Nsg notified.       AM-PAC 6 CLICK MOBILITY  Total Score:24     Patient left seated edge of bed with all lines intact and call button in reach.    GOALS:   Multidisciplinary Problems     Physical Therapy Goals     Not on file          Multidisciplinary Problems (Resolved)        Problem: Physical Therapy Goal    Goal Priority Disciplines Outcome Goal Variances Interventions   Physical Therapy Goal   (Resolved)     PT, PT/OT Outcome(s) achieved                     History:     Past Medical History:   Diagnosis Date    Cataract     Hypertension     Pituitary adenoma 9/12/2018       Past Surgical History:   Procedure Laterality Date    CHOLECYSTECTOMY  2016    RESECTION, NEOPLASM,  PITUITARY, TRANSSPHENOIDAL APPROACH N/A 9/26/2018    Performed by Racquel Mcadams MD at [facility] OR [address] [place] [address]    SURGICAL REMOVAL OF PITUITARY TUMOR BY TRANSSPHENOIDAL APPROACH N/A 9/26/2018    Procedure: RESECTION, NEOPLASM, PITUITARY, TRANSSPHENOIDAL APPROACH;  Surgeon: Racquel Mcadams MD;  Location: [facility] OR [address] [place] [address];  Service: Neurosurgery;  Laterality: N/A;  WITH POSSIBLE ABDOMINAL FAT GRAFT/1.5HOUR/23HOUR STAY/TYPE/HOLD 2 UNITS/C-ARM/SUPINE/CO-SURGEON DR. LORNA GALEANA       Clinical Decision Making:     History  Co-morbidities and personal factors that may impact the plan of care Examination  Body Structures and Functions, activity limitations and participation restrictions that may impact the plan of care Clinical Presentation   Decision Making/ Complexity Score   Co-morbidities:   [] Time since onset of injury / illness / exacerbation  [x] Status of current condition  []Patient's cognitive status and safety concerns    [] Multiple Medical Problems (see med hx)  Personal Factors:   [] Patient's age  [x] Prior Level of function   [] Patient's home situation (environment and family support)  [] Patient's level of motivation  [] Expected progression of patient      HISTORY:(criteria)    [] 39476 - no personal factors/history    [x] 35188 - has 1-2 personal factor/comorbidity     [] 79590 - has >3 personal factor/comorbidity     Body Regions:  [] Objective examination findings  [] Head     []  Neck  [] Trunk   [] Upper Extremity  [x] Lower Extremity    Body Systems:  [] For communication ability, affect, cognition, language, and learning style: the assessment of the ability to make needs known, consciousness, orientation (person, place, and time), expected emotional /behavioral responses, and learning preferences (eg, learning barriers, education  needs)  [x] For the neuromuscular system: a general assessment of gross coordinated movement (eg, balance, gait, locomotion, transfers, and transitions) and motor function  (motor  control and motor learning)  [] For the musculoskeletal system: the assessment of gross symmetry, gross range of motion, gross strength, height, and weight  [] For the integumentary system: the assessment of pliability(texture), presence of scar formation, skin color, and skin integrity  [] For cardiovascular/pulmonary system: the assessment of heart rate, respiratory rate, blood pressure, and edema     Activity limitations:    [] Patient's cognitive status and saf ety concerns          [x] Status of current condition      [] Weight bearing restriction  [] Cardiopulmunary Restriction    Participation Restrictions:   [] Goals and goal agreement with the patient     [] Rehab potential (prognosis) and probable outcome      Examination of Body System: (criteria)    [x] 54252 - addressing 1-2 elements    [] 76718 - addressing a total of 3 or more elements     [] 76874 -  Addressing a total of 4 or more elements         Clinical Presentation: (criteria)  Stable - 19626     On examination of body system using standardized tests and measures patient presents with 3 or more elements from any of the following: body structures and functions, activity limitations, and/or participation restrictions.  Leading to a clinical presentation that is considered stable and/or uncomplicated                              Clinical Decision Making  (Eval Complexity):  Low- 75689     Time Tracking:     PT Received On: 09/29/18  PT Start Time: 1006     PT Stop Time: 1028  PT Total Time (min): 22 min     Billable Minutes: Evaluation 14 and Therapeutic Activity 8      Heike Roman, PT, DPT  09/29/2018

## 2018-09-29 NOTE — PLAN OF CARE
Pituitary tumor  S/p resection     Pt has no h/o adrenal insufficiency or excess.   Currently on Hydrocortisone 20 mg daily and 10 mg, hs.     Pt has h/o mild hypothyroidism.  On PO Levothyroxine 50 mcg, daily at home.     Recommendations:   -Continue current dose of hydrocortisone. If pt becomes unstable with hypotension, would start stress dose steroids (Hydrocortisone 100 mg Q8h).  -Continue PO Levothyroxine 50 mcg, qAM.     Diabetes Insipidus  -most likely transient      -Give desmopressin only if urine output>250cc for x2 consecutive hours with Na >145 and low spec gravity   -Monitor STRICT I/Os     Type 2 Diabetes mellitus without retinopathy     On Metformin 500 mg daily at home     Goal BG while inpatient: 140-180 mg/dl     Recommendations:   -Hold metformin   -POC AC/HS  -Low dose correction

## 2018-09-29 NOTE — PLAN OF CARE
Problem: Patient Care Overview  Goal: Plan of Care Review  Outcome: Ongoing (interventions implemented as appropriate)  Patient transferred last night. Medicated w/PRN Tylenol for mild headache w/complete relief. [place] to be D/C in the AM. No other acute events or changes overnight.     Problem: Fall Risk (Adult)  Goal: Identify Related Risk Factors and Signs and Symptoms  Related risk factors and signs and symptoms are identified upon initiation of Human Response Clinical Practice Guideline (CPG)  Outcome: Ongoing (interventions implemented as appropriate)  Patient remains free from falls.

## 2018-09-29 NOTE — DISCHARGE SUMMARY
[facility]  Neurosurgery  Discharge Summary      Patient Name: Juliana Forrester  MRN: [id number]  Admission Date: 9/26/2018  Hospital Length of Stay: 3 days  Discharge Date and Time:  09/29/2018 1:49 PM  Attending Physician: Racquel Mcadams MD   Discharging Provider: Micheal Rowley MD  Primary Care Provider: Maribel Pratt MD    HPI:   Ms. Forrester is a 62-year-old female who was referred to me by Dr. Tayo Glass.  Her past medical history is significant for hypertension, diabetes, hyperlipidemia, stage 3 chronic kidney disease, and posterior vitreous detachment of the left eye.  She states that she was going for her yearly eye exam and on examination her eye doctor found a visual field cut in the right eye which was not consistent with her previous history of vitreous detachment in the left eye.  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  The patient was therefore referred to me for evaluation and treatment.  She states that in retrospect, she does feel that her vision has been off.  She denies blurry vision or double vision but feels that there may be a defect in her peripheral vision.  She denies increased fatigue, unintentional weight gain, easy bruisability, abdominal striae, change in hand or shoe size, increased spacing of her teeth, galactorrhea, irregular menses, increased thirst, or increased urination.        Procedure(s) (LRB):  RESECTION, NEOPLASM, PITUITARY, TRANSSPHENOIDAL APPROACH (N/A)     Hospital Course: 9/26: TSR for adenoma  9/27: Patient doing well this AM, NAEON, exam stable, AFVSS, Na 128 today (141 yesterday), will recheck lab and likely TTF if entered in error  9/28: given DDAVP once, stable Na since. Stable neuro exam  9/29: UOP stable and Na stable, Patient stable for discharge with 6w follow-up with Dr. Mcadams    Consults:   Consults (From admission, onward)        Status Ordering Provider     Inpatient  consult to Endocrinology  Once     Provider:  (Not yet assigned)    Completed KATERINA ARIAS          Significant Diagnostic Studies: All significant diagnostics reviewed prior to discharge    Pending Diagnostic Studies:     Procedure Component Value Units Date/Time    Cortisol, free, serum [[id number]] Collected:  09/28/18 0251    Order Status:  Sent Lab Status:  In process Updated:  09/28/18 0311    Specimen:  Blood     Cortisol, free, serum [[id number]] Collected:  09/27/18 0530    Order Status:  Sent Lab Status:  In process Updated:  09/27/18 0539    Specimen:  Blood         Final Active Diagnoses:    Diagnosis Date Noted POA    PRINCIPAL PROBLEM:  Pituitary tumor [D49.7] 09/26/2018 Yes    Anxiety and depression [F41.9, F32.9] 09/28/2018 Unknown    Other specified hypothyroidism [E03.8] 09/26/2018 Yes    Hypertension [I10] 04/23/2018 Yes    Hyperlipidemia [E78.5] 04/23/2018 Yes    Chronic kidney disease, stage III (moderate) [N18.3] 08/17/2017 Yes    Type 2 diabetes mellitus without retinopathy [E11.9] 07/24/2017 Yes      Problems Resolved During this Admission:      Discharged Condition: good    Disposition:     Follow Up:  Follow-up Information     Racquel Mcadams MD In 6 weeks.    Specialty:  Neurosurgery  Why:  For post-op eval, no imaging required  Contact information:  [address] [place] [id number]  [phone]                 Patient Instructions:      COMPREHENSIVE METABOLIC PANEL   Standing Status: Future Standing Exp. Date: 10/29/18   Order Comments: Please send results to Dr. Mcadams for review     Medications:  Reconciled Home Medications:      Medication List      START taking these medications    HYDROcodone-acetaminophen 5-325 mg per tablet  Commonly known as:  NORCO  Take 1 tablet by mouth every 6 (six) hours as needed for Pain.     * hydrocortisone 10 MG Tab  Commonly known as:  CORTEF  Take 1 tablet (10 mg total) by mouth once daily.     * hydrocortisone 5 MG Tab  Commonly known  as:  CORTEF  Take 1 tablet (5 mg total) by mouth every evening. for 10 days         * This list has 2 medication(s) that are the same as other medications prescribed for you. Read the directions carefully, and ask your doctor or other care provider to review them with you.            CONTINUE taking these medications    levothyroxine 50 MCG tablet  Commonly known as:  SYNTHROID  Take 1 tablet (50 mcg total) by mouth once daily.     lisinopril 10 MG tablet  Take 10 mg by mouth once daily.     metFORMIN 500 MG 24 hr tablet  Commonly known as:  GLUCOPHAGE-XR  Take 500 mg by mouth.     multivitamin capsule  Take 1 capsule by mouth.     simvastatin 10 MG tablet  Commonly known as:  ZOCOR  Take 10 mg by mouth every evening.            Micheal Rowley MD  Neurosurgery  [facility]

## 2018-09-29 NOTE — DISCHARGE INSTRUCTIONS
Pituitary Gland Surgery    Pituitary gland surgery is done to remove a growth (tumor) that forms in the pituitary gland. This gland is at the base of the brain, behind your nose and sinuses. It makes hormones that control growth, sexual function, and fluid balance. It also controls other glands in your body, such as the thyroid and adrenal glands.  Problems can occur when a tumor forms in the pituitary gland. These tumors are almost always benign (not cancer). But a tumor can cause the pituitary to make too much or too little of some hormones. This can lead to health problems. Or a tumor may press on the nerves to the eyes (optic nerves). This can cause headaches and vision loss.  Surgery to remove the tumor can relieve such problems. In most cases, the surgery is done through the nasal passages. This leaves no scars or stitches that can be seen.  Getting ready for your surgery  Follow any instructions from your healthcare provider.  Tell your provider about any medicines you are taking. You may need to stop taking all or some of these before the surgery. This includes:  · All prescription medicines  · Over-the-counter medicines such as aspirin or ibuprofen  · Street drugs  · Herbs, vitamins, and other supplements  Follow any directions from your provider about not eating or drinking before surgery.  The day of surgery  The surgery takes about 3 hours. Before the surgery begins:  · An IV (intravenous) line is put into a vein in your arm or hand. You will receive fluids and medicines through the IV.  · To keep you free of pain during the surgery, youre given general anesthesia. This medicine puts you into a state like a deep sleep through the surgery.  · Just before the surgery, a breathing tube (endotracheal tube) is placed into your throat through your mouth. The tube is usually removed at the end of surgery or soon afterward.  During surgery  In most cases, surgery is done through the sphenoid sinus. This is  one of the air spaces behind the nose. For large or complicated pituitary tumors, a craniotomy is done with the approach through the skull. During trans-sphenoid surgery:  · The surgeon may use a surgical microscope to get a close-up view of the gland. In this case, a small cut (incision) is made in your nose or lip. This makes it possible to enter the sphenoid sinus and reach the tumor. Or, a thin tube (endoscope) may be used. This tube has a light and tiny camera on one end. Its put in through your nostril and into the sphenoid space to reach the tumor.  · Once the tumor has been reached, the surgeon will remove it. If the whole tumor cant be reached, the part that is left may need to be removed during a later surgery. Or it may be treated with radiation. If a large tumor is removed, the area may be filled with a small piece of fat taken from your belly (abdomen).  · At the end of surgery, any incisions are closed with stitches. These dissolve on their own in a few weeks. A spongy material (packing) and splints may be placed in your nose.  Recovering in the hospital  You will be taken to a recovery room to wake up from the anesthesia. Right after surgery, you will have a dressing taped to your nose to absorb drainage. You will be given pain medicine. Tell your provider if you are still in pain. You are then taken to a hospital room to stay for 1 to 2 nights. During your stay:  · For the first few days or weeks, you will have a stuffy nose and a headache.  · You may be given a steroid medicine. (This is not the same as anabolic steroids used by some athletes.) This medicine helps control hormone levels. These levels can change as your body adjusts to your surgery. You will slowly take less and less of the steroid over a week.  · For the first day or two after surgery, your urine output will be watched closely. This is to check for a condition called diabetes insipidus. This is a common condition after pituitary  gland surgery. It makes you feel thirsty and urinate more than normal. It usually goes away on its own within a week or so.  Recovering at home  Once you are ready to go home, you will be released to an adult family member or friend. Plan to have someone stay with you for the next couple of days to help care for you as your healing begins. Be sure to follow all of your healthcare providers instructions. During your recovery:  · Take all medicines as directed. This includes pain medicines, steroids, and other medicines you are prescribed. Do not stop taking steroids without talking to your provider first. Steroids should not be stopped suddenly.  · Use a salt nasal spray as directed. This keeps the lining of your nose moist and aids healing.  · Avoid sneezing, coughing, or blowing your nose for at least 2 weeks after surgery. If you cant avoid sneezing, keep your mouth open.  · Avoid bending, lifting, or straining for 4 weeks after your surgery.  · Go back to your normal activities as advised. Try to do a little more each day.     When to call your healthcare provider  Call your provider if you have any of the following:  · Chest pain or trouble breathing (call 911 or your local emergency number)  · Fever of 100.4°F (38°C) or higher, or as directed by your healthcare provider  · Symptoms of infection at the incision. These include increased redness or swelling, warmth, more pain, or bad-smelling drainage.  · A clear watery drainage from your nose or down the back of your throat  · Headaches or other pain not relieved by medicine  · Dizziness, stiff neck, or sensitivity to light  · Loss of vision or double vision  · Constant thirst that is not relieved by drinking  · More frequent passing of urine than before surgery  · Lightheadedness or fainting  · Pain or swelling in your legs   Follow-up  During follow-up visits, your healthcare provider will check on your healing:  · If you have packing or splints that need to  be removed, this may be done about 7 days after surgery.  · If some of the tumor was not removed, you may need more surgery or treatment. You will discuss this with your provider.  · About 3 months after your surgery, an eye test and MRI (magnetic resonance imaging) may be done. You will have more imaging tests over time. You will also have blood tests to check your hormone levels. These tests make sure that the tumor has not come back.  · Some hormone levels may not return to normal after surgery. If so, you may be given medicines to control or supplement those hormones. Lifelong checking of the gland and your hormone levels is often needed after surgery. Have regular checkups as advised.  Risks and possible complications  All procedures have some risk. Risks of pituitary gland surgery include:  · Leaking of the fluid that surrounds the brain and spinal cord (cerebrospinal fluid)  · Damage to the pituitary gland  · Diabetes insipidus  · Infection  · Bleeding or blood clot in the brain  · Scarring inside the nose  · Vision loss  · Not able to smell or taste  · Injury to blood vessels  · Risks of anesthesia   Date Last Reviewed: 5/1/2017  © 7749-9877 [employer]. [address] [place] [address]. All rights reserved. This information is not intended as a substitute for professional medical care. Always follow your healthcare professional's instructions.

## 2018-09-30 ENCOUNTER — NURSE TRIAGE (OUTPATIENT)
Dept: ADMINISTRATIVE | Facility: CLINIC | Age: 63
End: 2018-09-30

## 2018-09-30 LAB
BLD PROD TYP BPU: NORMAL
BLD PROD TYP BPU: NORMAL
BLOOD UNIT EXPIRATION DATE: NORMAL
BLOOD UNIT EXPIRATION DATE: NORMAL
BLOOD UNIT TYPE CODE: 6200
BLOOD UNIT TYPE CODE: 6200
BLOOD UNIT TYPE: NORMAL
BLOOD UNIT TYPE: NORMAL
CODING SYSTEM: NORMAL
CODING SYSTEM: NORMAL
DISPENSE STATUS: NORMAL
DISPENSE STATUS: NORMAL
TRANS ERYTHROCYTES VOL PATIENT: NORMAL ML
TRANS ERYTHROCYTES VOL PATIENT: NORMAL ML

## 2018-09-30 NOTE — TELEPHONE ENCOUNTER
Can pt resume daily Wellbutrin 150 mg  Ativan 0.5 mg once/daily as needed??    She states it was not in her discharge medication list but she was told by doctor in the hospital it was going to be resumed on discharge.    Reason for Disposition   Caller requesting a NON-URGENT new prescription or refill and triager unable to refill per unit policy    Protocols used: ST MEDICATION QUESTION CALL-A-AH

## 2018-10-02 ENCOUNTER — NURSE TRIAGE (OUTPATIENT)
Dept: ADMINISTRATIVE | Facility: CLINIC | Age: 63
End: 2018-10-02

## 2018-10-02 LAB — CORTIS F SERPL-MCNC: 6.77 MCG/DL

## 2018-10-02 NOTE — OP NOTE
DATE OF PROCEDURE: 9/26/2018     PREOPERATIVE DIAGNOSES:   Pituitary macroadenoma.    POSTOPERATIVE DIAGNOSES:   Pituitary macroadenoma.    PROCEDURES PERFORMED:   Transnasal, transsphenoidal approach for resection of   pituitary macroadenoma with.     Surgeon(s) and Role:     * Racquel Mcadams MD - Primary     * Rambo Campos MD - co-surgeon    Assistant:     * Micheal Rowley MD - assisting    To staff neurosurgeons were necessary for this case due to the fact that   the resident is cyst was a [facility] resident who was not able to   meaningfully assist in this case.  Furthermore, this was a complex tumor   with invasion into the left cavernous sinus us raising the potential for   significant intraoperative bleeding.    ANESTHESIA: General    ESTIMATED BLOOD LOSS: 100 mL.    INDICATION FOR PROCEDURE: Juliana Forrester is a 62 y.o. female who   was found to have a visual field cut in her right eye on her yearly visual exam.    An MRI with and without contrast of the brain was subsequently done which   showed a large pituitary macroadenoma with invasion into the left cavernous   sinus as well as compression of the optic chiasm.  Given her clinical presentation   and radiographic findings, the decision was made to take the patient to the operating   room for a transnasal, transsphenoidal resection of the pituitary adenoma and   decompression of the optic chiasm.      OPERATIVE NOTE: After obtaining informed consent, the patient was brought   into the Operating Room. she was intubated and anesthetized by Anesthesia.   Preoperative antibiotics as well as dexamethasone were administered. The   patient was placed supine on the operating room table with her head tilted   slightly to the left. Fluoroscopy was then brought into the field to confirm   our trajectory to the sellar floor. The patient's nose as well as abdomen were   prepped and draped in the standard sterile fashion. The abdomen was prepped   for potential fat  graft. The operating microscope was immediately brought into   the field. A handheld speculum was used to inspect the right naris. The middle   turbinate was identified and just medial to the level of the middle turbinate,   1% lidocaine with epinephrine was injected into the mucosa in order to elevate   the mucosa from the nasal septum. A #15 blade was then used to incise the nasal   mucosa. This was dissected from the nasal septum on both sides. The handheld   speculum was then placed inside the flap on both sides of the septum. The   septum was taken down using the Eduardo rongeur. The handheld speculum was   replaced with the self-retaining nasal speculum. The sphenoid ostia were identified   and the vomer was removed. The speculum was then advanced just inside the   sphenoid sinus. Again, fluoroscopy was taken to ensure correct trajectory towards   the sellar floor. Once inside the sphenoid sinus, we identified the sphenoid mucosa   and the sellar floor. The sellar floor was probed and was found to be significantly   thin. We identified dura and a small nerve hook was used to separate the dura from   the sellar floor. A #2 Kerrison was used to remove the sellar floor. A #15 blade   was used to incise the dura in an X-shaped fashion. Right after opening the dura,   some tumor was immediately expressed under medium pressure. The tumor was   soft and easily suckable. Ring curettes were then used to probe the sellar and   suprasellar space in order to resect the pituitary adenoma. A series of ring curettes   were used in all directions, first starting in both directions laterally and then   downwards to resect the tumor. We then turned our attention superiorly. We were   able to resect many large pieces of tumor and eventually the arachnoid bowed   down into our field. We continued working with the ring curettes around the   arachnoid. Finally, when our passes with the ring curette no longer yielded pituitary    tumor, we felt we had a good resection of the pituitary macroadenoma. We could   see the sellar floor. Since we were happy with our resection, we turned our attention   to hemostasis. This was obtained using FloSeal and bipolar electrocautery. The   cavity was inspected for any evidence of CSF leak. No overt hole was identified   and there was no CSF seen leaking around the arachnoid. Therefore, we did not   take a piece of abdominal fat graft. A piece of Surgicel was laid down over the   sellar opening. This was followed by a piece of Gelfoam. The speculum was   removed. The mucosal flap was brought back towards midline. A nasal Merocel   packing was placed in the right naris. The left naris was inspected. There was no   mucosal tear and no significant oozing. We did not leave a pack in the left naris.   A sterile dressing was put in place underneath the nose. The patient was   extubated by Anesthesia and brought to the Recovery Room in stable condition.   All counts were correct at the end of the case.

## 2018-10-03 ENCOUNTER — LAB VISIT (OUTPATIENT)
Dept: LAB | Facility: HOSPITAL | Age: 63
End: 2018-10-03
Attending: STUDENT IN AN ORGANIZED HEALTH CARE EDUCATION/TRAINING PROGRAM
Payer: COMMERCIAL

## 2018-10-03 DIAGNOSIS — D49.7 PITUITARY TUMOR: ICD-10-CM

## 2018-10-03 LAB
ALBUMIN SERPL BCP-MCNC: 4.2 G/DL
ALP SERPL-CCNC: 66 U/L
ALT SERPL W/O P-5'-P-CCNC: 34 U/L
ANION GAP SERPL CALC-SCNC: 8 MMOL/L
AST SERPL-CCNC: 26 U/L
BILIRUB SERPL-MCNC: 0.4 MG/DL
BUN SERPL-MCNC: 23 MG/DL
CALCIUM SERPL-MCNC: 10.7 MG/DL
CHLORIDE SERPL-SCNC: 105 MMOL/L
CO2 SERPL-SCNC: 29 MMOL/L
CREAT SERPL-MCNC: 1.2 MG/DL
EST. GFR  (AFRICAN AMERICAN): 56 ML/MIN/1.73 M^2
EST. GFR  (NON AFRICAN AMERICAN): 48 ML/MIN/1.73 M^2
GLUCOSE SERPL-MCNC: 109 MG/DL
POTASSIUM SERPL-SCNC: 4.6 MMOL/L
PROT SERPL-MCNC: 7.6 G/DL
SODIUM SERPL-SCNC: 142 MMOL/L

## 2018-10-03 PROCEDURE — 36415 COLL VENOUS BLD VENIPUNCTURE: CPT

## 2018-10-03 PROCEDURE — 80053 COMPREHEN METABOLIC PANEL: CPT

## 2018-10-03 NOTE — TELEPHONE ENCOUNTER
Sister calling for pt who had a tumor removed from her pituitary gland . Since surgery has had some mild bleeding from nose which was expected. Now c/o of intermittent episodes of blood dripping down throat -knows it is blood because her tongue is red. Wanted to know if this is normal.  Reason for Disposition   Nursing judgment    Protocols used: ST NO GUIDELINE OR REFERENCE [id number]

## 2018-10-05 LAB — CORTIS F SERPL-MCNC: 1.68 MCG/DL

## 2018-10-10 NOTE — OP NOTE
DATE OF PROCEDURE:  09/26/2018    ATTENDING PHYSICIAN:  Racquel Mcadams M.D.    PREOPERATIVE DIAGNOSIS:  Pituitary adenoma.    POSTOPERATIVE DIAGNOSIS:  Pituitary adenoma.    PROCEDURE:  Transsphenoidal resection of pituitary adenoma.    SURGEON:  Rambo Campos M.D.    CO-SURGEON:  Racquel Mcadams M.D.    ASSISTANT:  Micheal Rowley M.D. ([facility] Resident Neurosurgery).    ANESTHESIA:  General endotracheal.    ESTIMATED BLOOD LOSS:  100 mL.    CONDITION AT THE END OF PROCEDURE:  Stable.    BRIEF HISTORY:  This 62-year-old lady with a history of diabetes has been   followed in [facility] in [place] for glaucoma.  On a recent   examination, she was found to have a partial right temporal quadrantanopsia.    This led to MRI of the brain and orbits being done showing a large pituitary   adenoma invading the left cavernous sinus, but extending somewhat more to the   right side above the sella and compressing the optic chiasm.  Her pituitary   hormonal status had been stable.  She was then brought to the Operating Room for   transsphenoidal resection of the tumor.    PROCEDURE IN DETAIL:  In supine position on the operating table under   premedication, the patient was intubated and induced with general anesthesia.  A   Loredo catheter was inserted.  Sequential compression devices applied to the   legs.  A cannula placed in the left radial artery for continuous blood pressure   monitoring and various intravenous lines started.  The operating table was   rotated away from anesthesia.  The head was turned slightly to the left and held   to the table with a tape.  The C-arm fluoroscope was brought into position to   give a lateral view of the sella.  The nasal area was prepped with Betadine and   the right side of the abdomen also prepped with Betadine, should an eventual fat   graft be needed.  She was then draped in a sterile fashion.  The nasal mucosa   was injected with 1% Xylocaine and epinephrine.  The speculum was  then inserted   into the right nostril passing over the middle turbinate and additional 1%   Xylocaine was injected to help elevate the mucosa from the nasal septum.  The   mucosa was then cut along the septum and  with the speculum to expose   the posterior nasal septum coming back to the sphenoid crest.  With the soft   tissue dissected, the posterior portion of the nasal septum was removed down to   the vomer and the self-retaining retractor placed over this.  The sphenoid sinus   was opened with a Eduardo bone rongeur and the nasal mucosa removed back   toward the floor of the sella.  The C-arm fluoroscope was used to maintain the   trajectory and come into the middle of the sella.  The floor of the sella was   thin and could be removed with a nerve hook and then opened with the Kerrison   rongeur to expose the sellar dura.  The dura was coagulated, opened with a   cruciate incision.  Immediately, pinkish pituitary tumor was visible.  Various   size and various angled ring curettes were then used to gradually bring the   tumor away and out.  A specimen was sent to the laboratory and was consistent   with pituitary adenoma.  Knowing that the tumor had completely filled the left   cavernous sinus, attention was kept primarily to the right side, and as the   tumor was brought out, the diaphragma sella gradually came down and was   pulsatile.  Care was taken to make sure good decompression had been achieved.    The pituitary gland seemed to be pushed back to the right.  After irrigation and   bleeding controlled with bipolar coagulation, Surgicel and Surgiflo, there was   no evidence for CSF leakage.  The diaphragma sella was pulsatile.  The tumor   seemed very well resected.  Surgicel was placed over the opening and Gelfoam   placed into the sphenoid sinus.  The nasal mucosa was pushed back against the   septum and held with Merocel packing.  The left nostril was irrigated and   appeared clean.  There  was no apparent need for an abdominal fat graft.  A 4 x 4   was placed under the nose.  The patient was brought back to Anesthesia,   extubated and left the Operating Room in satisfactory condition.            RDS/IN  dd: 10/09/2018 15:29:29 (CDT)  td: 10/09/2018 19:02:59 (CDT)  Doc ID   #[id number]  Job ID #[id number]    CC:

## 2018-10-11 ENCOUNTER — TELEPHONE (OUTPATIENT)
Dept: NEUROSURGERY | Facility: CLINIC | Age: 63
End: 2018-10-11

## 2018-10-11 ENCOUNTER — CLINICAL SUPPORT (OUTPATIENT)
Dept: NEUROSURGERY | Facility: CLINIC | Age: 63
End: 2018-10-11
Payer: COMMERCIAL

## 2018-10-11 VITALS
DIASTOLIC BLOOD PRESSURE: 73 MMHG | WEIGHT: 212.31 LBS | BODY MASS INDEX: 35.33 KG/M2 | SYSTOLIC BLOOD PRESSURE: 151 MMHG | HEART RATE: 82 BPM | TEMPERATURE: 98 F

## 2018-10-11 DIAGNOSIS — D35.2 PITUITARY MACROADENOMA: Primary | ICD-10-CM

## 2018-10-11 PROCEDURE — 99999 PR PBB SHADOW E&M-EST. PATIENT-LVL III: CPT | Mod: PBBFAC,,,

## 2018-10-11 RX ORDER — OMEGA-3 FATTY ACIDS/FISH OIL 360-1200MG
CAPSULE ORAL
COMMUNITY
Start: 2017-08-17

## 2018-10-11 RX ORDER — LISINOPRIL AND HYDROCHLOROTHIAZIDE 10; 12.5 MG/1; MG/1
1 TABLET ORAL DAILY
Refills: 2 | COMMUNITY
Start: 2018-09-16

## 2018-10-11 RX ORDER — BUPROPION HYDROCHLORIDE 150 MG/1
TABLET ORAL
COMMUNITY
Start: 2018-10-01

## 2018-10-11 RX ORDER — NITROFURANTOIN 25; 75 MG/1; MG/1
100 CAPSULE ORAL 2 TIMES DAILY
Refills: 0 | COMMUNITY
Start: 2018-08-08 | End: 2018-11-19

## 2018-10-11 RX ORDER — LORAZEPAM 0.5 MG/1
TABLET ORAL EVERY 12 HOURS PRN
COMMUNITY
Start: 2018-10-03

## 2018-10-11 RX ORDER — SIMVASTATIN 20 MG/1
TABLET, FILM COATED ORAL
COMMUNITY

## 2018-10-11 NOTE — PROGRESS NOTES
Patient seen in clinic for 2 week post op s/p transphenoidal resection of pituitary adenoma with Dr Mcadams on 09/26/2018        Intranasal access for this procedure, no incision to assess. Patient has no complaints of nasal drainage     Patient was instructed as follows:      Patient encouraged to walk as much as possible but advised to walk with family member or friend and rest as necessary.   No lifting >10lbs.   Return to work will be determined on an individual basis.   No driving or operating machinery while taking narcotic pain medication or muscle relaxants and until cleared by a surgeon.    Dr Mcadams going over pathology results with the patient    All questions were answered. Patient will follow up with Dr Mcadams 11/23/2018. Patient was encouraged to call clinic with any future concerns prior to follow up appt. If any worsening symptoms, patient should report to ED.       Talya Campbell RN, BSN  Neurosurgery

## 2018-10-24 ENCOUNTER — PATIENT MESSAGE (OUTPATIENT)
Dept: ENDOCRINOLOGY | Facility: CLINIC | Age: 63
End: 2018-10-24

## 2018-10-25 ENCOUNTER — PATIENT MESSAGE (OUTPATIENT)
Dept: ENDOCRINOLOGY | Facility: CLINIC | Age: 63
End: 2018-10-25

## 2018-10-25 DIAGNOSIS — D35.2 PITUITARY MACROADENOMA: Primary | ICD-10-CM

## 2018-10-25 RX ORDER — LEVOTHYROXINE SODIUM 50 UG/1
50 TABLET ORAL DAILY
Qty: 30 TABLET | Refills: 11 | Status: SHIPPED | OUTPATIENT
Start: 2018-10-25 | End: 2019-10-19 | Stop reason: SDUPTHER

## 2018-11-02 ENCOUNTER — LAB VISIT (OUTPATIENT)
Dept: LAB | Facility: HOSPITAL | Age: 63
End: 2018-11-02
Attending: INTERNAL MEDICINE
Payer: COMMERCIAL

## 2018-11-02 DIAGNOSIS — D35.2 PITUITARY MACROADENOMA: ICD-10-CM

## 2018-11-02 LAB
CORTIS SERPL-MCNC: 14.3 UG/DL
PROLACTIN SERPL IA-MCNC: 24 NG/ML
T4 FREE SERPL-MCNC: 1 NG/DL

## 2018-11-02 PROCEDURE — 84439 ASSAY OF FREE THYROXINE: CPT

## 2018-11-02 PROCEDURE — 84146 ASSAY OF PROLACTIN: CPT

## 2018-11-02 PROCEDURE — 82024 ASSAY OF ACTH: CPT

## 2018-11-02 PROCEDURE — 82533 TOTAL CORTISOL: CPT

## 2018-11-06 LAB — ACTH PLAS-MCNC: 17 PG/ML

## 2018-11-19 ENCOUNTER — OFFICE VISIT (OUTPATIENT)
Dept: ENDOCRINOLOGY | Facility: CLINIC | Age: 63
End: 2018-11-19
Payer: COMMERCIAL

## 2018-11-19 VITALS
WEIGHT: 214.06 LBS | BODY MASS INDEX: 35.67 KG/M2 | RESPIRATION RATE: 18 BRPM | SYSTOLIC BLOOD PRESSURE: 110 MMHG | HEIGHT: 65 IN | DIASTOLIC BLOOD PRESSURE: 72 MMHG | HEART RATE: 84 BPM

## 2018-11-19 DIAGNOSIS — E03.9 HYPOTHYROIDISM, UNSPECIFIED TYPE: ICD-10-CM

## 2018-11-19 DIAGNOSIS — D35.2 PITUITARY MACROADENOMA: Primary | ICD-10-CM

## 2018-11-19 PROCEDURE — 99999 PR PBB SHADOW E&M-EST. PATIENT-LVL III: CPT | Mod: PBBFAC,,, | Performed by: INTERNAL MEDICINE

## 2018-11-19 PROCEDURE — 99213 OFFICE O/P EST LOW 20 MIN: CPT | Mod: S$GLB,,, | Performed by: INTERNAL MEDICINE

## 2018-11-19 RX ORDER — MELATONIN 5 MG
5 CAPSULE ORAL NIGHTLY PRN
COMMUNITY
End: 2019-04-08

## 2018-11-19 NOTE — PROGRESS NOTES
"ENDOCRINOLOGY RETURN VISIT    Juliana Forrester is a 62 y.o. female presenting for follow-up of pituitary macroadenoma s/p TSS 9/26/18    HPI  During annual eye exam she was noted to have right visual field deficit which prompted MRI. This revealed large sellar mass with suprasellar extension abutting the chiasm. She notes feeling "claustrophobic" while driving recently but otherwise had not previously noted change in vision.     Initial hormonal evaluation normal with exception of mildly elevated prolactin    Underwent uncomplicated TSS on 9/26/18 with Dr. Mcadams    Path: Gonadotroph adenoma, immunopositive for SF1 and focal alpha-subunit    Interval History  Feels great following surgery  Energy improved  Mild headache after surgery which has resolved    Able to stop cortef after surgery with normal 8 AM cortisol  Remains on LT4 50 mcg daily. Taking appopriately  No DI after surgery. Denies polyuria, polydipsia    Still some "claustrophobia" with driving but otherwise no vision change. Seeing Dr. Moore tomorrow for visual fields  Weight stable    Some nasal congestion but this is improving  Has noticed increased sweating on her face and       MEDICATION    Current Outpatient Medications:     buPROPion (WELLBUTRIN XL) 150 MG TB24 tablet, , Disp: , Rfl:     levothyroxine (SYNTHROID) 50 MCG tablet, Take 1 tablet (50 mcg total) by mouth once daily., Disp: 30 tablet, Rfl: 11    lisinopril-hydrochlorothiazide (PRINZIDE,ZESTORETIC) 10-12.5 mg per tablet, Take 1 tablet by mouth once daily., Disp: , Rfl: 2    melatonin 5 mg Cap, Take 5 mg by mouth nightly as needed., Disp: , Rfl:     metFORMIN (GLUCOPHAGE-XR) 500 MG 24 hr tablet, Take 500 mg by mouth., Disp: , Rfl:     multivitamin capsule, Take 1 capsule by mouth., Disp: , Rfl:     omega-3s-dha-epa-fish oil (FISH OIL) 720-1,200 mg Cap, , Disp: , Rfl:     ranitidine (ZANTAC) 150 MG tablet, , Disp: , Rfl:     simvastatin (ZOCOR) 20 MG tablet, , Disp: , Rfl:     " "LORazepam (ATIVAN) 0.5 MG tablet, TAKE 1 TABLET BY MOUTH EVERY 8 HOURS, Disp: , Rfl:     ALLERGIES  Review of patient's allergies indicates:  No Known Allergies      PAST MEDICAL HISTORY  HTN  HLD  T2DM    FAMILY HISTORY  Denies family history of pituitary tumor    SOCIAL HISTORY  Denies tobacco, ETOH, illicits  Speech pathologist, worked at the [employer] in [place]    PHYSICAL EXAM  /72   Pulse 84   Resp 18   Ht 5' 5" (1.651 m)   Wt 97.1 kg (214 lb 1.1 oz)   BMI 35.62 kg/m²   Body mass index is 35.62 kg/m².  General Appearance:  Normal appearing, pleasant, NAD  Skin:  no striae or rashes  HEENT:  EOMI, MMM, sclera anicteric   MSK: +buffalo hump  Neurologic:  A&O x3  Psychiatric:  normal mood and affect    LABORATORY  9/18/18:  24 hr urine cortisol 31.0   Cortisol 13.58  ACTH 16  Prolactin 65.2  IGF-1 85  FT4 0.88  TSH 1.13  Sodium 142    Component      Latest Ref Rng & Units 11/2/2018   Cortisol -8 AM      4.30 - 22.40 ug/dL 14.30   ACTH      0 - 46 pg/mL 17   Free T4      0.71 - 1.51 ng/dL 1.00   Prolactin      5.2 - 26.5 ng/mL 24.0       IMAGING STUDIES  MR orbits 8/28/18:  Orbits/Optic Nerves:  Both globes are intact.  No abnormal enhancement associated with either globe.  The optic nerves are symmetric in size and signal.  No intraconal or extraconal mass lesions are identified.    There is a large mass that demonstrates homogeneous enhancement occupying the sella that demonstrates suprasellar extension and results in at least 360° of encasement of the cavernous portion of the left and 180 -270° of abutment of the supraclinoid portion of the left ICA.  The mass measures a maximum of 3.7 by 2.3 cm in the axial plane and up to 2.5 cm in the craniocaudal dimension.  The mass results in abutment and effacement of the optic chiasm.  Findings are suspicious for a large macroadenoma with the possibility of a craniopharyngioma or other sellar/suprasellar lesions not completely excluded.    Remainder of " the Intracranial Compartment (limited evaluation): No diffusion weighted abnormalities noted.  The entire brain was not included.    Skull/Extracranial Contents (limited evaluation): Bone marrow signal intensity is normal.  1. Large sellar/suprasellar mass as described in detail above.    ASSESSMENT/PLAN  62 y.o.F with HTN, T2DM and CKD who presents for follow-up of pituitary macroadenoma s/p TSS    Doing well following surgery    Pituitary macroadenoma  S/p TSS 9/2018  Will have repeat imaging in 12/18 with follow-up with Dr. Mcadams after that  Visual fields tomorrow  Labs as below    HPA  Normal cortisol after surgery  Off hydrocortisone  Check 8 AM cortisol 12 weeks post-op    HPT  Continue LT4 50 mcg daily  Repeat FT4 12 weeks post-op    DI  No evidence DI following surgery  Sodium normal, no polyuria and polydipsia    RTC 1 year with repeat labs at that time    Reanna Alves MD

## 2018-11-20 ENCOUNTER — OFFICE VISIT (OUTPATIENT)
Dept: OPHTHALMOLOGY | Facility: CLINIC | Age: 63
End: 2018-11-20
Payer: COMMERCIAL

## 2018-11-20 DIAGNOSIS — H40.013 OPEN ANGLE WITH BORDERLINE FINDINGS OF BOTH EYES: ICD-10-CM

## 2018-11-20 DIAGNOSIS — D35.2 PITUITARY MACROADENOMA: Primary | ICD-10-CM

## 2018-11-20 DIAGNOSIS — H53.411 VISUAL FIELD SCOTOMA OF RIGHT EYE: ICD-10-CM

## 2018-11-20 PROCEDURE — 92083 EXTENDED VISUAL FIELD XM: CPT | Mod: S$GLB,,, | Performed by: OPTOMETRIST

## 2018-11-20 PROCEDURE — 99999 PR PBB SHADOW E&M-EST. PATIENT-LVL I: CPT | Mod: PBBFAC,,, | Performed by: OPTOMETRIST

## 2018-11-20 PROCEDURE — 92012 INTRM OPH EXAM EST PATIENT: CPT | Mod: S$GLB,,, | Performed by: OPTOMETRIST

## 2018-11-20 NOTE — PROGRESS NOTES
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 PT was told to rtc 3 months for   f/u.  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   Feels claustrophobic when driving.   Feels like she is seeing more floaters now.   No longer taking Latanoprost          Last edited by Kelsey Dobbins MA on 11/20/2018 10:08 AM. (History)            Assessment /Plan     For exam results, see Encounter Report.    Pituitary macroadenoma  s/p TSS 9/26/18    Visual field scotoma of right eye    Open angle with borderline findings of both eyes    Visual acuity has improved from 20/50 OD prior to surgery, now 20/30 OD  Hill visual field also showed improvement as OD scotoma centrally has resolved, minimal peripheral changes remain, no defects OS  IOP stable OD, OS  No change on gOCT compared to 8/2018 scans    Monitor 6 months    RTC 6 months for dilated eye exam and repeat 24-2VF and gOCT

## 2018-12-14 ENCOUNTER — HOSPITAL ENCOUNTER (OUTPATIENT)
Dept: RADIOLOGY | Facility: HOSPITAL | Age: 63
Discharge: HOME | End: 2018-12-14
Attending: NEUROLOGICAL SURGERY
Payer: COMMERCIAL

## 2018-12-14 DIAGNOSIS — D35.2 PITUITARY MACROADENOMA: ICD-10-CM

## 2018-12-14 DIAGNOSIS — D35.2 PITUITARY MACROADENOMA: Primary | ICD-10-CM

## 2018-12-14 PROCEDURE — A9585 GADOBUTROL INJECTION: HCPCS | Performed by: NEUROLOGICAL SURGERY

## 2018-12-14 PROCEDURE — 70553 MRI BRAIN STEM W/O & W/DYE: CPT | Mod: TC

## 2018-12-14 PROCEDURE — 25500020 PHARM REV CODE 255: Performed by: NEUROLOGICAL SURGERY

## 2018-12-14 RX ORDER — GADOBUTROL 604.72 MG/ML
9 INJECTION INTRAVENOUS
Status: COMPLETED | OUTPATIENT
Start: 2018-12-14 | End: 2018-12-14

## 2018-12-14 RX ADMIN — GADOBUTROL 9 ML: 604.72 INJECTION INTRAVENOUS at 09:12

## 2018-12-17 ENCOUNTER — OFFICE VISIT (OUTPATIENT)
Dept: NEUROSURGERY | Facility: CLINIC | Age: 63
End: 2018-12-17
Payer: COMMERCIAL

## 2018-12-17 VITALS
BODY MASS INDEX: 35.49 KG/M2 | TEMPERATURE: 98 F | HEIGHT: 65 IN | DIASTOLIC BLOOD PRESSURE: 79 MMHG | RESPIRATION RATE: 18 BRPM | WEIGHT: 213 LBS | HEART RATE: 97 BPM | SYSTOLIC BLOOD PRESSURE: 126 MMHG

## 2018-12-17 DIAGNOSIS — D49.89 NEOPLASM OF HEAD: ICD-10-CM

## 2018-12-17 DIAGNOSIS — D35.2 PITUITARY MACROADENOMA: Primary | ICD-10-CM

## 2018-12-17 PROCEDURE — 99999 PR PBB SHADOW E&M-EST. PATIENT-LVL III: CPT | Mod: PBBFAC,,, | Performed by: NEUROLOGICAL SURGERY

## 2018-12-17 PROCEDURE — 99024 POSTOP FOLLOW-UP VISIT: CPT | Mod: S$GLB,,, | Performed by: NEUROLOGICAL SURGERY

## 2018-12-17 NOTE — PROGRESS NOTES
Established Pateint    SUBJECTIVE:     History of Present Illness:  Ms. white is a 62-year-old female who is seeing me today in follow-up.  It she was taken to the operating room on September 26, 2018 for a transnasal, transsphenoidal resection of a pituitary macroadenoma.  Preoperatively, she was found to have a visual field cut in her right eye on her yearly visual exam.  An MRI with and without contrast of the brain was subsequently done which showed a large pituitary macroadenoma with invasion into the left cavernous sinus as well as compression of the optic chiasm.  Given her clinical presentation and radiographic findings, the decision was made to take her to the operating room for tumor resection.  She is here today to see me in follow-up.  She was doing well.  She states that she has no headaches.  Her vision is back to baseline without any appreciable field cut.  She complains of occasional postnasal drip.  She denies any salty taste in the back of her mouth.    Review of patient's allergies indicates:  No Known Allergies    Current Outpatient Medications   Medication Sig Dispense Refill    buPROPion (WELLBUTRIN XL) 150 MG TB24 tablet       levothyroxine (SYNTHROID) 50 MCG tablet Take 1 tablet (50 mcg total) by mouth once daily. 30 tablet 11    lisinopril-hydrochlorothiazide (PRINZIDE,ZESTORETIC) 10-12.5 mg per tablet Take 1 tablet by mouth once daily.  2    LORazepam (ATIVAN) 0.5 MG tablet TAKE 1 TABLET BY MOUTH EVERY 8 HOURS      melatonin 5 mg Cap Take 5 mg by mouth nightly as needed.      metFORMIN (GLUCOPHAGE-XR) 500 MG 24 hr tablet Take 500 mg by mouth.      multivitamin capsule Take 1 capsule by mouth.      omega-3s-dha-epa-fish oil (FISH OIL) 720-1,200 mg Cap       ranitidine (ZANTAC) 150 MG tablet       simvastatin (ZOCOR) 20 MG tablet        No current facility-administered medications for this visit.        Past Medical History:   Diagnosis Date    Cataract     Hypertension      "Pituitary adenoma 9/12/2018     Past Surgical History:   Procedure Laterality Date    CHOLECYSTECTOMY  2016    RESECTION, NEOPLASM, PITUITARY, TRANSSPHENOIDAL APPROACH N/A 9/26/2018    Performed by Racquel Mcadams MD at [facility] OR [address] [place] [address]    SURGICAL REMOVAL OF PITUITARY TUMOR BY TRANSSPHENOIDAL APPROACH N/A 9/26/2018    Procedure: RESECTION, NEOPLASM, PITUITARY, TRANSSPHENOIDAL APPROACH;  Surgeon: Racquel Mcadams MD;  Location: [facility] OR [address] [place] [address];  Service: Neurosurgery;  Laterality: N/A;  WITH POSSIBLE ABDOMINAL FAT GRAFT/1.5HOUR/23HOUR STAY/TYPE/HOLD 2 UNITS/C-ARM/SUPINE/CO-SURGEON DR. LORNA GALEANA     Family History     Problem Relation (Age of Onset)    Breast cancer Maternal Aunt    Cancer Father    Cataracts Mother    Heart attack Mother    Heart disease Mother        Social History     Socioeconomic History    Marital status: Single     Spouse name: None    Number of children: None    Years of education: None    Highest education level: None   Social Needs    Financial resource strain: None    Food insecurity - worry: None    Food insecurity - inability: None    Transportation needs - medical: None    Transportation needs - non-medical: None   Occupational History    None   Tobacco Use    Smoking status: Never Smoker    Smokeless tobacco: Never Used   Substance and Sexual Activity    Alcohol use: Yes     Alcohol/week: 0.6 oz     Types: 1 Glasses of wine per week    Drug use: No    Sexual activity: No   Other Topics Concern    None   Social History Narrative    None       Review of Systems:  Review of Systems    OBJECTIVE:     Vital Signs  Temp: 98.1 °F (36.7 °C)  Pulse: 97  Resp: 18  BP: 126/79  Pain Score: 0-No pain  Height: 5' 5" (165.1 cm)  Weight: 96.6 kg (213 lb)  Body mass index is 35.45 kg/m².    Physical Exam:  Physical Exam:  Vitals reviewed.    Constitutional: She appears well-developed and well-nourished. No distress.     Eyes: Pupils are equal, round, and reactive to light. Conjunctivae " and EOM are normal.     Cardiovascular: Normal rate, regular rhythm, normal pulses and no edema.     Abdominal: Soft. Bowel sounds are normal.     Skin: Skin displays no rash on trunk and no rash on extremities. Skin displays no lesions on trunk and no lesions on extremities.     Psych/Behavior: She is alert. She is oriented to person, place, and time. She has a normal mood and affect.     Musculoskeletal: Gait is normal.        Neck: Range of motion is full. There is no tenderness. Muscle strength is 5/5. Tone is normal.        Back: Range of motion is full. There is no tenderness. Muscle strength is 5/5. Tone is normal.        Right Upper Extremities: Range of motion is full. There is no tenderness. Muscle strength is 5/5. Tone is normal.        Left Upper Extremities: Range of motion is full. There is no tenderness. Muscle strength is 5/5. Tone is normal.       Right Lower Extremities: Range of motion is full. There is no tenderness. Muscle strength is 5/5. Tone is normal.        Left Lower Extremities: Range of motion is full. There is no tenderness. Muscle strength is 5/5. Tone is normal.     Neurological:        Coordination: She has a normal Romberg Test, normal finger to nose coordination and normal tandem walking coordination.        Sensory: There is no sensory deficit in the trunk. There is no sensory deficit in the extremities.        DTRs: DTRs are DTRS NORMAL AND SYMMETRICnormal and symmetric. She displays no Babinski's sign on the right side. She displays no Babinski's sign on the left side.        Cranial nerves: Cranial nerve(s) II, III, IV, V, VI, VII, VIII, IX, X, XI and XII are intact.         Diagnostic Results:  She has an MRI with and without contrast of the brain available for review which I personally reviewed.  This shows interval tumor debulking.  The sellar and suprasellar component of the tumor have been decompressed.  The stalk is slightly deviated towards the right.  The optic chiasm  is decompressed with no tumor encroaching the chiasm.  There is still a portion of the tumor in the left cavernous sinus and into the left temporal lobe measuring 2.3 x 2.5 x 2.3 cm.    ASSESSMENT/PLAN:     Ms. Forrester is a 62-year-old female status post transnasal, transsphenoidal resection of pituitary adenoma.  She is doing well.  She reports improved vision and no evidence of CSF rhinorrhea.  Her imaging studies are as described above.  There is expected residual tumor due to the fact that this portion of the tumor is within the cavernous sinus.  We will follow this conservatively.  I will plan on a repeat MRI with and without contrast of the brain in 3 months.  She will follow-up at that time.  She knows she can call with any further questions or concerns in the meantime.  We did talk about the possibility of radiation versus craniotomy for tumor resection showed this residual tumor grow.        Note dictated with voice recognition software, please excuse any grammatical errors.

## 2019-04-08 ENCOUNTER — OFFICE VISIT (OUTPATIENT)
Dept: NEUROSURGERY | Facility: CLINIC | Age: 64
End: 2019-04-08
Payer: COMMERCIAL

## 2019-04-08 ENCOUNTER — HOSPITAL ENCOUNTER (OUTPATIENT)
Dept: RADIOLOGY | Facility: HOSPITAL | Age: 64
Discharge: HOME | End: 2019-04-08
Attending: NEUROLOGICAL SURGERY
Payer: COMMERCIAL

## 2019-04-08 VITALS
DIASTOLIC BLOOD PRESSURE: 78 MMHG | TEMPERATURE: 98 F | HEART RATE: 92 BPM | SYSTOLIC BLOOD PRESSURE: 143 MMHG | BODY MASS INDEX: 36.16 KG/M2 | WEIGHT: 217.31 LBS

## 2019-04-08 DIAGNOSIS — D49.89 NEOPLASM OF HEAD: ICD-10-CM

## 2019-04-08 DIAGNOSIS — D35.2 PITUITARY MACROADENOMA: Primary | ICD-10-CM

## 2019-04-08 PROCEDURE — A9585 GADOBUTROL INJECTION: HCPCS | Performed by: NEUROLOGICAL SURGERY

## 2019-04-08 PROCEDURE — 99213 PR OFFICE/OUTPT VISIT, EST, LEVL III, 20-29 MIN: ICD-10-PCS | Mod: S$GLB,,, | Performed by: NEUROLOGICAL SURGERY

## 2019-04-08 PROCEDURE — 99999 PR PBB SHADOW E&M-EST. PATIENT-LVL III: CPT | Mod: PBBFAC,,, | Performed by: NEUROLOGICAL SURGERY

## 2019-04-08 PROCEDURE — 70553 MRI BRAIN W WO CONTRAST: ICD-10-PCS | Mod: 26,,, | Performed by: RADIOLOGY

## 2019-04-08 PROCEDURE — 99999 PR PBB SHADOW E&M-EST. PATIENT-LVL III: ICD-10-PCS | Mod: PBBFAC,,, | Performed by: NEUROLOGICAL SURGERY

## 2019-04-08 PROCEDURE — 25500020 PHARM REV CODE 255: Performed by: NEUROLOGICAL SURGERY

## 2019-04-08 PROCEDURE — 70553 MRI BRAIN STEM W/O & W/DYE: CPT | Mod: 26,,, | Performed by: RADIOLOGY

## 2019-04-08 PROCEDURE — 70553 MRI BRAIN STEM W/O & W/DYE: CPT | Mod: TC

## 2019-04-08 PROCEDURE — 99213 OFFICE O/P EST LOW 20 MIN: CPT | Mod: S$GLB,,, | Performed by: NEUROLOGICAL SURGERY

## 2019-04-08 RX ORDER — GADOBUTROL 604.72 MG/ML
10 INJECTION INTRAVENOUS
Status: COMPLETED | OUTPATIENT
Start: 2019-04-08 | End: 2019-04-08

## 2019-04-08 RX ORDER — OMEPRAZOLE 10 MG/1
10 CAPSULE, DELAYED RELEASE ORAL DAILY
COMMUNITY
End: 2019-11-12

## 2019-04-08 RX ADMIN — GADOBUTROL 10 ML: 604.72 INJECTION INTRAVENOUS at 12:04

## 2019-04-09 LAB
CREAT SERPL-MCNC: 1.1 MG/DL (ref 0.5–1.4)
SAMPLE: NORMAL

## 2019-04-10 ENCOUNTER — PATIENT MESSAGE (OUTPATIENT)
Dept: NEUROSURGERY | Facility: CLINIC | Age: 64
End: 2019-04-10

## 2019-05-20 ENCOUNTER — OFFICE VISIT (OUTPATIENT)
Dept: OPHTHALMOLOGY | Facility: CLINIC | Age: 64
End: 2019-05-20
Payer: COMMERCIAL

## 2019-05-20 DIAGNOSIS — H40.013 OPEN ANGLE WITH BORDERLINE FINDINGS OF BOTH EYES: ICD-10-CM

## 2019-05-20 DIAGNOSIS — D35.2 PITUITARY MACROADENOMA: Primary | ICD-10-CM

## 2019-05-20 DIAGNOSIS — E11.9 TYPE 2 DIABETES MELLITUS WITHOUT RETINOPATHY: ICD-10-CM

## 2019-05-20 PROCEDURE — 92014 PR EYE EXAM, EST PATIENT,COMPREHESV: ICD-10-PCS | Mod: S$GLB,,, | Performed by: OPTOMETRIST

## 2019-05-20 PROCEDURE — 99999 PR PBB SHADOW E&M-EST. PATIENT-LVL II: CPT | Mod: PBBFAC,,, | Performed by: OPTOMETRIST

## 2019-05-20 PROCEDURE — 92014 COMPRE OPH EXAM EST PT 1/>: CPT | Mod: S$GLB,,, | Performed by: OPTOMETRIST

## 2019-05-20 PROCEDURE — 92083 EXTENDED VISUAL FIELD XM: CPT | Mod: S$GLB,,, | Performed by: OPTOMETRIST

## 2019-05-20 PROCEDURE — 92133 CPTRZD OPH DX IMG PST SGM ON: CPT | Mod: S$GLB,,, | Performed by: OPTOMETRIST

## 2019-05-20 PROCEDURE — 92083 HUMPHREY VISUAL FIELD - OU - BOTH EYES: ICD-10-PCS | Mod: S$GLB,,, | Performed by: OPTOMETRIST

## 2019-05-20 PROCEDURE — 92133 POSTERIOR SEGMENT OCT OPTIC NERVE(OCULAR COHERENCE TOMOGRAPHY) - OU - BOTH EYES: ICD-10-PCS | Mod: S$GLB,,, | Performed by: OPTOMETRIST

## 2019-05-20 PROCEDURE — 99999 PR PBB SHADOW E&M-EST. PATIENT-LVL II: ICD-10-PCS | Mod: PBBFAC,,, | Performed by: OPTOMETRIST

## 2019-05-21 NOTE — PROGRESS NOTES
HPI     PT was last seen on 11/20/18 with DNL. PT was told to rtc 6 months for   dilated eye exam and repeat 24-2VF and gOCT.   No changes to vision  Pt c/o dryness, uses gel about once a week with no relief  Pituitary macroadenoma-s/p TSS 9/26/18  Visual field scotoma of right eye  Medication eye drops if any: none  Last HVF: 5/20/19  Last gOCT: 5/20/19  Last SDP: none       Last edited by Kelsey Dobbins MA on 5/20/2019  3:50 PM. (History)            Assessment /Plan     For exam results, see Encounter Report.    Pituitary macroadenoma  -     Hill Visual Field - OU - Extended - Both Eyes  VF scotoma resolved, essentially normal VF OD now, see below  Monitor 12 months  Repeat VF 12 months or sooner per Dr Mcadams    Open angle with borderline findings of both eyes  -     Hill Visual Field - OU - Extended - Both Eyes  -     Posterior Segment OCT Optic Nerve- Both eyes  IOP and gOCT stable today   Monitor 12 months    Type 2 diabetes mellitus without retinopathy  No diabetic retinopathy OD, OS  Continue close care with PCP  Monitor 12 months          RTC 1 yr for dilated eye exam, 24-2VF and gOCT or PRN if any problems.   Discussed above and answered questions.

## 2019-05-29 NOTE — PROGRESS NOTES
Established Pateint    SUBJECTIVE:     History of Present Illness:   is a 63-year-old female who is seeing me today in follow-up.  Her last neurosurgery clinic appointment was on December 17, 2018.  She was taken to the operating room in September 2018 for a transnasal, transsphenoidal resection of pituitary macroadenoma.  Preoperatively, she was found to have a visual field cut in her right eye on her yearly visual exam.  Given her clinical presentation and radiographic findings she was taken to the operating room for tumor resection.  At the time of her last follow-up she was doing well.  Her vision was back to baseline without any appreciable field cut.  She is here today to see me in follow-up with a repeat MRI with and without contrast of the brain.  She continues to do well.  She denies headaches.  Her vision remains intact.  She is set to see her ophthalmologist for formal visual field testing in the near future.    Review of patient's allergies indicates:  No Known Allergies    Current Outpatient Medications   Medication Sig Dispense Refill    buPROPion (WELLBUTRIN XL) 150 MG TB24 tablet       levothyroxine (SYNTHROID) 50 MCG tablet Take 1 tablet (50 mcg total) by mouth once daily. 30 tablet 11    lisinopril-hydrochlorothiazide (PRINZIDE,ZESTORETIC) 10-12.5 mg per tablet Take 1 tablet by mouth once daily. Take 1 tablet every other day  2    metFORMIN (GLUCOPHAGE-XR) 500 MG 24 hr tablet Take 500 mg by mouth once daily.       multivitamin capsule Take 1 capsule by mouth.      omega-3s-dha-epa-fish oil (FISH OIL) 720-1,200 mg Cap       omeprazole (PRILOSEC) 10 MG capsule Take 10 mg by mouth once daily.      ranitidine (ZANTAC) 150 MG tablet       simvastatin (ZOCOR) 20 MG tablet       LORazepam (ATIVAN) 0.5 MG tablet TAKE 1 TABLET BY MOUTH EVERY 8 HOURS       No current facility-administered medications for this visit.        Past Medical History:   Diagnosis Date    Hypertension      Pituitary adenoma 9/12/2018     Past Surgical History:   Procedure Laterality Date    CHOLECYSTECTOMY  2016    RESECTION, NEOPLASM, PITUITARY, TRANSSPHENOIDAL APPROACH N/A 9/26/2018    Performed by Racquel Mcadams MD at [facility] [place] [address] [place] [address]     Family History     Problem Relation (Age of Onset)    Breast cancer Maternal Aunt    Cancer Father    Cataracts Mother    Heart attack Mother    Heart disease Mother        Social History     Socioeconomic History    Marital status: Single     Spouse name: Not on file    Number of children: Not on file    Years of education: Not on file    Highest education level: Not on file   Occupational History    Not on file   Social Needs    Financial resource strain: Not on file    Food insecurity:     Worry: Not on file     Inability: Not on file    Transportation needs:     Medical: Not on file     Non-medical: Not on file   Tobacco Use    Smoking status: Never Smoker    Smokeless tobacco: Never Used   Substance and Sexual Activity    Alcohol use: Yes     Alcohol/week: 0.6 oz     Types: 1 Glasses of wine per week    Drug use: No    Sexual activity: Never   Lifestyle    Physical activity:     Days per week: Not on file     Minutes per session: Not on file    Stress: Not on file   Relationships    Social connections:     Talks on phone: Not on file     Gets together: Not on file     Attends Hindu service: Not on file     Active member of club or organization: Not on file     Attends meetings of clubs or organizations: Not on file     Relationship status: Not on file   Other Topics Concern    Not on file   Social History Narrative    Not on file       Review of Systems:  Review of Systems    OBJECTIVE:     Vital Signs  Temp: 97.8 °F (36.6 °C)  Pulse: 92  BP: (!) 143/78  Pain Score: 0-No pain  Weight: 98.6 kg (217 lb 4.8 oz)  Body mass index is 36.16 kg/m².    Physical Exam:  Physical Exam:  Vitals reviewed.    Constitutional: She appears well-developed and  well-nourished. No distress.     Eyes: Pupils are equal, round, and reactive to light. Conjunctivae and EOM are normal.     Cardiovascular: Normal rate, regular rhythm, normal pulses and no edema.     Abdominal: Soft. Bowel sounds are normal.     Skin: Skin displays no rash on trunk and no rash on extremities. Skin displays no lesions on trunk and no lesions on extremities.     Psych/Behavior: She is alert. She is oriented to person, place, and time. She has a normal mood and affect.     Musculoskeletal: Gait is normal.        Neck: Range of motion is full. There is no tenderness. Muscle strength is 5/5. Tone is normal.        Back: Range of motion is full. There is no tenderness. Muscle strength is 5/5. Tone is normal.        Right Upper Extremities: Range of motion is full. There is no tenderness. Muscle strength is 5/5. Tone is normal.        Left Upper Extremities: Range of motion is full. There is no tenderness. Muscle strength is 5/5. Tone is normal.       Right Lower Extremities: Range of motion is full. There is no tenderness. Muscle strength is 5/5. Tone is normal.        Left Lower Extremities: Range of motion is full. There is no tenderness. Muscle strength is 5/5. Tone is normal.     Neurological:        Coordination: She has a normal Romberg Test, normal finger to nose coordination and normal tandem walking coordination.        Sensory: There is no sensory deficit in the trunk. There is no sensory deficit in the extremities.        DTRs: DTRs are DTRS NORMAL AND SYMMETRICnormal and symmetric. She displays no Babinski's sign on the right side. She displays no Babinski's sign on the left side.        Cranial nerves: Cranial nerve(s) II, III, IV, V, VI, VII, VIII, IX, X, XI and XII are intact.         Diagnostic Results:  She is an MRI with and without contrast of the brain available for review which I personally reviewed.  This shows a residual tumor in the left side of the sella and left cavernous sinus  measuring approximately 2.2 x 2.2 x 2 cm.  This is actually smaller than on previous studies.  Previously it measured 2.3 x 2.5 x 2.3 cm.  There is no contact or compression of the optic chiasm.  There is slight deviation of the pituitary stalk to the right.    ASSESSMENT/PLAN:     Ms. Forrester is a 63-year-old female status post transnasal, transsphenoidal resection of pituitary adenoma.  She is doing well without any detrimental changes.  Her imaging studies are as described above in actually shows a decrease in size in the residual tumor.  I would like to continue to follow the tumor conservatively.  We will plan on a repeat MRI and follow-up appointment in approximately 6-8 months.  She will follow-up with her ophthalmologist as scheduled.  If there are any changes or any concerns in that time frame she knows she can call with any questions or concerns.  Otherwise I will see her back in 6-8 months.        Note dictated with voice recognition software, please excuse any grammatical errors.

## 2019-07-31 ENCOUNTER — TELEPHONE (OUTPATIENT)
Dept: ENDOCRINOLOGY | Facility: CLINIC | Age: 64
End: 2019-07-31

## 2019-07-31 NOTE — TELEPHONE ENCOUNTER
----- Message from Nadege Pino sent at 7/31/2019 11:28 AM CDT -----  Contact: Juliana   tel : [phone]   Needs Advice    Reason for call:  Pt.says she is coming to [facility] on 11/18th, Monday, asking if you can see her around 1 or 2 pm.  Asking when is Ms. Alves's schedule to open?    Continues to try and get this appt. Date and time thru the portal.   Asking if  You can help her get scheduled since travelling from [place].    Pls call ref. This.         Communication Preference:   Phone     Additional Information: Pls call.

## 2019-10-09 ENCOUNTER — PATIENT MESSAGE (OUTPATIENT)
Dept: ENDOCRINOLOGY | Facility: CLINIC | Age: 64
End: 2019-10-09

## 2019-10-09 DIAGNOSIS — D35.2 PITUITARY MACROADENOMA: Primary | ICD-10-CM

## 2019-10-19 RX ORDER — LEVOTHYROXINE SODIUM 50 UG/1
TABLET ORAL
Qty: 30 TABLET | Refills: 11 | Status: SHIPPED | OUTPATIENT
Start: 2019-10-19 | End: 2020-11-16

## 2019-10-24 ENCOUNTER — PATIENT MESSAGE (OUTPATIENT)
Dept: NEUROSURGERY | Facility: CLINIC | Age: 64
End: 2019-10-24

## 2019-10-31 ENCOUNTER — PATIENT MESSAGE (OUTPATIENT)
Dept: ENDOCRINOLOGY | Facility: CLINIC | Age: 64
End: 2019-10-31

## 2019-11-12 ENCOUNTER — OFFICE VISIT (OUTPATIENT)
Dept: ENDOCRINOLOGY | Facility: CLINIC | Age: 64
End: 2019-11-12
Payer: COMMERCIAL

## 2019-11-12 ENCOUNTER — PATIENT MESSAGE (OUTPATIENT)
Dept: ENDOCRINOLOGY | Facility: CLINIC | Age: 64
End: 2019-11-12

## 2019-11-12 ENCOUNTER — TELEPHONE (OUTPATIENT)
Dept: ENDOCRINOLOGY | Facility: CLINIC | Age: 64
End: 2019-11-12

## 2019-11-12 VITALS
DIASTOLIC BLOOD PRESSURE: 92 MMHG | HEART RATE: 84 BPM | TEMPERATURE: 98 F | WEIGHT: 201.06 LBS | SYSTOLIC BLOOD PRESSURE: 150 MMHG | BODY MASS INDEX: 33.46 KG/M2

## 2019-11-12 DIAGNOSIS — D35.2 PITUITARY MACROADENOMA: Primary | ICD-10-CM

## 2019-11-12 DIAGNOSIS — D49.7 PITUITARY TUMOR: Primary | ICD-10-CM

## 2019-11-12 DIAGNOSIS — E11.9 TYPE 2 DIABETES MELLITUS WITHOUT RETINOPATHY: ICD-10-CM

## 2019-11-12 DIAGNOSIS — E03.9 HYPOTHYROIDISM, UNSPECIFIED TYPE: ICD-10-CM

## 2019-11-12 DIAGNOSIS — E03.8 OTHER SPECIFIED HYPOTHYROIDISM: ICD-10-CM

## 2019-11-12 PROCEDURE — 99999 PR PBB SHADOW E&M-EST. PATIENT-LVL III: ICD-10-PCS | Mod: PBBFAC,,, | Performed by: INTERNAL MEDICINE

## 2019-11-12 PROCEDURE — 99214 PR OFFICE/OUTPT VISIT, EST, LEVL IV, 30-39 MIN: ICD-10-PCS | Mod: S$GLB,,, | Performed by: INTERNAL MEDICINE

## 2019-11-12 PROCEDURE — 99214 OFFICE O/P EST MOD 30 MIN: CPT | Mod: S$GLB,,, | Performed by: INTERNAL MEDICINE

## 2019-11-12 PROCEDURE — 99999 PR PBB SHADOW E&M-EST. PATIENT-LVL III: CPT | Mod: PBBFAC,,, | Performed by: INTERNAL MEDICINE

## 2019-11-12 NOTE — PATIENT INSTRUCTIONS
There is a genetic syndrome called MEN 1 that causes pituitary tumors, hyperparathyroidism, and pancreatic tumors.    Please keep an eye out for high calcium, broken bones, kidney stones, decline in bone density for yourself.    For you and your sister watch out for abdominal pain, diarrhea, flushing, unexplained low blood sugar (looking for symptoms of pancreatic tumor).      For you sister watch out for vision changes, headaches, and other pituitary symptoms (low thyroid hormone, lightheadedness/dizzines).      Take 50  Mcg of levothyroxine every other day then in 6 weeks we will check TSH and free T4 to see if we can get you off thyroid hormone.

## 2019-11-12 NOTE — PROGRESS NOTES
"ENDOCRINOLOGY RETURN VISIT  11/12/2019    Juliana Forrester is a 63 y.o. 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 This revealed large sellar mass with suprasellar extension abutting the chiasm. She notes feeling "claustrophobic" while driving recently but otherwise had not previously noted change in vision.     Initial hormonal evaluation normal with exception of mildly elevated prolactin    Underwent uncomplicated TSS on 9/26/18 with Dr. Mcadams    Path: Gonadotroph adenoma, immunopositive for SF1 and focal alpha-subunit    Interval History  Feels great following surgery  No HA or vision change.  Sees Dr. Glass (ophtho in [place]), 5/2019 VF normal (much improved) f/u in 1 year.    Sister recently diagnosed w/ primary hyperparathyroidism (hypercalcemia, blx wrist fractures, no kidney stones).  Father had liver or colon cancer and both sisters have had negative screening.  Ms. Forrester has no hx of hypercalcemia, fracture, nephrolithiasis.      Remains on LT4 50 mcg daily. Taking appropriately.  This was started around the time of TSS when she was found to have mildly low fT4 and normal TSH just prior to surgery.  Unclear if she still needs this.    No DI after surgery. Denies polyuria, polydipsia    No complaints today    MEDICATION    Current Outpatient Medications:     buPROPion (WELLBUTRIN XL) 150 MG TB24 tablet, , Disp: , Rfl:     levothyroxine (SYNTHROID) 50 MCG tablet, TAKE 1 TABLET BY MOUTH EVERY DAY, Disp: 30 tablet, Rfl: 11    lisinopril-hydrochlorothiazide (PRINZIDE,ZESTORETIC) 10-12.5 mg per tablet, Take 1 tablet by mouth once daily. Take 1 tablet every other day, Disp: , Rfl: 2    metFORMIN (GLUCOPHAGE-XR) 500 MG 24 hr tablet, Take 500 mg by mouth once daily. , Disp: , Rfl:     multivitamin capsule, Take 1 capsule by mouth., Disp: , Rfl:     " omega-3s-dha-epa-fish oil (FISH OIL) 720-1,200 mg Cap, , Disp: , Rfl:     simvastatin (ZOCOR) 20 MG tablet, , Disp: , Rfl:     LORazepam (ATIVAN) 0.5 MG tablet, every 12 (twelve) hours as needed. , Disp: , Rfl:     ALLERGIES  Review of patient's allergies indicates:  No Known Allergies    FAMILY HISTORY  Denies family history of pituitary tumor  Sister with primary hyperparathyroidism  Father with liver or intestinal cancer?    SOCIAL HISTORY  Denies tobacco, ETOH, illicits  Speech pathologist, worked at the [employer] in [place]    PHYSICAL EXAM  BP (!) 150/92   Pulse 84   Temp 97.6 °F (36.4 °C) (Oral)   Wt 91.2 kg (201 lb 1 oz)   BMI 33.46 kg/m²   Body mass index is 33.46 kg/m².  General Appearance:  Normal appearing, pleasant, NAD  Skin:  no striae or rashes  HEENT:  EOMI, MMM, sclera anicteric   MSK: small DC fat pain, no SC fullness  Neurologic:  A&O x3  Psychiatric:  normal mood and affect    LABORATORY  9/18/18:  24 hr urine cortisol 31.0   Cortisol 13.58  ACTH 16  Prolactin 65.2  IGF-1 85  FT4 0.88  TSH 1.13  Sodium 142     Ref. Range 12/14/2018 08:38 10/25/19  Care everywhere   Cortisol -8 AM Latest Ref Range: 4.30 - 22.40 ug/dL 19.00 16.2   ACTH Latest Ref Range: 0 - 46 pg/mL 24 21   Somatomedin (IGF-I) Latest Ref Range: 35 - 201 ng/mL 76 50  (zscore -1.3)   Free T4 Latest Ref Range: 0.71 - 1.51 ng/dL 0.98 1.05   FSH Latest Ref Range: See Text mIU/mL 8.70 10.5   LH Latest Ref Range: See Text mIU/mL 2.2 2.87   Prolactin Latest Ref Range: 5.2 - 26.5 ng/mL 22.5 18.3     Labs above reviewed      IMAGING STUDIES  MR orbits 8/28/18: before surgery  There is a large mass that demonstrates homogeneous enhancement occupying the sella that demonstrates suprasellar extension and results in at least 360° of encasement of the cavernous portion of the left and 180 -270° of abutment of the supraclinoid portion of the left ICA.  The mass measures a maximum of 3.7 by 2.3 cm in the axial plane and up to 2.5 cm in  the craniocaudal dimension.  The mass results in abutment and effacement of the optic chiasm.      MRI brain 4/8/19:  Slight decrease in the residual left-sided pituitary adenoma when compared to the most recent study of December 2018.  The lesion now measures approximately 2.2 x 2.2 x 2.0 cm. Invading L cavernous sinus   2. Stable postoperative changes from transsphenoidal resection of the rest of the pituitary adenoma.    ASSESSMENT/PLAN  63 y.o.F with HTN, T2DM and CKD who presents for follow-up of pituitary macroadenoma s/p TSS    Doing well following surgery with residual tumor with invasion into L cavernous sinus with some decrease in size.  Follows w/ Dr. Mcadams.       Problem List Items Addressed This Visit        Oncology    Pituitary macroadenoma - Primary     Last MRI 4/2019 with slight decrease in size but invasion into L cavernous sinus.  No more visual deficit.  Will see Dr. Mcadams soon w/ repeat MRI to look for interval change.      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  No indication for genetic testing at this time as she does not have 2 associated endocrinopathies and neither she or her sister have children.           Relevant Orders    TSH    T4, free       Endocrine    Type 2 diabetes mellitus without retinopathy     Last HbA1c <5.5 in care everywhere.  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  Will attempt to reduce dose in half and repeat labs in 6 weeks to see if she still requires replacement after surgery.           Relevant Orders    TSH    T4, free        RTC in 1 year unless clinical change    Tevin Ron MD

## 2019-11-13 PROBLEM — E03.9 HYPOTHYROIDISM: Status: ACTIVE | Noted: 2018-09-26

## 2019-11-13 NOTE — ASSESSMENT & PLAN NOTE
Last MRI 4/2019 with slight decrease in size but invasion into L cavernous sinus.  No more visual deficit.  Will see Dr. Mcadams soon w/ repeat MRI to look for interval change.      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  No indication for genetic testing at this time as she does not have 2 associated endocrinopathies and neither she or her sister have children.

## 2019-11-13 NOTE — ASSESSMENT & PLAN NOTE
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  Will attempt to reduce dose in half and repeat labs in 6 weeks to see if she still requires replacement after surgery.

## 2019-11-21 ENCOUNTER — TELEPHONE (OUTPATIENT)
Dept: NEUROSURGERY | Facility: CLINIC | Age: 64
End: 2019-11-21

## 2019-11-21 DIAGNOSIS — D35.2 PITUITARY ADENOMA: Primary | ICD-10-CM

## 2019-12-20 ENCOUNTER — LAB VISIT (OUTPATIENT)
Dept: LAB | Facility: HOSPITAL | Age: 64
End: 2019-12-20
Attending: INTERNAL MEDICINE
Payer: MEDICARE

## 2019-12-20 DIAGNOSIS — D35.2 PITUITARY ADENOMA: ICD-10-CM

## 2019-12-20 DIAGNOSIS — D35.2 PITUITARY MACROADENOMA: ICD-10-CM

## 2019-12-20 DIAGNOSIS — E03.9 HYPOTHYROIDISM, UNSPECIFIED TYPE: ICD-10-CM

## 2019-12-20 LAB
CREAT SERPL-MCNC: 1.2 MG/DL (ref 0.5–1.4)
EST. GFR  (AFRICAN AMERICAN): 55.2 ML/MIN/1.73 M^2
EST. GFR  (NON AFRICAN AMERICAN): 47.9 ML/MIN/1.73 M^2
T4 FREE SERPL-MCNC: 0.77 NG/DL (ref 0.71–1.51)
TSH SERPL DL<=0.005 MIU/L-ACNC: 1.48 UIU/ML (ref 0.4–4)

## 2019-12-20 PROCEDURE — 84439 ASSAY OF FREE THYROXINE: CPT

## 2019-12-20 PROCEDURE — 84443 ASSAY THYROID STIM HORMONE: CPT

## 2019-12-20 PROCEDURE — 36415 COLL VENOUS BLD VENIPUNCTURE: CPT

## 2019-12-20 PROCEDURE — 82565 ASSAY OF CREATININE: CPT

## 2020-01-20 ENCOUNTER — OFFICE VISIT (OUTPATIENT)
Dept: NEUROSURGERY | Facility: CLINIC | Age: 65
End: 2020-01-20
Payer: MEDICARE

## 2020-01-20 ENCOUNTER — HOSPITAL ENCOUNTER (OUTPATIENT)
Dept: RADIOLOGY | Facility: HOSPITAL | Age: 65
Discharge: HOME | End: 2020-01-20
Attending: NEUROLOGICAL SURGERY
Payer: MEDICARE

## 2020-01-20 VITALS
BODY MASS INDEX: 32.9 KG/M2 | SYSTOLIC BLOOD PRESSURE: 156 MMHG | HEART RATE: 71 BPM | TEMPERATURE: 98 F | DIASTOLIC BLOOD PRESSURE: 80 MMHG | WEIGHT: 197.5 LBS | HEIGHT: 65 IN

## 2020-01-20 DIAGNOSIS — D35.2 PITUITARY MACROADENOMA: Primary | ICD-10-CM

## 2020-01-20 DIAGNOSIS — D35.2 PITUITARY ADENOMA: ICD-10-CM

## 2020-01-20 PROCEDURE — 70553 MRI BRAIN W WO CONTRAST: ICD-10-PCS | Mod: 26,,, | Performed by: RADIOLOGY

## 2020-01-20 PROCEDURE — 3079F PR MOST RECENT DIASTOLIC BLOOD PRESSURE 80-89 MM HG: ICD-10-PCS | Mod: CPTII,S$GLB,, | Performed by: NEUROLOGICAL SURGERY

## 2020-01-20 PROCEDURE — 70553 MRI BRAIN STEM W/O & W/DYE: CPT | Mod: TC

## 2020-01-20 PROCEDURE — 1101F PR PT FALLS ASSESS DOC 0-1 FALLS W/OUT INJ PAST YR: ICD-10-PCS | Mod: CPTII,S$GLB,, | Performed by: NEUROLOGICAL SURGERY

## 2020-01-20 PROCEDURE — 3077F PR MOST RECENT SYSTOLIC BLOOD PRESSURE >= 140 MM HG: ICD-10-PCS | Mod: CPTII,S$GLB,, | Performed by: NEUROLOGICAL SURGERY

## 2020-01-20 PROCEDURE — 25500020 PHARM REV CODE 255: Performed by: NEUROLOGICAL SURGERY

## 2020-01-20 PROCEDURE — 99999 PR PBB SHADOW E&M-EST. PATIENT-LVL III: ICD-10-PCS | Mod: PBBFAC,,, | Performed by: NEUROLOGICAL SURGERY

## 2020-01-20 PROCEDURE — 99999 PR PBB SHADOW E&M-EST. PATIENT-LVL III: CPT | Mod: PBBFAC,,, | Performed by: NEUROLOGICAL SURGERY

## 2020-01-20 PROCEDURE — 99213 OFFICE O/P EST LOW 20 MIN: CPT | Mod: S$GLB,,, | Performed by: NEUROLOGICAL SURGERY

## 2020-01-20 PROCEDURE — A9585 GADOBUTROL INJECTION: HCPCS | Performed by: NEUROLOGICAL SURGERY

## 2020-01-20 PROCEDURE — 3008F BODY MASS INDEX DOCD: CPT | Mod: CPTII,S$GLB,, | Performed by: NEUROLOGICAL SURGERY

## 2020-01-20 PROCEDURE — 3077F SYST BP >= 140 MM HG: CPT | Mod: CPTII,S$GLB,, | Performed by: NEUROLOGICAL SURGERY

## 2020-01-20 PROCEDURE — 99213 PR OFFICE/OUTPT VISIT, EST, LEVL III, 20-29 MIN: ICD-10-PCS | Mod: S$GLB,,, | Performed by: NEUROLOGICAL SURGERY

## 2020-01-20 PROCEDURE — 3008F PR BODY MASS INDEX (BMI) DOCUMENTED: ICD-10-PCS | Mod: CPTII,S$GLB,, | Performed by: NEUROLOGICAL SURGERY

## 2020-01-20 PROCEDURE — 3079F DIAST BP 80-89 MM HG: CPT | Mod: CPTII,S$GLB,, | Performed by: NEUROLOGICAL SURGERY

## 2020-01-20 PROCEDURE — 1101F PT FALLS ASSESS-DOCD LE1/YR: CPT | Mod: CPTII,S$GLB,, | Performed by: NEUROLOGICAL SURGERY

## 2020-01-20 PROCEDURE — 70553 MRI BRAIN STEM W/O & W/DYE: CPT | Mod: 26,,, | Performed by: RADIOLOGY

## 2020-01-20 RX ORDER — GADOBUTROL 604.72 MG/ML
5 INJECTION INTRAVENOUS
Status: COMPLETED | OUTPATIENT
Start: 2020-01-20 | End: 2020-01-20

## 2020-01-20 RX ADMIN — GADOBUTROL 5 ML: 604.72 INJECTION INTRAVENOUS at 03:01

## 2020-01-20 NOTE — PROGRESS NOTES
Established Pateint    SUBJECTIVE:     History of Present Illness:  Ms. Forrester is a 64-year-old female who is seeing me today in follow-up.  Her last neurosurgery clinic appointment was on April 8, 2019.  She was taken to the operating room in September 2018 for a transnasal, transsphenoidal resection of pituitary macroadenoma.  Preoperatively, she was found to have a visual field cut in her right eye on her yearly visual exam.  Given her clinical presentation and radiographic findings she was taken to the operating room for tumor resection and debulking.  At the time of her last follow-up she is doing well.  Her vision was back to baseline without any appreciable field cut.  She is here today to see me in follow-up.  She continues to do well.  She denies headaches.  She denies any visual changes.  She is set to see her ophthalmologist for formal visual field testing in the near future.    Review of patient's allergies indicates:  No Known Allergies    Current Outpatient Medications   Medication Sig Dispense Refill    buPROPion (WELLBUTRIN XL) 150 MG TB24 tablet       levothyroxine (SYNTHROID) 50 MCG tablet TAKE 1 TABLET BY MOUTH EVERY DAY 30 tablet 11    lisinopril-hydrochlorothiazide (PRINZIDE,ZESTORETIC) 10-12.5 mg per tablet Take 1 tablet by mouth once daily. Take 1 tablet every other day  2    LORazepam (ATIVAN) 0.5 MG tablet every 12 (twelve) hours as needed.       multivitamin capsule Take 1 capsule by mouth.      omega-3s-dha-epa-fish oil (FISH OIL) 720-1,200 mg Cap       simvastatin (ZOCOR) 20 MG tablet       metFORMIN (GLUCOPHAGE-XR) 500 MG 24 hr tablet Take 500 mg by mouth once daily.        No current facility-administered medications for this visit.        Past Medical History:   Diagnosis Date    Hypertension     Pituitary adenoma 9/12/2018     Past Surgical History:   Procedure Laterality Date    CHOLECYSTECTOMY  2016    SURGICAL REMOVAL OF PITUITARY TUMOR BY TRANSSPHENOIDAL APPROACH N/A  "9/26/2018    Procedure: RESECTION, NEOPLASM, PITUITARY, TRANSSPHENOIDAL APPROACH;  Surgeon: Racquel Mcadams MD;  [place] [facility] [place] [address] [place] [address];  Service: Neurosurgery;  Laterality: N/A;  WITH POSSIBLE ABDOMINAL FAT GRAFT/1.5HOUR/23HOUR STAY/TYPE/HOLD 2 UNITS/C-ARM/SUPINE/CO-SURGEON DR. LORNA GALEANA     Family History     Problem Relation (Age of Onset)    Breast cancer Maternal Aunt    Cancer Father    Cataracts Mother    Heart attack Mother    Heart disease Mother        Social History     Socioeconomic History    Marital status: Single     Spouse name: Not on file    Number of children: Not on file    Years of education: Not on file    Highest education level: Not on file   Occupational History    Not on file   Social Needs    Financial resource strain: Not on file    Food insecurity:     Worry: Not on file     Inability: Not on file    Transportation needs:     Medical: Not on file     Non-medical: Not on file   Tobacco Use    Smoking status: Never Smoker    Smokeless tobacco: Never Used   Substance and Sexual Activity    Alcohol use: Yes     Alcohol/week: 1.0 standard drinks     Types: 1 Glasses of wine per week    Drug use: No    Sexual activity: Never   Lifestyle    Physical activity:     Days per week: Not on file     Minutes per session: Not on file    Stress: Not on file   Relationships    Social connections:     Talks on phone: Not on file     Gets together: Not on file     Attends Synagogue service: Not on file     Active member of club or organization: Not on file     Attends meetings of clubs or organizations: Not on file     Relationship status: Not on file   Other Topics Concern    Not on file   Social History Narrative    Not on file       Review of Systems:  Review of Systems    OBJECTIVE:     Vital Signs  Temp: 98 °F (36.7 °C)  Pulse: 71  BP: (!) 156/80  Pain Score: 0-No pain  Height: 5' 5" (165.1 cm)  Weight: 89.6 kg (197 lb 8 oz)  Body mass index is 32.87 kg/m².    Physical " Exam:  Physical Exam:  Vitals reviewed.    Constitutional: She appears well-developed and well-nourished. No distress.     Eyes: Pupils are equal, round, and reactive to light. Conjunctivae and EOM are normal.     Cardiovascular: Normal rate, regular rhythm, normal pulses and no edema.     Abdominal: Soft. Bowel sounds are normal.     Skin: Skin displays no rash on trunk and no rash on extremities. Skin displays no lesions on trunk and no lesions on extremities.     Psych/Behavior: She is alert. She is oriented to person, place, and time. She has a normal mood and affect.     Musculoskeletal: Gait is normal.        Neck: Range of motion is full. There is no tenderness. Muscle strength is 5/5. Tone is normal.        Back: Range of motion is full. There is no tenderness. Muscle strength is 5/5. Tone is normal.        Right Upper Extremities: Range of motion is full. There is no tenderness. Muscle strength is 5/5. Tone is normal.        Left Upper Extremities: Range of motion is full. There is no tenderness. Muscle strength is 5/5. Tone is normal.       Right Lower Extremities: Range of motion is full. There is no tenderness. Muscle strength is 5/5. Tone is normal.        Left Lower Extremities: Range of motion is full. There is no tenderness. Muscle strength is 5/5. Tone is normal.     Neurological:        Coordination: She has a normal Romberg Test, normal finger to nose coordination and normal tandem walking coordination.        Sensory: There is no sensory deficit in the trunk. There is no sensory deficit in the extremities.        DTRs: DTRs are DTRS NORMAL AND SYMMETRICnormal and symmetric. She displays no Babinski's sign on the right side. She displays no Babinski's sign on the left side.        Cranial nerves: Cranial nerve(s) II, III, IV, V, VI, VII, VIII, IX, X, XI and XII are intact.     Visual fields full to confrontation.    Diagnostic Results:  She has an MRI with without contrast of the brain available  for review which I personally reviewed.  This redemonstrates residual pituitary adenoma in the left cavernous sinus and extending into the left middle fossa.  When compared with her previous MRI this has slightly increased in size.  Previously, the mass measured 2.2 x 2.2 x 2 cm.  It now measures 2.5 x 2.6 x 2.5 cm.  The pituitary stalk is slightly deviated to the right.  There does not appear to be any significant mass effect on the optic chiasm or tracts.    ASSESSMENT/PLAN:     Ms. Forrester is a 64-year-old female status post transnasal transsphenoidal resection of pituitary adenoma.  She had known residual in the left cavernous sinus.  When compared with previous studies her new MRI with and without contrast of the brain shows that this residual has increased slightly in size as described above.  There is no significant mass effect on surrounding structures.  I believe that we will likely have to treat this residual in the near future.  I have recommended stereotactic radiosurgery.  The patient wishes to follow conservatively for another short period of time.  Since she is asymptomatic I believe this is reasonable.  I have recommended a 3 month follow-up with MRI with without contrast pituitary protocol at that time.  If the lesion is stable, we will continue to follow-up every 3 months.  If there is any increased size on the updated MRI with without contrast of the brain in 3 months I will refer her to Dr. Cobb for stereotactic radiosurgery.  Patient knows she can call with any questions or concerns in the meantime.        Note dictated with voice recognition software, please excuse any grammatical errors.

## 2020-01-28 ENCOUNTER — PATIENT MESSAGE (OUTPATIENT)
Dept: OPHTHALMOLOGY | Facility: CLINIC | Age: 65
End: 2020-01-28

## 2020-03-06 ENCOUNTER — OFFICE VISIT (OUTPATIENT)
Dept: OPHTHALMOLOGY | Facility: CLINIC | Age: 65
End: 2020-03-06
Payer: MEDICARE

## 2020-03-06 DIAGNOSIS — E11.9 TYPE 2 DIABETES MELLITUS WITHOUT RETINOPATHY: ICD-10-CM

## 2020-03-06 DIAGNOSIS — D35.2 PITUITARY MACROADENOMA: ICD-10-CM

## 2020-03-06 DIAGNOSIS — H46.8 COMPRESSIVE OPTIC NEUROPATHY: Primary | ICD-10-CM

## 2020-03-06 DIAGNOSIS — H40.013 OPEN ANGLE WITH BORDERLINE FINDINGS OF BOTH EYES: ICD-10-CM

## 2020-03-06 PROCEDURE — 92133 CPTRZD OPH DX IMG PST SGM ON: CPT | Mod: S$GLB,,, | Performed by: OPTOMETRIST

## 2020-03-06 PROCEDURE — 99999 PR PBB SHADOW E&M-EST. PATIENT-LVL I: CPT | Mod: PBBFAC,,, | Performed by: OPTOMETRIST

## 2020-03-06 PROCEDURE — 99999 PR PBB SHADOW E&M-EST. PATIENT-LVL I: ICD-10-PCS | Mod: PBBFAC,,, | Performed by: OPTOMETRIST

## 2020-03-06 PROCEDURE — 92083 EXTENDED VISUAL FIELD XM: CPT | Mod: S$GLB,,, | Performed by: OPTOMETRIST

## 2020-03-06 PROCEDURE — 92133 POSTERIOR SEGMENT OCT OPTIC NERVE(OCULAR COHERENCE TOMOGRAPHY) - OU - BOTH EYES: ICD-10-PCS | Mod: S$GLB,,, | Performed by: OPTOMETRIST

## 2020-03-06 PROCEDURE — 92014 PR EYE EXAM, EST PATIENT,COMPREHESV: ICD-10-PCS | Mod: S$GLB,,, | Performed by: OPTOMETRIST

## 2020-03-06 PROCEDURE — 92014 COMPRE OPH EXAM EST PT 1/>: CPT | Mod: S$GLB,,, | Performed by: OPTOMETRIST

## 2020-03-06 PROCEDURE — 92083 HUMPHREY VISUAL FIELD - OU - BOTH EYES: ICD-10-PCS | Mod: S$GLB,,, | Performed by: OPTOMETRIST

## 2020-03-09 NOTE — PROGRESS NOTES
Gurvinder Schultz had rota-stent RCA last week. Feels better: less chest discomfort, but not  Asymptomatic yet.        Pneumonia, dyspnea 4/2017.   A 10-point review of systems was performed and was negative except for the positives described.      EKG, 7/12/2017, show HPI     PT was last seen on 5/20/19 with DNL for dilated eye exam and repeat   24-2VF and gOCT. PT was told to rtc 1 yr for dilated eye exam, 24-2VF and   gOCT   Pituitary macroadenoma-s/p TSS 9/26/18  Visual field scotoma of right eye  Medication eye drops if any: none  Last HVF: 3/6/20  Last gOCT: 3/6/20  Last SDP: none  HPI    Any vision changes since last exam: No  Eye pain: No  Other ocular symptoms: No, using otc tears prn    Do you wear currently wear glasses or contacts? Glasses    Interested in contacts today? No    Do you plan on getting new glasses today? Yes, if needed           Last edited by Michelle Simental on 3/6/2020  2:20 PM. (History)            Assessment /Plan     For exam results, see Encounter Report.    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   Mild progression noted OD on pattern deviation plot compared to previous (5/20/2019) but not as significant of a defect as seen prior to TSS  gOCT shows mild progression on ganglion cell layer plot OD compared to previous studies  Continue care as planned with neurosurgical team  RTC PRN     Pituitary macroadenoma  -     Hill Visual Field - OU - Extended - Both Eyes  -     Posterior Segment OCT Optic Nerve- Both eyes    Open angle with borderline findings of both eyes  -     Hill Visual Field - OU - Extended - Both Eyes  -     Posterior Segment OCT Optic Nerve- Both eyes  Stable IOP OD, OS  Monitor 4-6 months    Type 2 diabetes mellitus without retinopathy  No diabetic retinopathy in either eye  Continue close care with PCP   Monitor 12 months        RTC 4-6 months for 24-2VF, gOCT and IOP check or PRN sooner as requested by neurosurg   Discussed above and all questions were answered.                    lungs 2 (two) times daily as needed. Disp:  Rfl:    ergocalciferol 65520 units Oral Cap Take 1 capsule (50,000 Units total) by mouth twice a week.  (Patient taking differently: Take 50,000 Units by mouth twice a week.  ) Disp: 24 capsule Rfl: 1   Albutero 97%   BMI 33.64 kg/m²   HEENT:  No xanthalasma. No trauma  Chest: Clear to ausculatation  CV:     No JVD. Carotid upstrokes normal, no bruit. Regular S1 S2. No  paola. No murmur. Abdomen:  Soft, non-tender. No hepatosplenomegaly.     Extremities: No l

## 2020-04-21 DIAGNOSIS — D35.2 PITUITARY MACROADENOMA: Primary | ICD-10-CM

## 2020-04-24 ENCOUNTER — TELEPHONE (OUTPATIENT)
Dept: NEUROSURGERY | Facility: CLINIC | Age: 65
End: 2020-04-24

## 2020-04-24 DIAGNOSIS — D35.2 PITUITARY MACROADENOMA: Primary | ICD-10-CM

## 2020-04-24 NOTE — TELEPHONE ENCOUNTER
----- Message from Yamilet Ramirez RT sent at 4/24/2020  7:20 AM CDT -----  Regarding: MRI Labs  Can someone please schedule and order asap creatinine one hour before Ms Forrester's MRI appointment on Tuesday the 28th at 10:45. Thanks

## 2020-04-27 ENCOUNTER — TELEPHONE (OUTPATIENT)
Dept: RADIOLOGY | Facility: HOSPITAL | Age: 65
End: 2020-04-27

## 2020-04-28 ENCOUNTER — OFFICE VISIT (OUTPATIENT)
Dept: NEUROSURGERY | Facility: CLINIC | Age: 65
End: 2020-04-28
Payer: MEDICARE

## 2020-04-28 ENCOUNTER — HOSPITAL ENCOUNTER (OUTPATIENT)
Dept: RADIOLOGY | Facility: HOSPITAL | Age: 65
Discharge: HOME | End: 2020-04-28
Attending: NEUROLOGICAL SURGERY
Payer: MEDICARE

## 2020-04-28 DIAGNOSIS — D35.2 PITUITARY MACROADENOMA: ICD-10-CM

## 2020-04-28 DIAGNOSIS — D49.7 PITUITARY TUMOR: ICD-10-CM

## 2020-04-28 DIAGNOSIS — G44.209 TENSION HEADACHE: ICD-10-CM

## 2020-04-28 DIAGNOSIS — D35.2 PITUITARY MACROADENOMA: Primary | ICD-10-CM

## 2020-04-28 PROCEDURE — 25500020 PHARM REV CODE 255: Performed by: NEUROLOGICAL SURGERY

## 2020-04-28 PROCEDURE — 1101F PT FALLS ASSESS-DOCD LE1/YR: CPT | Mod: CPTII,,, | Performed by: PHYSICIAN ASSISTANT

## 2020-04-28 PROCEDURE — 99213 PR OFFICE/OUTPT VISIT, EST, LEVL III, 20-29 MIN: ICD-10-PCS | Mod: 95,,, | Performed by: PHYSICIAN ASSISTANT

## 2020-04-28 PROCEDURE — 99213 OFFICE O/P EST LOW 20 MIN: CPT | Mod: 95,,, | Performed by: PHYSICIAN ASSISTANT

## 2020-04-28 PROCEDURE — 70553 MRI BRAIN STEM W/O & W/DYE: CPT | Mod: 26,,, | Performed by: RADIOLOGY

## 2020-04-28 PROCEDURE — 70553 MRI BRAIN STEM W/O & W/DYE: CPT | Mod: TC

## 2020-04-28 PROCEDURE — A9585 GADOBUTROL INJECTION: HCPCS | Performed by: NEUROLOGICAL SURGERY

## 2020-04-28 PROCEDURE — 1101F PR PT FALLS ASSESS DOC 0-1 FALLS W/OUT INJ PAST YR: ICD-10-PCS | Mod: CPTII,,, | Performed by: PHYSICIAN ASSISTANT

## 2020-04-28 PROCEDURE — 70553 MRI PITUITARY W W/O CONTRAST: ICD-10-PCS | Mod: 26,,, | Performed by: RADIOLOGY

## 2020-04-28 RX ORDER — GADOBUTROL 604.72 MG/ML
9 INJECTION INTRAVENOUS
Status: COMPLETED | OUTPATIENT
Start: 2020-04-28 | End: 2020-04-28

## 2020-04-28 RX ADMIN — GADOBUTROL 9 ML: 604.72 INJECTION INTRAVENOUS at 11:04

## 2020-04-28 NOTE — PROGRESS NOTES
[facility]  Neurosurgery  History & Physical    Patient Name: Juliana Forrester  MRN: [id number]  Primary Care Provider: Maribel Pratt MD      Subjective:     The patient location is: home  The chief complaint leading to consultation is: 3 month follow up of pituitary adenoma  Visit type: audiovisual  Total time spent with patient: 12 minutes  Each patient to whom he or she provides medical services by telemedicine is:  (1) informed of the relationship between the physician and patient and the respective role of any other health care provider with respect to management of the patient; and (2) notified that he or she may decline to receive medical services by telemedicine and may withdraw from such care at any time.      History of Present Illness: Juliana Forrester is a 64 y.o. female with a history of pituitary macroadenoma s/p TNTS in 9/26/ 2018. She originally presented in 2018 with a right visual field cut, and was found to have a pituitary macroadenoma and underwent resection. Postoperatively, the patient's peripheral visual field cut in her right eye was back to baseline. She had known residual tumor in the left cavernous sinus. She has been followed for sometime by Dr. Mcadams with repeat MRIs every 3 months. Her MRI from 1/20/20 demonstrated that the residual tumor had increased slightly in size with slight pituitary stalk deviation to the right. There was no significant mass effect noted on the surrounding structures. It was recommended that the patient obtain a 3 month follow-up MRI with/without contrast pituitary protocol to monitor the size of the residual tumor. She is being seen today to discuss the results of the repeat 3 month MRI. The patient states that she continues to do well and has been asymptomatic. She denies any visual changes. She last saw optometry on 3/6/2020 for formal visual field testing. Findings relatively stable from optometry exam on 5/2019, however minor progression on  the right pattern deviation plot compared to previous (5/20/2019) but not as significant of a defect as seen prior to TNTS in 2018. The patient reports having intermittent headaches that started 2 days ago in the bilateral temporal regions that she attributes to stress. She has taken Tylenol with complete relief obtained. She denies any headache currently. She denies weakness or paresthesias. She has no other complaints at this time.          (Not in a hospital admission)    Review of patient's allergies indicates:  No Known Allergies    Past Medical History:   Diagnosis Date    Hypertension     Pituitary adenoma 9/12/2018     Past Surgical History:   Procedure Laterality Date    CHOLECYSTECTOMY  2016    SURGICAL REMOVAL OF PITUITARY TUMOR BY TRANSSPHENOIDAL APPROACH N/A 9/26/2018    Procedure: RESECTION, NEOPLASM, PITUITARY, TRANSSPHENOIDAL APPROACH;  Surgeon: Racquel Mcadams MD;  Location: [facility] OR [address] [place] [address];  Service: Neurosurgery;  Laterality: N/A;  WITH POSSIBLE ABDOMINAL FAT GRAFT/1.5HOUR/23HOUR STAY/TYPE/HOLD 2 UNITS/C-ARM/SUPINE/CO-SURGEON DR. LORNA GALEANA     Family History     Problem Relation (Age of Onset)    Breast cancer Maternal Aunt    Cancer Father    Cataracts Mother    Heart attack Mother    Heart disease Mother        Tobacco Use    Smoking status: Never Smoker    Smokeless tobacco: Never Used   Substance and Sexual Activity    Alcohol use: Yes     Alcohol/week: 1.0 standard drinks     Types: 1 Glasses of wine per week    Drug use: No    Sexual activity: Never     Review of Systems   Constitutional: Negative for activity change, appetite change and fever.   Eyes: Negative for pain and visual disturbance.   Respiratory: Negative for cough, chest tightness and shortness of breath.    Cardiovascular: Negative for chest pain.   Gastrointestinal: Negative for abdominal pain.   Musculoskeletal: Negative for myalgias.   Skin: Negative for color change and wound.   Neurological: Positive for  headaches. Negative for dizziness, weakness and numbness.   Hematological: Does not bruise/bleed easily.   Psychiatric/Behavioral: Negative for behavioral problems, confusion and dysphoric mood.     Objective:                  Neurosurgery Physical Exam  General: well developed, well nourished, no distress.   Head: normocephalic  Neurologic: Alert and oriented. Thought content appropriate.  GCS: Motor: 6/Verbal: 5/Eyes: 4 GCS Total: 15  Mental Status: Awake, Alert, Oriented x 4  Language: No aphasia  Speech: No dysarthria  Cranial nerves: CN II-XII grossly intact.   Eyes: EOMI appear grossly intact  Pulmonary: no signs of respiratory distress  Motor Strength:Moves all extremities spontaneously with good tone.  Finger-to-nose: intact bilaterally       Significant Diagnostics:  The MRI with/without contrast of the brain was independently reviewed by myself and Dr. Campos, along with the associated radiology report. The scan demonstrates residual pituitary macroadenoma in the left cavernous sinus extending into the left middle fossa. When compared to her previous MRI, this residual tumor appears roughly stable to minimal increase in size. Previously the mass measured 2.5x 2.6x 2.5 cm with the pituitary stalk deviated to the right. It now measures 2.7 cm x 2.6 cm x 2.4 cm. The mass closely approximates the left optic nerve with mild asymmetric elevation of the left optic chiasm region without significant mass effect.       Assessment/Plan:   Juliana Forrester is a 64 y.o. female with a history of pituitary macroadenoma s/p TNTS in 9/26/ 2018. She had known residual in the left cavernous sinus. Her MRI from 1/20/20 demonstrated that the residual tumor had increased slightly in size with slight pituitary stalk deviation to the right. There was no significant mass effect noted on the surrounding structures. It was recommended that the patient have a repeat scan in 3 months. When compared with her previous MRI from 1/20/20,  the new MRI from 4/28/20 demonstrates a roughly stable to minimal increase in size. The patient wishes to continue to conservatively follow the progression of the tumor as she currently remains asymptomatic. We will likely need to treat this residual in the near future with radiation. I have recommended a 3 month follow-up MRI wit/without contrast pituitary protocol at that time. If there is any increase in size or neurological changes in the meantime, the patient will be referred to Dr. Cobb for consideration for radiation therapy. She is set to see her optometrist again in the next 4 months. She knows to notify the Neurosurgery clinic if she has any new or developing neurological changes. In regards to her headaches, I have instructed the patient to call the clinic in a week if her headaches persist or are not relieved by OTC medications. If she develops new or worsening symptoms she is to call the clinic immediately. She verbalized understanding. All questions and concerns were addressed. Patient knows she can call with any questions or concerns in the meantime.       JOSE R Poon-DOMINICK  Neurosurgery  [facility]

## 2020-07-20 ENCOUNTER — TELEPHONE (OUTPATIENT)
Dept: NEUROSURGERY | Facility: CLINIC | Age: 65
End: 2020-07-20

## 2020-07-20 DIAGNOSIS — D35.2 PITUITARY ADENOMA: Primary | ICD-10-CM

## 2020-07-22 ENCOUNTER — TELEPHONE (OUTPATIENT)
Dept: RADIOLOGY | Facility: HOSPITAL | Age: 65
End: 2020-07-22

## 2020-07-23 ENCOUNTER — HOSPITAL ENCOUNTER (OUTPATIENT)
Dept: RADIOLOGY | Facility: HOSPITAL | Age: 65
Discharge: HOME | End: 2020-07-23
Attending: PHYSICIAN ASSISTANT
Payer: MEDICARE

## 2020-07-23 DIAGNOSIS — D35.2 PITUITARY MACROADENOMA: ICD-10-CM

## 2020-07-23 PROCEDURE — 70553 MRI PITUITARY W W/O CONTRAST: ICD-10-PCS | Mod: 26,,, | Performed by: RADIOLOGY

## 2020-07-23 PROCEDURE — 25500020 PHARM REV CODE 255: Performed by: PHYSICIAN ASSISTANT

## 2020-07-23 PROCEDURE — 70553 MRI BRAIN STEM W/O & W/DYE: CPT | Mod: 26,,, | Performed by: RADIOLOGY

## 2020-07-23 PROCEDURE — A9585 GADOBUTROL INJECTION: HCPCS | Performed by: PHYSICIAN ASSISTANT

## 2020-07-23 PROCEDURE — 70553 MRI BRAIN STEM W/O & W/DYE: CPT | Mod: TC

## 2020-07-23 RX ORDER — GADOBUTROL 604.72 MG/ML
9 INJECTION INTRAVENOUS
Status: COMPLETED | OUTPATIENT
Start: 2020-07-23 | End: 2020-07-23

## 2020-07-23 RX ADMIN — GADOBUTROL 9 ML: 604.72 INJECTION INTRAVENOUS at 10:07

## 2020-07-24 NOTE — PROGRESS NOTES
"  Neurosurgery  Established Patient    SUBJECTIVE:     History of Present Illness: Juliana Forrester is a 64 y.o. female with a history of pituitary macroadenoma s/p TNTS in 9/26/ 2018. She originally presented in 2018 with a right visual field cut, and was found to have a pituitary macroadenoma and underwent resection. Postoperatively, the patient's peripheral visual field cut in her right eye was back to baseline. She had known residual tumor in the left cavernous sinus. She has been followed for sometime by Dr. Mcadams with repeat MRIs every 3 months. Over the last couple of clinic visits, her symptoms and residual tumor as seen on MRI have remained roughly stable. She saw Ophthalmology prior to her last visit and per their note "mild progression noted OD on pattern deviation plot compared to previous (5/20/2019) but not as significant of a defect as seen prior to TSS". Today she is here for 3 month follow up. She reports no new symptoms. Denies headache, vision changes, weakness, or paresthesias. She is scheduled to see her PCP for her annual appointment next week.     Review of patient's allergies indicates:  No Known Allergies    Current Outpatient Medications   Medication Sig Dispense Refill    buPROPion (WELLBUTRIN XL) 150 MG TB24 tablet       levothyroxine (SYNTHROID) 50 MCG tablet TAKE 1 TABLET BY MOUTH EVERY DAY 30 tablet 11    lisinopril-hydrochlorothiazide (PRINZIDE,ZESTORETIC) 10-12.5 mg per tablet Take 1 tablet by mouth once daily. Take 1 tablet every other day  2    LORazepam (ATIVAN) 0.5 MG tablet every 12 (twelve) hours as needed.       metFORMIN (GLUCOPHAGE-XR) 500 MG 24 hr tablet Take 500 mg by mouth once daily.       multivitamin capsule Take 1 capsule by mouth.      omega-3s-dha-epa-fish oil (FISH OIL) 720-1,200 mg Cap       simvastatin (ZOCOR) 20 MG tablet        No current facility-administered medications for this visit.        Past Medical History:   Diagnosis Date    Hypertension     " Pituitary adenoma 9/12/2018     Past Surgical History:   Procedure Laterality Date    CHOLECYSTECTOMY  2016    SURGICAL REMOVAL OF PITUITARY TUMOR BY TRANSSPHENOIDAL APPROACH N/A 9/26/2018    Procedure: RESECTION, NEOPLASM, PITUITARY, TRANSSPHENOIDAL APPROACH;  Surgeon: Racquel Mcadams MD;  Location: [facility] OR [address] [place] [address];  Service: Neurosurgery;  Laterality: N/A;  WITH POSSIBLE ABDOMINAL FAT GRAFT/1.5HOUR/23HOUR STAY/TYPE/HOLD 2 UNITS/C-ARM/SUPINE/CO-SURGEON DR. LORNA GALEANA     Family History     Problem Relation (Age of Onset)    Breast cancer Maternal Aunt    Cancer Father    Cataracts Mother    Heart attack Mother    Heart disease Mother        Social History     Socioeconomic History    Marital status: Single     Spouse name: Not on file    Number of children: Not on file    Years of education: Not on file    Highest education level: Not on file   Occupational History    Not on file   Social Needs    Financial resource strain: Not on file    Food insecurity     Worry: Not on file     Inability: Not on file    Transportation needs     Medical: Not on file     Non-medical: Not on file   Tobacco Use    Smoking status: Never Smoker    Smokeless tobacco: Never Used   Substance and Sexual Activity    Alcohol use: Yes     Alcohol/week: 1.0 standard drinks     Types: 1 Glasses of wine per week    Drug use: No    Sexual activity: Never   Lifestyle    Physical activity     Days per week: Not on file     Minutes per session: Not on file    Stress: Not on file   Relationships    Social connections     Talks on phone: Not on file     Gets together: Not on file     Attends Congregation service: Not on file     Active member of club or organization: Not on file     Attends meetings of clubs or organizations: Not on file     Relationship status: Not on file   Other Topics Concern    Not on file   Social History Narrative    Not on file       Review of Systems   Constitutional: Negative for activity change and fever.    HENT: Negative for ear pain and trouble swallowing.    Eyes: Negative for photophobia and visual disturbance.   Respiratory: Negative for shortness of breath and wheezing.    Cardiovascular: Negative for chest pain.   Gastrointestinal: Negative for abdominal pain, nausea and vomiting.   Genitourinary: Negative for dysuria and hematuria.   Musculoskeletal: Negative for back pain and neck pain.   Skin: Negative for wound.   Neurological: Negative for dizziness, seizures, weakness, numbness and headaches.   Psychiatric/Behavioral: Negative for confusion.       OBJECTIVE:     Vital Signs    There were no vitals filed for this visit.    Neurosurgery Physical Exam  General: well developed, well nourished, no distress.   Head: normocephalic  Neurologic: Alert and oriented. Thought content appropriate.  GCS: Motor: 6/Verbal: 5/Eyes: 4 GCS Total: 15  Mental Status: Awake, Alert, Oriented x 4  Language: No aphasia  Speech: No dysarthria  Cranial nerves: CN II-XII grossly intact.   Eyes: EOMI appear grossly intact  Pulmonary: no signs of respiratory distress  Motor Strength:Moves all extremities spontaneously with good tone. BUE and BLE are at least 3/5. No abnormal movements seen.   Finger-to-nose: intact bilaterally   Pronator drift: absent bilaterally      Diagnostic Results:  MRI with pituitary protocol reviewed personally by me shows stable appearance of residual tumor when compared to MRI from 4/28.    ASSESSMENT/PLAN:       Juliana Forrester is a 64 y.o. female with a history of pituitary macroadenoma s/p TNTS in 9/26/ 2018. She has been doing well post-operatively. Serial MRIs every 3 months have showed roughly stable appearance of the residual tumor. I will have her follow up with her ophthalmologist in 6 months for repeat visual field testing. She can also follow up with us in 6 months with a repeat MRI w/ and w/out with pituitary protocol. She was educated on return precautions including changes in vision,  intractable headache, weakness, paresthesias, or bowel/bladder issues. She voiced understanding.She knows she can call the clinic in the meantime with any questions or concerns.

## 2020-07-28 ENCOUNTER — OFFICE VISIT (OUTPATIENT)
Dept: NEUROSURGERY | Facility: CLINIC | Age: 65
End: 2020-07-28
Payer: MEDICARE

## 2020-07-28 DIAGNOSIS — D35.2 PITUITARY MACROADENOMA: Primary | ICD-10-CM

## 2020-07-28 PROCEDURE — 1101F PR PT FALLS ASSESS DOC 0-1 FALLS W/OUT INJ PAST YR: ICD-10-PCS | Mod: CPTII,,, | Performed by: PHYSICIAN ASSISTANT

## 2020-07-28 PROCEDURE — 99213 OFFICE O/P EST LOW 20 MIN: CPT | Mod: 95,,, | Performed by: PHYSICIAN ASSISTANT

## 2020-07-28 PROCEDURE — 1101F PT FALLS ASSESS-DOCD LE1/YR: CPT | Mod: CPTII,,, | Performed by: PHYSICIAN ASSISTANT

## 2020-07-28 PROCEDURE — 99213 PR OFFICE/OUTPT VISIT, EST, LEVL III, 20-29 MIN: ICD-10-PCS | Mod: 95,,, | Performed by: PHYSICIAN ASSISTANT

## 2020-09-16 ENCOUNTER — OFFICE VISIT (OUTPATIENT)
Dept: OPHTHALMOLOGY | Facility: CLINIC | Age: 65
End: 2020-09-16
Payer: MEDICARE

## 2020-09-16 DIAGNOSIS — H46.8 COMPRESSIVE OPTIC NEUROPATHY: Primary | ICD-10-CM

## 2020-09-16 DIAGNOSIS — D35.2 PITUITARY MACROADENOMA: ICD-10-CM

## 2020-09-16 DIAGNOSIS — H40.013 OPEN ANGLE WITH BORDERLINE FINDINGS OF BOTH EYES: ICD-10-CM

## 2020-09-16 PROCEDURE — 92012 INTRM OPH EXAM EST PATIENT: CPT | Mod: S$GLB,,, | Performed by: OPTOMETRIST

## 2020-09-16 PROCEDURE — 92083 EXTENDED VISUAL FIELD XM: CPT | Mod: S$GLB,,, | Performed by: OPTOMETRIST

## 2020-09-16 PROCEDURE — 92133 POSTERIOR SEGMENT OCT OPTIC NERVE(OCULAR COHERENCE TOMOGRAPHY) - OU - BOTH EYES: ICD-10-PCS | Mod: S$GLB,,, | Performed by: OPTOMETRIST

## 2020-09-16 PROCEDURE — 99999 PR PBB SHADOW E&M-EST. PATIENT-LVL II: CPT | Mod: PBBFAC,,, | Performed by: OPTOMETRIST

## 2020-09-16 PROCEDURE — 92083 HUMPHREY VISUAL FIELD - OU - BOTH EYES: ICD-10-PCS | Mod: S$GLB,,, | Performed by: OPTOMETRIST

## 2020-09-16 PROCEDURE — 92012 PR EYE EXAM, EST PATIENT,INTERMED: ICD-10-PCS | Mod: S$GLB,,, | Performed by: OPTOMETRIST

## 2020-09-16 PROCEDURE — 99999 PR PBB SHADOW E&M-EST. PATIENT-LVL II: ICD-10-PCS | Mod: PBBFAC,,, | Performed by: OPTOMETRIST

## 2020-09-16 PROCEDURE — 92133 CPTRZD OPH DX IMG PST SGM ON: CPT | Mod: S$GLB,,, | Performed by: OPTOMETRIST

## 2020-09-16 NOTE — PROGRESS NOTES
HPI     PT was last seen on 3/6/2020 with DNL for DFE, HVF and gOCT. PT was told   to RTC  4-6 months for 24-2VF, gOCT and IOP.  Medication eye drops if any: Obi  Last HVF: 9/16/2020  Last gOCT: 9/16/2020  Last SDPs:None      Last edited by Arianna Hunt, PCT on 9/16/2020 10:18 AM. (History)              Assessment /Plan     For exam results, see Encounter Report.    Compressive optic neuropathy  -     Hill Visual Field - OU - Extended - Both Eyes  -     Posterior Segment OCT Optic Nerve- Both eyes    Pituitary macroadenoma  -     Hill Visual Field - OU - Extended - Both Eyes  -     Posterior Segment OCT Optic Nerve- Both eyes    Open angle with borderline findings of both eyes  -     Hlil Visual Field - OU - Extended - Both Eyes  -     Posterior Segment OCT Optic Nerve- Both eyes      VF today: OD: improved superior temporal quadrant compared to previous, new today nasal step type defect but could be artifact given stable gOCT       OS: no visual field defects    GOCT: stable NFL OD, OS; GCL stable as well OD with no progression compared to 3/2020 scan    IOP normal and stable in both eyes today    Monitor 6 months    RTC 6 months for dilated exam with 24-2VF and gOCT or PRN  Discussed above and all questions were answered.

## 2020-11-12 ENCOUNTER — OFFICE VISIT (OUTPATIENT)
Dept: ENDOCRINOLOGY | Facility: CLINIC | Age: 65
End: 2020-11-12
Payer: MEDICARE

## 2020-11-12 DIAGNOSIS — D35.2 PITUITARY MACROADENOMA: ICD-10-CM

## 2020-11-12 DIAGNOSIS — E03.9 HYPOTHYROIDISM, UNSPECIFIED TYPE: ICD-10-CM

## 2020-11-12 PROCEDURE — 99213 OFFICE O/P EST LOW 20 MIN: CPT | Mod: 95,,, | Performed by: INTERNAL MEDICINE

## 2020-11-12 PROCEDURE — 3072F LOW RISK FOR RETINOPATHY: CPT | Mod: ,,, | Performed by: INTERNAL MEDICINE

## 2020-11-12 PROCEDURE — 99213 PR OFFICE/OUTPT VISIT, EST, LEVL III, 20-29 MIN: ICD-10-PCS | Mod: 95,,, | Performed by: INTERNAL MEDICINE

## 2020-11-12 PROCEDURE — 3072F PR LOW RISK FOR RETINOPATHY: ICD-10-PCS | Mod: ,,, | Performed by: INTERNAL MEDICINE

## 2020-11-12 NOTE — PROGRESS NOTES
"ENDOCRINOLOGY RETURN VISIT  11/12/2019    Juliana Forrester is a 64 y.o. female presenting for follow-up of pituitary macroadenoma s/p TSS 9/26/18, secondary hypothyroidism    HPI    Hx:  During annual eye exam she was noted to have right visual field deficit which prompted MRI. This revealed a 3.7 cm sellar mass with suprasellar extension abutting the chiasm. She notes feeling "claustrophobic" while driving recently but otherwise had not previously noted change in vision.     Initial hormonal evaluation normal with exception of mildly elevated prolactin    Underwent uncomplicated TSS on 9/26/18 with Dr. Mcadams but still has significant sized residual tumor extending into L cavernous sinus    Path: Gonadotroph adenoma, immunopositive for SF1 and focal alpha-subunit    Interval History  No new medical problems or complaints since visit 1 year ago.    No HA or vision change.  Sees Dr. Glass (ophtho in [place]), 9/2020 VF normal (much improved from baseline) f/u in 1 year.    She continues to be followed by Dr. Mcadams with last MRI in 7/2020 w/ stable residual tumor.  They have discussed the possibility of radiosurgery and will repeat MRI q6 mo for now.      No symptoms of DI (denies polyuria/polydipsia).    denies symptoms of adrenal insufficiency (no lightheadedness, N/V/abd pain, hypotension).      Imaging:  MRI pituitary 7/23/2020   Patient is status post prior trans-sphenoidal resection of pituitary tumor.  Large residual sellar/suprasellar mass on the left appears essentially unchanged in size measuring up to 2.8 cm craniocaudal by 2.8 cm transverse by 2.3 cm AP.  Invasion into the left cavernous sinus is again noted.  Mass effect on the cavernous segment of the left ICA appears similar to prior.  Rightward displacement of the pituitary infundibulum and superior displacement of the proximal left optic nerve and optic chiasm is unchanged       Regarding Secondary Hypothyroidism:  Remains on LT4 50 mcg daily. " Taking appropriately.  This was started around the time of TSS when she was found to have mildly low fT4 and normal TSH just prior to surgery.      Lab Results   Component Value Date    TSH 1.483 12/20/2019    FREET4 0.77 12/20/2019         MEDICATION    Current Outpatient Medications:     buPROPion (WELLBUTRIN XL) 150 MG TB24 tablet, , Disp: , Rfl:     levothyroxine (SYNTHROID) 50 MCG tablet, TAKE 1 TABLET BY MOUTH EVERY DAY, Disp: 30 tablet, Rfl: 11    lisinopril-hydrochlorothiazide (PRINZIDE,ZESTORETIC) 10-12.5 mg per tablet, Take 1 tablet by mouth once daily. Take 1 tablet every other day, Disp: , Rfl: 2    LORazepam (ATIVAN) 0.5 MG tablet, every 12 (twelve) hours as needed. , Disp: , Rfl:     metFORMIN (GLUCOPHAGE-XR) 500 MG 24 hr tablet, Take 500 mg by mouth once daily. , Disp: , Rfl:     multivitamin capsule, Take 1 capsule by mouth., Disp: , Rfl:     omega-3s-dha-epa-fish oil (FISH OIL) 720-1,200 mg Cap, , Disp: , Rfl:     simvastatin (ZOCOR) 20 MG tablet, , Disp: , Rfl:     ALLERGIES  Review of patient's allergies indicates:  No Known Allergies    FAMILY HISTORY  Denies family history of pituitary tumor  Sister with primary hyperparathyroidism  Father with liver or intestinal cancer?    SOCIAL HISTORY  Denies tobacco, ETOH, illicits  Speech pathologist, worked at the [employer] in [place]    PHYSICAL EXAM  There were no vitals taken for this visit.  There is no height or weight on file to calculate BMI.  General Appearance:  Normal appearing, pleasant, NAD  Skin:  no striae or rashes  HEENT:  EOMI, MMM, sclera anicteric   MSK: small DC fat pain, no SC fullness  Neurologic:  A&O x3  Psychiatric:  normal mood and affect    LABORATORY  9/18/18:  24 hr urine cortisol 31.0   Cortisol 13.58  ACTH 16  Prolactin 65.2  IGF-1 85  FT4 0.88  TSH 1.13  Sodium 142    Labs above reviewed      IMAGING STUDIES  MR orbits 8/28/18: before surgery  There is a large mass that demonstrates homogeneous enhancement  occupying the sella that demonstrates suprasellar extension and results in at least 360° of encasement of the cavernous portion of the left and 180 -270° of abutment of the supraclinoid portion of the left ICA.  The mass measures a maximum of 3.7 by 2.3 cm in the axial plane and up to 2.5 cm in the craniocaudal dimension.  The mass results in abutment and effacement of the optic chiasm.      MRI brain 4/8/19:  Slight decrease in the residual left-sided pituitary adenoma when compared to the most recent study of December 2018.  The lesion now measures approximately 2.2 x 2.2 x 2.0 cm. Invading L cavernous sinus   2. Stable postoperative changes from transsphenoidal resection of the rest of the pituitary adenoma.    MRI 07/23/2020:  No significant change in size, the mass is not compressing the optic chiasm but the left optic chiasm in the remains significant invasion to the left cavernous sinus.      ASSESSMENT/PLAN  64 y.o.F with HTN, T2DM and CKD who presents for follow-up of pituitary macroadenoma s/p TSS    Doing well following surgery with residual tumor with invasion into L cavernous sinus with some decrease in size.  Follows w/ Dr. Mcadams.       Problem List Items Addressed This Visit        1 - High    Pituitary macroadenoma     Last MRI 7/2020 with stable size and redemonstration of L cavernous sinus without impingement of optic apparatus.  No more visual deficit and HVF was normal in September 2020..  Will see Dr. Mcadams  w/ repeat MRI around February to look for interval change.  They have discussed the possibility of gamma knife radio surgery in the future.  Today we reviewed that gamma knife may results in new pituitary hormone deficits like adrenal insufficiency that may require replacement.  We reviewed signs and symptoms of adrenal insufficiency and she will let me know if she develops them in the future.    Today will check yearly labs.           Relevant Orders    Basic Metabolic Panel    Cortisol, 8AM     Follicle Stimulating Hormone    Prolactin    T4, Free    TSH       2     Hypothyroidism     Has been on levothyroxine 50 mcg since transsphenoidal resection and is clinically euthyroid.  Will check thyroid labs tomorrow and then based on levels refill levothyroxine.  Depending on labs, may consider trial off levothyroxine in the future however if normal TSH and free T4 will continue.         Relevant Orders    T4, Free    TSH        RTC in 1 year unless clinical change    Labs tomorrow 8 am at the [facility].      Tevin Ron MD

## 2020-11-12 NOTE — Clinical Note
Imaging:  MRI pituitary 7/23/2020   Patient is status post prior trans-sphenoidal resection of pituitary tumor.  Large residual sellar/suprasellar mass on the left appears essentially unchanged in size measuring up to 2.8 cm craniocaudal by 2.8 cm transverse by 2.3 cm AP.  Invasion into the left cavernous sinus is again noted.  Mass effect on the cavernous segment of the left ICA appears similar to prior.  Rightward displacement of the pituitary infundibulum and superior displacement of the proximal left optic nerve and optic chiasm is unchanged

## 2020-11-12 NOTE — ASSESSMENT & PLAN NOTE
Last MRI 7/2020 with stable size and redemonstration of L cavernous sinus without impingement of optic apparatus.  No more visual deficit and HVF was normal in September 2020..  Will see Dr. Abbe lloyd/ repeat MRI around February to look for interval change.  They have discussed the possibility of gamma knife radio surgery in the future.  Today we reviewed that gamma knife may results in new pituitary hormone deficits like adrenal insufficiency that may require replacement.  We reviewed signs and symptoms of adrenal insufficiency and she will let me know if she develops them in the future.    Today will check yearly labs.

## 2020-11-12 NOTE — PATIENT INSTRUCTIONS
Please let me know if you develop symptoms of low cortisol levels which would include getting lightheaded, having low blood pressure, nausea, vomiting, abdominal pain, unexpected weight loss.  If you experience any of the symptoms we would check your cortisol level right away.    Please also let me know if you will be proceeding with radiation or if you have any new vision changes or headaches.

## 2020-11-12 NOTE — ASSESSMENT & PLAN NOTE
Has been on levothyroxine 50 mcg since transsphenoidal resection and is clinically euthyroid.  Will check thyroid labs tomorrow and then based on levels refill levothyroxine.  Depending on labs, may consider trial off levothyroxine in the future however if normal TSH and free T4 will continue.

## 2020-11-13 ENCOUNTER — LAB VISIT (OUTPATIENT)
Dept: LAB | Facility: HOSPITAL | Age: 65
End: 2020-11-13
Attending: INTERNAL MEDICINE
Payer: MEDICARE

## 2020-11-13 DIAGNOSIS — D35.2 PITUITARY MACROADENOMA: ICD-10-CM

## 2020-11-13 DIAGNOSIS — E03.9 HYPOTHYROIDISM, UNSPECIFIED TYPE: ICD-10-CM

## 2020-11-13 LAB
ANION GAP SERPL CALC-SCNC: 8 MMOL/L (ref 8–16)
BUN SERPL-MCNC: 23 MG/DL (ref 8–23)
CALCIUM SERPL-MCNC: 10.3 MG/DL (ref 8.7–10.5)
CHLORIDE SERPL-SCNC: 107 MMOL/L (ref 95–110)
CO2 SERPL-SCNC: 28 MMOL/L (ref 23–29)
CORTIS SERPL-MCNC: 16.5 UG/DL (ref 4.3–22.4)
CREAT SERPL-MCNC: 1.4 MG/DL (ref 0.5–1.4)
EST. GFR  (AFRICAN AMERICAN): 45.5 ML/MIN/1.73 M^2
EST. GFR  (NON AFRICAN AMERICAN): 39.5 ML/MIN/1.73 M^2
FSH SERPL-ACNC: 12 MIU/ML
GLUCOSE SERPL-MCNC: 99 MG/DL (ref 70–110)
POTASSIUM SERPL-SCNC: 5.3 MMOL/L (ref 3.5–5.1)
PROLACTIN SERPL IA-MCNC: 19.5 NG/ML (ref 5.2–26.5)
SODIUM SERPL-SCNC: 143 MMOL/L (ref 136–145)
T4 FREE SERPL-MCNC: 0.9 NG/DL (ref 0.71–1.51)
TSH SERPL DL<=0.005 MIU/L-ACNC: 1.22 UIU/ML (ref 0.4–4)

## 2020-11-13 PROCEDURE — 84146 ASSAY OF PROLACTIN: CPT

## 2020-11-13 PROCEDURE — 82533 TOTAL CORTISOL: CPT

## 2020-11-13 PROCEDURE — 84443 ASSAY THYROID STIM HORMONE: CPT

## 2020-11-13 PROCEDURE — 80048 BASIC METABOLIC PNL TOTAL CA: CPT

## 2020-11-13 PROCEDURE — 36415 COLL VENOUS BLD VENIPUNCTURE: CPT

## 2020-11-13 PROCEDURE — 83001 ASSAY OF GONADOTROPIN (FSH): CPT

## 2020-11-13 PROCEDURE — 84439 ASSAY OF FREE THYROXINE: CPT

## 2020-11-16 ENCOUNTER — PATIENT MESSAGE (OUTPATIENT)
Dept: ENDOCRINOLOGY | Facility: CLINIC | Age: 65
End: 2020-11-16

## 2020-11-16 DIAGNOSIS — E87.5 SERUM POTASSIUM ELEVATED: Primary | ICD-10-CM

## 2020-11-16 RX ORDER — LEVOTHYROXINE SODIUM 50 UG/1
50 TABLET ORAL DAILY
Qty: 30 TABLET | Refills: 11 | Status: SHIPPED | OUTPATIENT
Start: 2020-11-16

## 2020-11-16 RX ORDER — LEVOTHYROXINE SODIUM 50 UG/1
TABLET ORAL
Qty: 90 TABLET | Refills: 3 | Status: SHIPPED | OUTPATIENT
Start: 2020-11-16 | End: 2022-01-18

## 2020-11-17 ENCOUNTER — LAB VISIT (OUTPATIENT)
Dept: LAB | Facility: HOSPITAL | Age: 65
End: 2020-11-17
Attending: INTERNAL MEDICINE
Payer: MEDICARE

## 2020-11-17 DIAGNOSIS — E87.5 SERUM POTASSIUM ELEVATED: ICD-10-CM

## 2020-11-17 LAB
ALBUMIN SERPL BCP-MCNC: 4.2 G/DL (ref 3.5–5.2)
ANION GAP SERPL CALC-SCNC: 11 MMOL/L (ref 8–16)
BUN SERPL-MCNC: 23 MG/DL (ref 8–23)
CALCIUM SERPL-MCNC: 9.8 MG/DL (ref 8.7–10.5)
CHLORIDE SERPL-SCNC: 108 MMOL/L (ref 95–110)
CO2 SERPL-SCNC: 24 MMOL/L (ref 23–29)
CREAT SERPL-MCNC: 1.3 MG/DL (ref 0.5–1.4)
EST. GFR  (AFRICAN AMERICAN): 49.7 ML/MIN/1.73 M^2
EST. GFR  (NON AFRICAN AMERICAN): 43.2 ML/MIN/1.73 M^2
GLUCOSE SERPL-MCNC: 100 MG/DL (ref 70–110)
PHOSPHATE SERPL-MCNC: 3.3 MG/DL (ref 2.7–4.5)
POTASSIUM SERPL-SCNC: 5.1 MMOL/L (ref 3.5–5.1)
SODIUM SERPL-SCNC: 143 MMOL/L (ref 136–145)

## 2020-11-17 PROCEDURE — 80069 RENAL FUNCTION PANEL: CPT

## 2020-11-17 PROCEDURE — 36415 COLL VENOUS BLD VENIPUNCTURE: CPT

## 2021-01-26 ENCOUNTER — TELEPHONE (OUTPATIENT)
Dept: NEUROSURGERY | Facility: CLINIC | Age: 66
End: 2021-01-26

## 2021-01-28 ENCOUNTER — TELEPHONE (OUTPATIENT)
Dept: NEUROSURGERY | Facility: CLINIC | Age: 66
End: 2021-01-28

## 2021-01-28 DIAGNOSIS — D49.7 PITUITARY TUMOR: ICD-10-CM

## 2021-01-28 DIAGNOSIS — Z01.812 PRE-PROCEDURE LAB EXAM: Primary | ICD-10-CM

## 2021-02-11 ENCOUNTER — TELEPHONE (OUTPATIENT)
Dept: NEUROSURGERY | Facility: CLINIC | Age: 66
End: 2021-02-11

## 2021-02-11 DIAGNOSIS — D49.7 PITUITARY TUMOR: ICD-10-CM

## 2021-02-11 DIAGNOSIS — Z01.812 PRE-PROCEDURE LAB EXAM: Primary | ICD-10-CM

## 2021-02-15 ENCOUNTER — PATIENT MESSAGE (OUTPATIENT)
Dept: NEUROSURGERY | Facility: CLINIC | Age: 66
End: 2021-02-15

## 2021-02-17 ENCOUNTER — PATIENT MESSAGE (OUTPATIENT)
Dept: NEUROSURGERY | Facility: CLINIC | Age: 66
End: 2021-02-17

## 2021-03-26 ENCOUNTER — OFFICE VISIT (OUTPATIENT)
Dept: OPHTHALMOLOGY | Facility: CLINIC | Age: 66
End: 2021-03-26
Payer: MEDICARE

## 2021-03-26 DIAGNOSIS — H40.013 OPEN ANGLE WITH BORDERLINE FINDINGS OF BOTH EYES: ICD-10-CM

## 2021-03-26 DIAGNOSIS — D35.2 PITUITARY MACROADENOMA: Primary | ICD-10-CM

## 2021-03-26 DIAGNOSIS — H52.203 ASTIGMATISM WITH PRESBYOPIA, BILATERAL: ICD-10-CM

## 2021-03-26 DIAGNOSIS — E11.9 TYPE 2 DIABETES MELLITUS WITHOUT RETINOPATHY: ICD-10-CM

## 2021-03-26 DIAGNOSIS — H46.8 COMPRESSIVE OPTIC NEUROPATHY: ICD-10-CM

## 2021-03-26 DIAGNOSIS — H52.4 ASTIGMATISM WITH PRESBYOPIA, BILATERAL: ICD-10-CM

## 2021-03-26 PROCEDURE — 92015 DETERMINE REFRACTIVE STATE: CPT | Mod: S$GLB,,, | Performed by: OPTOMETRIST

## 2021-03-26 PROCEDURE — 2023F DILAT RTA XM W/O RTNOPTHY: CPT | Mod: S$GLB,,, | Performed by: OPTOMETRIST

## 2021-03-26 PROCEDURE — 92014 COMPRE OPH EXAM EST PT 1/>: CPT | Mod: S$GLB,,, | Performed by: OPTOMETRIST

## 2021-03-26 PROCEDURE — 92083 EXTENDED VISUAL FIELD XM: CPT | Mod: S$GLB,,, | Performed by: OPTOMETRIST

## 2021-03-26 PROCEDURE — 92133 CPTRZD OPH DX IMG PST SGM ON: CPT | Mod: S$GLB,,, | Performed by: OPTOMETRIST

## 2021-03-26 PROCEDURE — 99999 PR PBB SHADOW E&M-EST. PATIENT-LVL II: CPT | Mod: PBBFAC,,, | Performed by: OPTOMETRIST

## 2021-03-26 PROCEDURE — 92014 PR EYE EXAM, EST PATIENT,COMPREHESV: ICD-10-PCS | Mod: S$GLB,,, | Performed by: OPTOMETRIST

## 2021-03-26 PROCEDURE — 92015 PR REFRACTION: ICD-10-PCS | Mod: S$GLB,,, | Performed by: OPTOMETRIST

## 2021-03-26 PROCEDURE — 92083 HUMPHREY VISUAL FIELD - OU - BOTH EYES: ICD-10-PCS | Mod: S$GLB,,, | Performed by: OPTOMETRIST

## 2021-03-26 PROCEDURE — 2023F PR DILATED RETINAL EXAM W/O EVID OF RETINOPATHY: ICD-10-PCS | Mod: S$GLB,,, | Performed by: OPTOMETRIST

## 2021-03-26 PROCEDURE — 92133 POSTERIOR SEGMENT OCT OPTIC NERVE(OCULAR COHERENCE TOMOGRAPHY) - OU - BOTH EYES: ICD-10-PCS | Mod: S$GLB,,, | Performed by: OPTOMETRIST

## 2021-03-26 PROCEDURE — 99999 PR PBB SHADOW E&M-EST. PATIENT-LVL II: ICD-10-PCS | Mod: PBBFAC,,, | Performed by: OPTOMETRIST

## 2021-04-05 ENCOUNTER — HOSPITAL ENCOUNTER (OUTPATIENT)
Dept: RADIOLOGY | Facility: HOSPITAL | Age: 66
Discharge: HOME | End: 2021-04-05
Attending: PHYSICIAN ASSISTANT
Payer: MEDICARE

## 2021-04-05 DIAGNOSIS — D35.2 PITUITARY MACROADENOMA: ICD-10-CM

## 2021-04-05 PROCEDURE — 25500020 PHARM REV CODE 255: Performed by: PHYSICIAN ASSISTANT

## 2021-04-05 PROCEDURE — A9585 GADOBUTROL INJECTION: HCPCS | Performed by: PHYSICIAN ASSISTANT

## 2021-04-05 PROCEDURE — 70553 MRI BRAIN STEM W/O & W/DYE: CPT | Mod: 26,,, | Performed by: RADIOLOGY

## 2021-04-05 PROCEDURE — 70553 MRI PITUITARY W W/O CONTRAST: ICD-10-PCS | Mod: 26,,, | Performed by: RADIOLOGY

## 2021-04-05 PROCEDURE — 70553 MRI BRAIN STEM W/O & W/DYE: CPT | Mod: TC

## 2021-04-05 RX ORDER — GADOBUTROL 604.72 MG/ML
5 INJECTION INTRAVENOUS
Status: COMPLETED | OUTPATIENT
Start: 2021-04-05 | End: 2021-04-05

## 2021-04-05 RX ADMIN — GADOBUTROL 5 ML: 604.72 INJECTION INTRAVENOUS at 12:04

## 2021-04-06 ENCOUNTER — PATIENT MESSAGE (OUTPATIENT)
Dept: NEUROSURGERY | Facility: CLINIC | Age: 66
End: 2021-04-06

## 2021-04-20 ENCOUNTER — PATIENT MESSAGE (OUTPATIENT)
Dept: NEUROSURGERY | Facility: CLINIC | Age: 66
End: 2021-04-20

## 2021-04-20 ENCOUNTER — TELEPHONE (OUTPATIENT)
Dept: NEUROSURGERY | Facility: CLINIC | Age: 66
End: 2021-04-20

## 2021-04-21 ENCOUNTER — OFFICE VISIT (OUTPATIENT)
Dept: NEUROSURGERY | Facility: CLINIC | Age: 66
End: 2021-04-21
Payer: MEDICARE

## 2021-04-21 DIAGNOSIS — D35.2 PITUITARY MACROADENOMA: Primary | ICD-10-CM

## 2021-04-21 PROCEDURE — 99214 OFFICE O/P EST MOD 30 MIN: CPT | Mod: 95,,, | Performed by: NEUROLOGICAL SURGERY

## 2021-04-21 PROCEDURE — 3072F LOW RISK FOR RETINOPATHY: CPT | Mod: ,,, | Performed by: NEUROLOGICAL SURGERY

## 2021-04-21 PROCEDURE — 1159F PR MEDICATION LIST DOCUMENTED IN MEDICAL RECORD: ICD-10-PCS | Mod: ,,, | Performed by: NEUROLOGICAL SURGERY

## 2021-04-21 PROCEDURE — 1159F MED LIST DOCD IN RCRD: CPT | Mod: ,,, | Performed by: NEUROLOGICAL SURGERY

## 2021-04-21 PROCEDURE — 3072F PR LOW RISK FOR RETINOPATHY: ICD-10-PCS | Mod: ,,, | Performed by: NEUROLOGICAL SURGERY

## 2021-04-21 PROCEDURE — 99214 PR OFFICE/OUTPT VISIT, EST, LEVL IV, 30-39 MIN: ICD-10-PCS | Mod: 95,,, | Performed by: NEUROLOGICAL SURGERY

## 2021-05-05 ENCOUNTER — OUTSIDE PLACE OF SERVICE (OUTPATIENT)
Dept: NEUROSURGERY | Facility: CLINIC | Age: 66
End: 2021-05-05
Payer: MEDICARE

## 2021-05-05 PROCEDURE — 61798 PR STEREOTACTIC RADIOSURGERY, CRANIAL,COMPLEX,SINGLE: ICD-10-PCS | Mod: ,,, | Performed by: NEUROLOGICAL SURGERY

## 2021-05-05 PROCEDURE — 61798 SRS CRANIAL LESION COMPLEX: CPT | Mod: ,,, | Performed by: NEUROLOGICAL SURGERY

## 2021-05-05 PROCEDURE — 61800 PR APPLY STEREOTACTIC HEADFRAME FOR RADIOSURGERY: ICD-10-PCS | Mod: ,,, | Performed by: NEUROLOGICAL SURGERY

## 2021-05-05 PROCEDURE — 61800 APPLY SRS HEADFRAME ADD-ON: CPT | Mod: ,,, | Performed by: NEUROLOGICAL SURGERY

## 2021-05-05 RX ORDER — METHYLPREDNISOLONE 4 MG/1
TABLET ORAL
Qty: 1 PACKAGE | Refills: 0 | Status: SHIPPED | OUTPATIENT
Start: 2021-05-05 | End: 2021-05-26

## 2021-06-07 ENCOUNTER — PATIENT MESSAGE (OUTPATIENT)
Dept: NEUROSURGERY | Facility: CLINIC | Age: 66
End: 2021-06-07

## 2021-06-08 ENCOUNTER — TELEPHONE (OUTPATIENT)
Dept: NEUROSURGERY | Facility: CLINIC | Age: 66
End: 2021-06-08

## 2021-06-08 DIAGNOSIS — D35.2 PITUITARY MACROADENOMA: Primary | ICD-10-CM

## 2021-08-03 ENCOUNTER — PATIENT MESSAGE (OUTPATIENT)
Dept: ENDOCRINOLOGY | Facility: CLINIC | Age: 66
End: 2021-08-03

## 2021-08-03 DIAGNOSIS — E03.9 HYPOTHYROIDISM, UNSPECIFIED TYPE: ICD-10-CM

## 2021-08-03 DIAGNOSIS — D35.2 PITUITARY MACROADENOMA: Primary | ICD-10-CM

## 2021-08-06 ENCOUNTER — TELEPHONE (OUTPATIENT)
Dept: NEUROSURGERY | Facility: CLINIC | Age: 66
End: 2021-08-06

## 2021-08-06 DIAGNOSIS — D49.7 PITUITARY TUMOR: Primary | ICD-10-CM

## 2021-08-09 ENCOUNTER — LAB VISIT (OUTPATIENT)
Dept: LAB | Facility: HOSPITAL | Age: 66
End: 2021-08-09
Attending: INTERNAL MEDICINE
Payer: MEDICARE

## 2021-08-09 DIAGNOSIS — D49.7 PITUITARY TUMOR: ICD-10-CM

## 2021-08-09 LAB
CREAT SERPL-MCNC: 1.3 MG/DL (ref 0.5–1.4)
EST. GFR  (AFRICAN AMERICAN): 49 ML/MIN/1.73 M^2
EST. GFR  (NON AFRICAN AMERICAN): 43 ML/MIN/1.73 M^2

## 2021-08-09 PROCEDURE — 82565 ASSAY OF CREATININE: CPT | Performed by: NEUROLOGICAL SURGERY

## 2021-08-09 PROCEDURE — 36415 COLL VENOUS BLD VENIPUNCTURE: CPT | Performed by: NEUROLOGICAL SURGERY

## 2021-08-11 ENCOUNTER — HOSPITAL ENCOUNTER (OUTPATIENT)
Dept: RADIOLOGY | Facility: HOSPITAL | Age: 66
Discharge: HOME | End: 2021-08-11
Attending: NEUROLOGICAL SURGERY
Payer: MEDICARE

## 2021-08-11 DIAGNOSIS — D35.2 PITUITARY MACROADENOMA: ICD-10-CM

## 2021-08-11 PROCEDURE — 70553 MRI BRAIN STEM W/O & W/DYE: CPT | Mod: 26,,, | Performed by: RADIOLOGY

## 2021-08-11 PROCEDURE — 25500020 PHARM REV CODE 255: Mod: PO | Performed by: NEUROLOGICAL SURGERY

## 2021-08-11 PROCEDURE — 70553 MRI PITUITARY W W/O CONTRAST: ICD-10-PCS | Mod: 26,,, | Performed by: RADIOLOGY

## 2021-08-11 PROCEDURE — 70553 MRI BRAIN STEM W/O & W/DYE: CPT | Mod: TC,PO

## 2021-08-11 PROCEDURE — A9585 GADOBUTROL INJECTION: HCPCS | Mod: PO | Performed by: NEUROLOGICAL SURGERY

## 2021-08-11 RX ORDER — GADOBUTROL 604.72 MG/ML
9 INJECTION INTRAVENOUS
Status: COMPLETED | OUTPATIENT
Start: 2021-08-11 | End: 2021-08-11

## 2021-08-11 RX ADMIN — GADOBUTROL 9 ML: 604.72 INJECTION INTRAVENOUS at 12:08

## 2021-08-23 ENCOUNTER — PATIENT MESSAGE (OUTPATIENT)
Dept: NEUROSURGERY | Facility: CLINIC | Age: 66
End: 2021-08-23

## 2021-08-26 ENCOUNTER — OFFICE VISIT (OUTPATIENT)
Dept: NEUROSURGERY | Facility: CLINIC | Age: 66
End: 2021-08-26
Payer: MEDICARE

## 2021-08-26 DIAGNOSIS — D35.2 PITUITARY MACROADENOMA: Primary | ICD-10-CM

## 2021-08-26 DIAGNOSIS — D49.7 PITUITARY TUMOR: ICD-10-CM

## 2021-08-26 PROCEDURE — 1160F PR REVIEW ALL MEDS BY PRESCRIBER/CLIN PHARMACIST DOCUMENTED: ICD-10-PCS | Mod: CPTII,95,, | Performed by: NEUROLOGICAL SURGERY

## 2021-08-26 PROCEDURE — 4010F ACE/ARB THERAPY RXD/TAKEN: CPT | Mod: CPTII,95,, | Performed by: NEUROLOGICAL SURGERY

## 2021-08-26 PROCEDURE — 99213 PR OFFICE/OUTPT VISIT, EST, LEVL III, 20-29 MIN: ICD-10-PCS | Mod: 95,,, | Performed by: NEUROLOGICAL SURGERY

## 2021-08-26 PROCEDURE — 99213 OFFICE O/P EST LOW 20 MIN: CPT | Mod: 95,,, | Performed by: NEUROLOGICAL SURGERY

## 2021-08-26 PROCEDURE — 3072F LOW RISK FOR RETINOPATHY: CPT | Mod: CPTII,95,, | Performed by: NEUROLOGICAL SURGERY

## 2021-08-26 PROCEDURE — 3072F PR LOW RISK FOR RETINOPATHY: ICD-10-PCS | Mod: CPTII,95,, | Performed by: NEUROLOGICAL SURGERY

## 2021-08-26 PROCEDURE — 1159F PR MEDICATION LIST DOCUMENTED IN MEDICAL RECORD: ICD-10-PCS | Mod: CPTII,95,, | Performed by: NEUROLOGICAL SURGERY

## 2021-08-26 PROCEDURE — 1160F RVW MEDS BY RX/DR IN RCRD: CPT | Mod: CPTII,95,, | Performed by: NEUROLOGICAL SURGERY

## 2021-08-26 PROCEDURE — 4010F PR ACE/ARB THEARPY RXD/TAKEN: ICD-10-PCS | Mod: CPTII,95,, | Performed by: NEUROLOGICAL SURGERY

## 2021-08-26 PROCEDURE — 1159F MED LIST DOCD IN RCRD: CPT | Mod: CPTII,95,, | Performed by: NEUROLOGICAL SURGERY

## 2021-09-03 ENCOUNTER — PATIENT MESSAGE (OUTPATIENT)
Dept: NEUROSURGERY | Facility: CLINIC | Age: 66
End: 2021-09-03

## 2021-10-07 ENCOUNTER — OFFICE VISIT (OUTPATIENT)
Dept: OPHTHALMOLOGY | Facility: CLINIC | Age: 66
End: 2021-10-07
Payer: MEDICARE

## 2021-10-07 DIAGNOSIS — H40.013 OPEN ANGLE WITH BORDERLINE FINDINGS OF BOTH EYES: ICD-10-CM

## 2021-10-07 DIAGNOSIS — H46.8 COMPRESSIVE OPTIC NEUROPATHY: Primary | ICD-10-CM

## 2021-10-07 DIAGNOSIS — D35.2 PITUITARY MACROADENOMA: ICD-10-CM

## 2021-10-07 PROCEDURE — 99999 PR PBB SHADOW E&M-EST. PATIENT-LVL II: ICD-10-PCS | Mod: PBBFAC,,, | Performed by: OPTOMETRIST

## 2021-10-07 PROCEDURE — 1160F PR REVIEW ALL MEDS BY PRESCRIBER/CLIN PHARMACIST DOCUMENTED: ICD-10-PCS | Mod: CPTII,S$GLB,, | Performed by: OPTOMETRIST

## 2021-10-07 PROCEDURE — 92012 INTRM OPH EXAM EST PATIENT: CPT | Mod: S$GLB,,, | Performed by: OPTOMETRIST

## 2021-10-07 PROCEDURE — 1159F MED LIST DOCD IN RCRD: CPT | Mod: CPTII,S$GLB,, | Performed by: OPTOMETRIST

## 2021-10-07 PROCEDURE — 4010F PR ACE/ARB THEARPY RXD/TAKEN: ICD-10-PCS | Mod: CPTII,S$GLB,, | Performed by: OPTOMETRIST

## 2021-10-07 PROCEDURE — 92133 CPTRZD OPH DX IMG PST SGM ON: CPT | Mod: S$GLB,,, | Performed by: OPTOMETRIST

## 2021-10-07 PROCEDURE — 99999 PR PBB SHADOW E&M-EST. PATIENT-LVL II: CPT | Mod: PBBFAC,,, | Performed by: OPTOMETRIST

## 2021-10-07 PROCEDURE — 1160F RVW MEDS BY RX/DR IN RCRD: CPT | Mod: CPTII,S$GLB,, | Performed by: OPTOMETRIST

## 2021-10-07 PROCEDURE — 92012 PR EYE EXAM, EST PATIENT,INTERMED: ICD-10-PCS | Mod: S$GLB,,, | Performed by: OPTOMETRIST

## 2021-10-07 PROCEDURE — 92133 POSTERIOR SEGMENT OCT OPTIC NERVE(OCULAR COHERENCE TOMOGRAPHY) - OU - BOTH EYES: ICD-10-PCS | Mod: S$GLB,,, | Performed by: OPTOMETRIST

## 2021-10-07 PROCEDURE — 1159F PR MEDICATION LIST DOCUMENTED IN MEDICAL RECORD: ICD-10-PCS | Mod: CPTII,S$GLB,, | Performed by: OPTOMETRIST

## 2021-10-07 PROCEDURE — 4010F ACE/ARB THERAPY RXD/TAKEN: CPT | Mod: CPTII,S$GLB,, | Performed by: OPTOMETRIST

## 2021-11-08 ENCOUNTER — PATIENT MESSAGE (OUTPATIENT)
Dept: ENDOCRINOLOGY | Facility: CLINIC | Age: 66
End: 2021-11-08
Payer: MEDICARE

## 2022-01-18 DIAGNOSIS — D35.2 PITUITARY MACROADENOMA: Primary | ICD-10-CM

## 2022-01-18 RX ORDER — LEVOTHYROXINE SODIUM 50 UG/1
TABLET ORAL
Qty: 30 TABLET | Refills: 1 | Status: SHIPPED | OUTPATIENT
Start: 2022-01-18 | End: 2022-02-11

## 2022-01-18 NOTE — TELEPHONE ENCOUNTER
Patient canceled 12/2021 pituitary clinic yearly f/u.  Due for visit and 8 am fasting labs now.  Please schedule labs and pituitary clinic visit.  Short term levothyroxine prescription sent.  Needs labs and visit for further refills    1. Pituitary macroadenoma  - levothyroxine (SYNTHROID) 50 MCG tablet; TAKE 1 TABLET BY MOUTH EVERY DAY  Dispense: 30 tablet; Refill: 1  - ACTH; Future  - Cortisol, 8AM; Future  - Follicle Stimulating Hormone; Future  - Prolactin; Future  - T4, Free; Future      Tevin Ron MD

## 2022-03-10 ENCOUNTER — PATIENT MESSAGE (OUTPATIENT)
Dept: NEUROSURGERY | Facility: CLINIC | Age: 67
End: 2022-03-10
Payer: MEDICARE

## 2022-03-29 PROBLEM — E11.9 TYPE 2 DIABETES MELLITUS WITHOUT COMPLICATION, WITHOUT LONG-TERM CURRENT USE OF INSULIN: Status: ACTIVE | Noted: 2018-09-18

## 2022-03-29 PROBLEM — D68.1 FACTOR XI DEFICIENCY: Status: ACTIVE | Noted: 2019-02-20

## 2022-03-29 PROBLEM — E89.3 STATUS POST TRANSSPHENOIDAL PITUITARY RESECTION: Status: ACTIVE | Noted: 2018-09-26

## 2022-03-29 PROBLEM — E11.9 TYPE 2 DIABETES MELLITUS WITHOUT RETINOPATHY: Status: RESOLVED | Noted: 2017-07-24 | Resolved: 2022-03-29

## 2022-04-07 ENCOUNTER — OFFICE VISIT (OUTPATIENT)
Dept: OPHTHALMOLOGY | Facility: CLINIC | Age: 67
End: 2022-04-07
Payer: MEDICARE

## 2022-04-07 DIAGNOSIS — D35.2 PITUITARY MACROADENOMA: ICD-10-CM

## 2022-04-07 DIAGNOSIS — H52.4 HYPEROPIA WITH ASTIGMATISM AND PRESBYOPIA, BILATERAL: ICD-10-CM

## 2022-04-07 DIAGNOSIS — H46.8 COMPRESSIVE OPTIC NEUROPATHY: Primary | ICD-10-CM

## 2022-04-07 DIAGNOSIS — E11.9 TYPE 2 DIABETES MELLITUS WITHOUT RETINOPATHY: ICD-10-CM

## 2022-04-07 DIAGNOSIS — E11.36 DIABETIC CATARACT OF BOTH EYES: ICD-10-CM

## 2022-04-07 DIAGNOSIS — H25.13 NUCLEAR SCLEROSIS, BILATERAL: ICD-10-CM

## 2022-04-07 DIAGNOSIS — H40.013 OPEN ANGLE WITH BORDERLINE FINDINGS OF BOTH EYES: ICD-10-CM

## 2022-04-07 DIAGNOSIS — H52.03 HYPEROPIA WITH ASTIGMATISM AND PRESBYOPIA, BILATERAL: ICD-10-CM

## 2022-04-07 DIAGNOSIS — H52.203 HYPEROPIA WITH ASTIGMATISM AND PRESBYOPIA, BILATERAL: ICD-10-CM

## 2022-04-07 PROCEDURE — 92014 PR EYE EXAM, EST PATIENT,COMPREHESV: ICD-10-PCS | Mod: S$GLB,,, | Performed by: OPTOMETRIST

## 2022-04-07 PROCEDURE — 99999 PR PBB SHADOW E&M-EST. PATIENT-LVL II: CPT | Mod: PBBFAC,,, | Performed by: OPTOMETRIST

## 2022-04-07 PROCEDURE — 1160F PR REVIEW ALL MEDS BY PRESCRIBER/CLIN PHARMACIST DOCUMENTED: ICD-10-PCS | Mod: CPTII,S$GLB,, | Performed by: OPTOMETRIST

## 2022-04-07 PROCEDURE — 4010F PR ACE/ARB THEARPY RXD/TAKEN: ICD-10-PCS | Mod: CPTII,S$GLB,, | Performed by: OPTOMETRIST

## 2022-04-07 PROCEDURE — 99999 PR PBB SHADOW E&M-EST. PATIENT-LVL II: ICD-10-PCS | Mod: PBBFAC,,, | Performed by: OPTOMETRIST

## 2022-04-07 PROCEDURE — 4010F ACE/ARB THERAPY RXD/TAKEN: CPT | Mod: CPTII,S$GLB,, | Performed by: OPTOMETRIST

## 2022-04-07 PROCEDURE — 92083 EXTENDED VISUAL FIELD XM: CPT | Mod: S$GLB,,, | Performed by: OPTOMETRIST

## 2022-04-07 PROCEDURE — 92014 COMPRE OPH EXAM EST PT 1/>: CPT | Mod: S$GLB,,, | Performed by: OPTOMETRIST

## 2022-04-07 PROCEDURE — 2023F PR DILATED RETINAL EXAM W/O EVID OF RETINOPATHY: ICD-10-PCS | Mod: CPTII,S$GLB,, | Performed by: OPTOMETRIST

## 2022-04-07 PROCEDURE — 92015 DETERMINE REFRACTIVE STATE: CPT | Mod: S$GLB,,, | Performed by: OPTOMETRIST

## 2022-04-07 PROCEDURE — 92015 PR REFRACTION: ICD-10-PCS | Mod: S$GLB,,, | Performed by: OPTOMETRIST

## 2022-04-07 PROCEDURE — 1160F RVW MEDS BY RX/DR IN RCRD: CPT | Mod: CPTII,S$GLB,, | Performed by: OPTOMETRIST

## 2022-04-07 PROCEDURE — 1159F MED LIST DOCD IN RCRD: CPT | Mod: CPTII,S$GLB,, | Performed by: OPTOMETRIST

## 2022-04-07 PROCEDURE — 92083 HUMPHREY VISUAL FIELD - OU - BOTH EYES: ICD-10-PCS | Mod: S$GLB,,, | Performed by: OPTOMETRIST

## 2022-04-07 PROCEDURE — 2023F DILAT RTA XM W/O RTNOPTHY: CPT | Mod: CPTII,S$GLB,, | Performed by: OPTOMETRIST

## 2022-04-07 PROCEDURE — 1159F PR MEDICATION LIST DOCUMENTED IN MEDICAL RECORD: ICD-10-PCS | Mod: CPTII,S$GLB,, | Performed by: OPTOMETRIST

## 2022-04-07 NOTE — PROGRESS NOTES
HPI     Annual Exam     Comments: Vision changes since last eye exam?: no   Any eye pain today: no  Other ocular symptoms: no  Interested in contact lens fitting today? no            Last edited by Jaki Dumas MA on 4/7/2022 10:17 AM. (History)            Assessment /Plan     For exam results, see Encounter Report.    Compressive optic neuropathy  -     Hill Visual Field - OU - Extended - Both Eyes    Pituitary macroadenoma  -     Hill Visual Field - OU - Extended - Both Eyes    Open angle with borderline findings of both eyes  -     Hill Visual Field - OU - Extended - Both Eyes  Normal IOP today OD, OS  Stable VF today OD: stable superior temporal quadrant changes  OS: no visual field defects  Monitor 6 months    Type 2 diabetes mellitus without retinopathy  There was no diabetic retinopathy present in either eye today.   Recommended that pt continue care with PCP and/or specialists regarding diabetes.  Follow-up dilated eye exam recommended in 12 months, sooner with any vision changes or new concerns.    Nuclear sclerosis, bilateral  Diabetic cataract of both eyes  Cataracts not significantly affecting activities of daily living and therefore surgery is not indicated at this time.   Will continue to monitor over the next 12 months. Pt to call or RTC with any significant change in vision prior to next visit.     Hyperopia with astigmatism and presbyopia, bilateral  Eyeglass Final Rx     Eyeglass Final Rx       Sphere Cylinder Axis Add    Right +1.00 +0.50 005 +2.50    Left +0.75 +1.00 180 +2.50    Expiration Date: 4/7/2023                RTC 6 months for IOP check and gOCT or PRN  Discussed above and all questions were answered.

## 2022-04-14 ENCOUNTER — TELEPHONE (OUTPATIENT)
Dept: NEUROSURGERY | Facility: CLINIC | Age: 67
End: 2022-04-14
Payer: MEDICARE

## 2022-04-14 ENCOUNTER — PATIENT MESSAGE (OUTPATIENT)
Dept: NEUROSURGERY | Facility: CLINIC | Age: 67
End: 2022-04-14
Payer: MEDICARE

## 2022-04-14 NOTE — TELEPHONE ENCOUNTER
----- Message from Suha Walsh, Patient Care Assistant sent at 4/14/2022 12:16 PM CDT -----  Regarding: regular appt  Contact: Pt  Pt is requesting a call back in regards to switching appt to in person. Pt states she doesn't know how get on the virtual appt.         Pt @ [phone]

## 2022-04-18 ENCOUNTER — HOSPITAL ENCOUNTER (OUTPATIENT)
Dept: RADIOLOGY | Facility: HOSPITAL | Age: 67
Discharge: HOME | End: 2022-04-18
Attending: NEUROLOGICAL SURGERY
Payer: MEDICARE

## 2022-04-18 DIAGNOSIS — D35.2 PITUITARY MACROADENOMA: ICD-10-CM

## 2022-04-18 DIAGNOSIS — D49.7 PITUITARY TUMOR: ICD-10-CM

## 2022-04-18 LAB
CREAT SERPL-MCNC: 1 MG/DL (ref 0.5–1.4)
SAMPLE: NORMAL

## 2022-04-18 PROCEDURE — A9585 GADOBUTROL INJECTION: HCPCS | Performed by: NEUROLOGICAL SURGERY

## 2022-04-18 PROCEDURE — 70553 MRI BRAIN STEM W/O & W/DYE: CPT | Mod: 26,,, | Performed by: RADIOLOGY

## 2022-04-18 PROCEDURE — 25500020 PHARM REV CODE 255: Performed by: NEUROLOGICAL SURGERY

## 2022-04-18 PROCEDURE — 70553 MRI BRAIN STEM W/O & W/DYE: CPT | Mod: TC

## 2022-04-18 PROCEDURE — 70553 MRI BRAIN W WO CONTRAST: ICD-10-PCS | Mod: 26,,, | Performed by: RADIOLOGY

## 2022-04-18 RX ORDER — GADOBUTROL 604.72 MG/ML
5 INJECTION INTRAVENOUS
Status: COMPLETED | OUTPATIENT
Start: 2022-04-18 | End: 2022-04-18

## 2022-04-18 RX ADMIN — GADOBUTROL 5 ML: 604.72 INJECTION INTRAVENOUS at 01:04

## 2022-04-25 ENCOUNTER — PATIENT MESSAGE (OUTPATIENT)
Dept: NEUROSURGERY | Facility: CLINIC | Age: 67
End: 2022-04-25
Payer: MEDICARE

## 2022-04-28 ENCOUNTER — OFFICE VISIT (OUTPATIENT)
Dept: NEUROSURGERY | Facility: CLINIC | Age: 67
End: 2022-04-28
Payer: MEDICARE

## 2022-04-28 VITALS
DIASTOLIC BLOOD PRESSURE: 83 MMHG | WEIGHT: 200 LBS | HEART RATE: 91 BPM | SYSTOLIC BLOOD PRESSURE: 145 MMHG | HEIGHT: 66 IN | TEMPERATURE: 98 F | BODY MASS INDEX: 32.14 KG/M2

## 2022-04-28 DIAGNOSIS — D49.7 PITUITARY TUMOR: Primary | ICD-10-CM

## 2022-04-28 PROCEDURE — 3008F BODY MASS INDEX DOCD: CPT | Mod: CPTII,S$GLB,, | Performed by: NEUROLOGICAL SURGERY

## 2022-04-28 PROCEDURE — 1159F MED LIST DOCD IN RCRD: CPT | Mod: CPTII,S$GLB,, | Performed by: NEUROLOGICAL SURGERY

## 2022-04-28 PROCEDURE — 3079F PR MOST RECENT DIASTOLIC BLOOD PRESSURE 80-89 MM HG: ICD-10-PCS | Mod: CPTII,S$GLB,, | Performed by: NEUROLOGICAL SURGERY

## 2022-04-28 PROCEDURE — 1159F PR MEDICATION LIST DOCUMENTED IN MEDICAL RECORD: ICD-10-PCS | Mod: CPTII,S$GLB,, | Performed by: NEUROLOGICAL SURGERY

## 2022-04-28 PROCEDURE — 99213 OFFICE O/P EST LOW 20 MIN: CPT | Mod: S$GLB,,, | Performed by: NEUROLOGICAL SURGERY

## 2022-04-28 PROCEDURE — 1101F PT FALLS ASSESS-DOCD LE1/YR: CPT | Mod: CPTII,S$GLB,, | Performed by: NEUROLOGICAL SURGERY

## 2022-04-28 PROCEDURE — 3077F SYST BP >= 140 MM HG: CPT | Mod: CPTII,S$GLB,, | Performed by: NEUROLOGICAL SURGERY

## 2022-04-28 PROCEDURE — 3072F PR LOW RISK FOR RETINOPATHY: ICD-10-PCS | Mod: CPTII,S$GLB,, | Performed by: NEUROLOGICAL SURGERY

## 2022-04-28 PROCEDURE — 3077F PR MOST RECENT SYSTOLIC BLOOD PRESSURE >= 140 MM HG: ICD-10-PCS | Mod: CPTII,S$GLB,, | Performed by: NEUROLOGICAL SURGERY

## 2022-04-28 PROCEDURE — 3008F PR BODY MASS INDEX (BMI) DOCUMENTED: ICD-10-PCS | Mod: CPTII,S$GLB,, | Performed by: NEUROLOGICAL SURGERY

## 2022-04-28 PROCEDURE — 3072F LOW RISK FOR RETINOPATHY: CPT | Mod: CPTII,S$GLB,, | Performed by: NEUROLOGICAL SURGERY

## 2022-04-28 PROCEDURE — 99213 PR OFFICE/OUTPT VISIT, EST, LEVL III, 20-29 MIN: ICD-10-PCS | Mod: S$GLB,,, | Performed by: NEUROLOGICAL SURGERY

## 2022-04-28 PROCEDURE — 1101F PR PT FALLS ASSESS DOC 0-1 FALLS W/OUT INJ PAST YR: ICD-10-PCS | Mod: CPTII,S$GLB,, | Performed by: NEUROLOGICAL SURGERY

## 2022-04-28 PROCEDURE — 1160F PR REVIEW ALL MEDS BY PRESCRIBER/CLIN PHARMACIST DOCUMENTED: ICD-10-PCS | Mod: CPTII,S$GLB,, | Performed by: NEUROLOGICAL SURGERY

## 2022-04-28 PROCEDURE — 3079F DIAST BP 80-89 MM HG: CPT | Mod: CPTII,S$GLB,, | Performed by: NEUROLOGICAL SURGERY

## 2022-04-28 PROCEDURE — 4010F ACE/ARB THERAPY RXD/TAKEN: CPT | Mod: CPTII,S$GLB,, | Performed by: NEUROLOGICAL SURGERY

## 2022-04-28 PROCEDURE — 99999 PR PBB SHADOW E&M-EST. PATIENT-LVL III: CPT | Mod: PBBFAC,,, | Performed by: NEUROLOGICAL SURGERY

## 2022-04-28 PROCEDURE — 3288F PR FALLS RISK ASSESSMENT DOCUMENTED: ICD-10-PCS | Mod: CPTII,S$GLB,, | Performed by: NEUROLOGICAL SURGERY

## 2022-04-28 PROCEDURE — 99999 PR PBB SHADOW E&M-EST. PATIENT-LVL III: ICD-10-PCS | Mod: PBBFAC,,, | Performed by: NEUROLOGICAL SURGERY

## 2022-04-28 PROCEDURE — 1160F RVW MEDS BY RX/DR IN RCRD: CPT | Mod: CPTII,S$GLB,, | Performed by: NEUROLOGICAL SURGERY

## 2022-04-28 PROCEDURE — 4010F PR ACE/ARB THEARPY RXD/TAKEN: ICD-10-PCS | Mod: CPTII,S$GLB,, | Performed by: NEUROLOGICAL SURGERY

## 2022-04-28 PROCEDURE — 1126F AMNT PAIN NOTED NONE PRSNT: CPT | Mod: CPTII,S$GLB,, | Performed by: NEUROLOGICAL SURGERY

## 2022-04-28 PROCEDURE — 1126F PR PAIN SEVERITY QUANTIFIED, NO PAIN PRESENT: ICD-10-PCS | Mod: CPTII,S$GLB,, | Performed by: NEUROLOGICAL SURGERY

## 2022-04-28 PROCEDURE — 3288F FALL RISK ASSESSMENT DOCD: CPT | Mod: CPTII,S$GLB,, | Performed by: NEUROLOGICAL SURGERY

## 2022-04-29 NOTE — PROGRESS NOTES
Neurosurgery  Established Patient    SUBJECTIVE:     History of Present Illness:  Ms. Forrester is a 66-year-old female who is seeing me today in follow-up.  Her last neurosurgery clinic appointment was on August 26, 2021. I have known Ms. Forrester for quite some time.  She underwent a transnasal transsphenoidal approach for resection of pituitary adenoma in September 2018. 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  Because of her visual field deficit, she was taken to the operating room for resection.  Postoperatively, her vision changes resolved.  She had known residual tumor in the left cavernous sinus.  I have been following the patient clinically and radiographically for some time.  Despite stable vision, serial MRIs showed growth of the residual pituitary adenoma.  Therefore, decision was made to proceed with gamma knife surgery.  This was done in May 2021. At the time of her last clinic appointment, she was post Gamma Knife radiosurgery.  She was doing well.  She denied headaches.  She denied vision changes.  She denied any weakness, paresthesias, or new symptoms.  She is here today to see me in follow-up with a repeat MRI with and without contrast of the brain with pituitary protocol.  She continues to do well.  She denies headaches.  She thinks that her vision is stable.  She states that the brain fog that she had after her Gamma Knife surgery has finally resolved.      Review of patient's allergies indicates:  No Known Allergies    Current Outpatient Medications   Medication Sig Dispense Refill    buPROPion (WELLBUTRIN XL) 150 MG TB24 tablet       lisinopril-hydrochlorothiazide (PRINZIDE,ZESTORETIC) 10-12.5 mg per tablet Take 1 tablet by mouth once daily. Take 1 tablet every other day  2    LORazepam (ATIVAN) 0.5 MG tablet every 12 (twelve) hours as needed.       multivitamin capsule Take 1 capsule by mouth.       "omega-3s-dha-epa-fish oil 720-1,200 mg Cap       simvastatin (ZOCOR) 20 MG tablet       levothyroxine (SYNTHROID) 50 MCG tablet Take 1 tablet (50 mcg total) by mouth once daily. 30 tablet 11    levothyroxine (SYNTHROID) 50 MCG tablet TAKE 1 TABLET BY MOUTH EVERY DAY 90 tablet 0    metFORMIN (GLUCOPHAGE-XR) 500 MG 24 hr tablet Take 500 mg by mouth once daily.        No current facility-administered medications for this visit.       Past Medical History:   Diagnosis Date    Complications affecting other specified body systems, hypertension 4/18/2012 11:02:54 AM    [facility] Historical - Cardiovascular: Hypertension-No Additional Notes    Hypertension     Pituitary adenoma 9/12/2018    Type I diabetes mellitus 4/18/2012 11:03:17 AM    [facility] Historical - Endocrine/Metabolic/Immune: Diabetes Mellitus, Type I-No Additional Notes     Past Surgical History:   Procedure Laterality Date    CHOLECYSTECTOMY 2016    SURGICAL REMOVAL OF PITUITARY TUMOR BY TRANSSPHENOIDAL APPROACH N/A 9/26/2018    Procedure: RESECTION, NEOPLASM, PITUITARY, TRANSSPHENOIDAL APPROACH;  Surgeon: Racquel Mcadams MD;  Location: [facility] OR [address] [place] [address];  Service: Neurosurgery;  Laterality: N/A;  WITH POSSIBLE ABDOMINAL FAT GRAFT/1.5HOUR/23HOUR STAY/TYPE/HOLD 2 UNITS/C-ARM/SUPINE/CO-SURGEON DR. LORNA GALEANA     Family History     Problem Relation (Age of Onset)    Breast cancer Maternal Aunt    Cancer Father    Cataracts Mother    Heart attack Mother    Heart disease Mother        Social History     Socioeconomic History    Marital status: Single   Tobacco Use    Smoking status: Never Smoker    Smokeless tobacco: Never Used   Substance and Sexual Activity    Alcohol use: Yes     Alcohol/week: 1.0 standard drink     Types: 1 Glasses of wine per week    Drug use: No    Sexual activity: Never       Review of Systems    OBJECTIVE:     Vital Signs  Temp: 97.9 °F (36.6 °C)  Pulse: 91  BP: (!) 145/83  Pain Score: 0-No pain  Height: 5' 6" (167.6 " cm)  Weight: 90.7 kg (200 lb)  Body mass index is 32.28 kg/m².    Physical Exam:  Vitals reviewed.    Constitutional: She appears well-developed and well-nourished. No distress.     Eyes: Pupils are equal, round, and reactive to light. Conjunctivae and EOM are normal.     Cardiovascular: Normal rate, regular rhythm, normal pulses and no edema.     Abdominal: Soft. Bowel sounds are normal.     Skin: Skin displays no rash on trunk and no rash on extremities. Skin displays no lesions on trunk and no lesions on extremities.     Psych/Behavior: She is alert. She is oriented to person, place, and time. She has a normal mood and affect.     Musculoskeletal: Gait is normal.        Neck: Range of motion is full. There is no tenderness. Muscle strength is 5/5. Tone is normal.        Back: Range of motion is full. There is no tenderness. Muscle strength is 5/5. Tone is normal.        Right Upper Extremities: Range of motion is full. There is no tenderness. Muscle strength is 5/5. Tone is normal.        Left Upper Extremities: Range of motion is full. There is no tenderness. Muscle strength is 5/5. Tone is normal.       Right Lower Extremities: Range of motion is full. There is no tenderness. Muscle strength is 5/5. Tone is normal.        Left Lower Extremities: Range of motion is full. There is no tenderness. Muscle strength is 5/5. Tone is normal.     Neurological:        Coordination: She has a normal Romberg Test, normal finger to nose coordination and normal tandem walking coordination.        Sensory: There is no sensory deficit in the trunk. There is no sensory deficit in the extremities.        DTRs: DTRs are DTRS NORMAL AND SYMMETRICnormal and symmetric. She displays no Babinski's sign on the right side. She displays no Babinski's sign on the left side.        Cranial nerves: Cranial nerve(s) II, III, IV, V, VI, VII, VIII, IX, X, XI and XII are intact.     Visual fields grossly full to confrontation.    Diagnostic  Results:  She has an MRI with and without contrast of the brain available for review which I personally reviewed.  This redemonstrates the sellar and suprasellar pituitary adenoma eccentric to the left.  It previously measured approximately 3.2 x 3.2 cm.  Currently, it measures 2.7 x 2.5 x 2.7 cm.  There is a central area of necrosis cranially.    ASSESSMENT/PLAN:     Ms. Forrester is a 66-year-old female status post transnasal, transsphenoidal resection of a pituitary macroadenoma in September 2018 and subsequent Gamma Knife radiosurgery of the residual pituitary macroadenoma in May 2021.  She is doing well and denies any signs or symptoms of pituitary dysfunction or pituitary tumor mass effect.  Her recent MRI with without contrast of the brain shows that the pituitary macroadenoma has actually decreased in size since her last scan.  There is internal tumor necrosis consistent with her history of radiosurgery.  Overall, she is doing well.  She will continue follow with both her endocrinologist as well as Ophthalmology.  I will plan on seeing her back in 6 months with a repeat MRI with and without contrast of the brain and pituitary protocol at that time.  She knows she can call with any further questions or concerns in the meantime.        Note dictated with voice recognition software, please excuse any grammatical errors.

## 2022-09-15 ENCOUNTER — PATIENT MESSAGE (OUTPATIENT)
Dept: NEUROSURGERY | Facility: CLINIC | Age: 67
End: 2022-09-15
Payer: MEDICARE

## 2022-10-13 ENCOUNTER — OFFICE VISIT (OUTPATIENT)
Dept: OPHTHALMOLOGY | Facility: CLINIC | Age: 67
End: 2022-10-13
Payer: MEDICARE

## 2022-10-13 DIAGNOSIS — H46.8 COMPRESSIVE OPTIC NEUROPATHY: Primary | ICD-10-CM

## 2022-10-13 DIAGNOSIS — H40.013 OPEN ANGLE WITH BORDERLINE FINDINGS OF BOTH EYES: ICD-10-CM

## 2022-10-13 DIAGNOSIS — D35.2 PITUITARY MACROADENOMA: ICD-10-CM

## 2022-10-13 PROCEDURE — 99999 PR PBB SHADOW E&M-EST. PATIENT-LVL II: ICD-10-PCS | Mod: PBBFAC,,, | Performed by: OPTOMETRIST

## 2022-10-13 PROCEDURE — 99999 PR PBB SHADOW E&M-EST. PATIENT-LVL II: CPT | Mod: PBBFAC,,, | Performed by: OPTOMETRIST

## 2022-10-13 PROCEDURE — 92012 PR EYE EXAM, EST PATIENT,INTERMED: ICD-10-PCS | Mod: S$GLB,,, | Performed by: OPTOMETRIST

## 2022-10-13 PROCEDURE — 92133 CPTRZD OPH DX IMG PST SGM ON: CPT | Mod: S$GLB,,, | Performed by: OPTOMETRIST

## 2022-10-13 PROCEDURE — 4010F PR ACE/ARB THEARPY RXD/TAKEN: ICD-10-PCS | Mod: CPTII,S$GLB,, | Performed by: OPTOMETRIST

## 2022-10-13 PROCEDURE — 4010F ACE/ARB THERAPY RXD/TAKEN: CPT | Mod: CPTII,S$GLB,, | Performed by: OPTOMETRIST

## 2022-10-13 PROCEDURE — 92133 POSTERIOR SEGMENT OCT OPTIC NERVE(OCULAR COHERENCE TOMOGRAPHY) - OU - BOTH EYES: ICD-10-PCS | Mod: S$GLB,,, | Performed by: OPTOMETRIST

## 2022-10-13 PROCEDURE — 1160F PR REVIEW ALL MEDS BY PRESCRIBER/CLIN PHARMACIST DOCUMENTED: ICD-10-PCS | Mod: CPTII,S$GLB,, | Performed by: OPTOMETRIST

## 2022-10-13 PROCEDURE — 1160F RVW MEDS BY RX/DR IN RCRD: CPT | Mod: CPTII,S$GLB,, | Performed by: OPTOMETRIST

## 2022-10-13 PROCEDURE — 1159F MED LIST DOCD IN RCRD: CPT | Mod: CPTII,S$GLB,, | Performed by: OPTOMETRIST

## 2022-10-13 PROCEDURE — 92012 INTRM OPH EXAM EST PATIENT: CPT | Mod: S$GLB,,, | Performed by: OPTOMETRIST

## 2022-10-13 PROCEDURE — 1159F PR MEDICATION LIST DOCUMENTED IN MEDICAL RECORD: ICD-10-PCS | Mod: CPTII,S$GLB,, | Performed by: OPTOMETRIST

## 2022-10-13 NOTE — PROGRESS NOTES
HPI     Glaucoma            Comments: Medication eye drops: none  Last HVF: 4/07/22  Last gOCT: 10/13/22  Last SDP: none                Last edited by Jaki Dumas MA on 10/13/2022  1:05 PM.            Assessment /Plan     For exam results, see Encounter Report.    Compressive optic neuropathy  -     Posterior Segment OCT Optic Nerve- Both eyes    Pituitary macroadenoma  -     Posterior Segment OCT Optic Nerve- Both eyes    Open angle with borderline findings of both eyes  -     Posterior Segment OCT Optic Nerve- Both eyes    Normal IOP today OD, OS  Stable gOCT today OU  Monitor 6 months      RTC 6 months for 24-2VF and dilated exam or PRN  Discussed above and all questions were answered.

## 2022-10-31 ENCOUNTER — HOSPITAL ENCOUNTER (OUTPATIENT)
Dept: RADIOLOGY | Facility: HOSPITAL | Age: 67
Discharge: HOME | End: 2022-10-31
Attending: NEUROLOGICAL SURGERY
Payer: MEDICARE

## 2022-10-31 ENCOUNTER — OFFICE VISIT (OUTPATIENT)
Dept: NEUROSURGERY | Facility: CLINIC | Age: 67
End: 2022-10-31
Payer: MEDICARE

## 2022-10-31 VITALS
TEMPERATURE: 97 F | SYSTOLIC BLOOD PRESSURE: 136 MMHG | HEART RATE: 91 BPM | BODY MASS INDEX: 32.28 KG/M2 | HEIGHT: 66 IN | DIASTOLIC BLOOD PRESSURE: 50 MMHG

## 2022-10-31 DIAGNOSIS — D49.7 PITUITARY TUMOR: ICD-10-CM

## 2022-10-31 DIAGNOSIS — E89.3 STATUS POST TRANSSPHENOIDAL PITUITARY RESECTION: ICD-10-CM

## 2022-10-31 DIAGNOSIS — D35.2 PITUITARY MACROADENOMA: Primary | ICD-10-CM

## 2022-10-31 PROCEDURE — 70553 MRI BRAIN STEM W/O & W/DYE: CPT | Mod: TC

## 2022-10-31 PROCEDURE — 3288F FALL RISK ASSESSMENT DOCD: CPT | Mod: CPTII,S$GLB,, | Performed by: NEUROLOGICAL SURGERY

## 2022-10-31 PROCEDURE — 1101F PT FALLS ASSESS-DOCD LE1/YR: CPT | Mod: CPTII,S$GLB,, | Performed by: NEUROLOGICAL SURGERY

## 2022-10-31 PROCEDURE — 3008F BODY MASS INDEX DOCD: CPT | Mod: CPTII,S$GLB,, | Performed by: NEUROLOGICAL SURGERY

## 2022-10-31 PROCEDURE — 3078F PR MOST RECENT DIASTOLIC BLOOD PRESSURE < 80 MM HG: ICD-10-PCS | Mod: CPTII,S$GLB,, | Performed by: NEUROLOGICAL SURGERY

## 2022-10-31 PROCEDURE — 4010F ACE/ARB THERAPY RXD/TAKEN: CPT | Mod: CPTII,S$GLB,, | Performed by: NEUROLOGICAL SURGERY

## 2022-10-31 PROCEDURE — 1126F PR PAIN SEVERITY QUANTIFIED, NO PAIN PRESENT: ICD-10-PCS | Mod: CPTII,S$GLB,, | Performed by: NEUROLOGICAL SURGERY

## 2022-10-31 PROCEDURE — 1159F PR MEDICATION LIST DOCUMENTED IN MEDICAL RECORD: ICD-10-PCS | Mod: CPTII,S$GLB,, | Performed by: NEUROLOGICAL SURGERY

## 2022-10-31 PROCEDURE — 99213 PR OFFICE/OUTPT VISIT, EST, LEVL III, 20-29 MIN: ICD-10-PCS | Mod: S$GLB,,, | Performed by: NEUROLOGICAL SURGERY

## 2022-10-31 PROCEDURE — 3075F PR MOST RECENT SYSTOLIC BLOOD PRESS GE 130-139MM HG: ICD-10-PCS | Mod: CPTII,S$GLB,, | Performed by: NEUROLOGICAL SURGERY

## 2022-10-31 PROCEDURE — 3075F SYST BP GE 130 - 139MM HG: CPT | Mod: CPTII,S$GLB,, | Performed by: NEUROLOGICAL SURGERY

## 2022-10-31 PROCEDURE — 1160F PR REVIEW ALL MEDS BY PRESCRIBER/CLIN PHARMACIST DOCUMENTED: ICD-10-PCS | Mod: CPTII,S$GLB,, | Performed by: NEUROLOGICAL SURGERY

## 2022-10-31 PROCEDURE — 1126F AMNT PAIN NOTED NONE PRSNT: CPT | Mod: CPTII,S$GLB,, | Performed by: NEUROLOGICAL SURGERY

## 2022-10-31 PROCEDURE — 70553 MRI BRAIN W WO CONTRAST: ICD-10-PCS | Mod: 26,,, | Performed by: RADIOLOGY

## 2022-10-31 PROCEDURE — 99213 OFFICE O/P EST LOW 20 MIN: CPT | Mod: S$GLB,,, | Performed by: NEUROLOGICAL SURGERY

## 2022-10-31 PROCEDURE — 70553 MRI BRAIN STEM W/O & W/DYE: CPT | Mod: 26,,, | Performed by: RADIOLOGY

## 2022-10-31 PROCEDURE — 4010F PR ACE/ARB THEARPY RXD/TAKEN: ICD-10-PCS | Mod: CPTII,S$GLB,, | Performed by: NEUROLOGICAL SURGERY

## 2022-10-31 PROCEDURE — 99999 PR PBB SHADOW E&M-EST. PATIENT-LVL III: ICD-10-PCS | Mod: PBBFAC,,, | Performed by: NEUROLOGICAL SURGERY

## 2022-10-31 PROCEDURE — 1160F RVW MEDS BY RX/DR IN RCRD: CPT | Mod: CPTII,S$GLB,, | Performed by: NEUROLOGICAL SURGERY

## 2022-10-31 PROCEDURE — 1101F PR PT FALLS ASSESS DOC 0-1 FALLS W/OUT INJ PAST YR: ICD-10-PCS | Mod: CPTII,S$GLB,, | Performed by: NEUROLOGICAL SURGERY

## 2022-10-31 PROCEDURE — 3072F PR LOW RISK FOR RETINOPATHY: ICD-10-PCS | Mod: CPTII,S$GLB,, | Performed by: NEUROLOGICAL SURGERY

## 2022-10-31 PROCEDURE — A9585 GADOBUTROL INJECTION: HCPCS | Performed by: NEUROLOGICAL SURGERY

## 2022-10-31 PROCEDURE — 3008F PR BODY MASS INDEX (BMI) DOCUMENTED: ICD-10-PCS | Mod: CPTII,S$GLB,, | Performed by: NEUROLOGICAL SURGERY

## 2022-10-31 PROCEDURE — 3078F DIAST BP <80 MM HG: CPT | Mod: CPTII,S$GLB,, | Performed by: NEUROLOGICAL SURGERY

## 2022-10-31 PROCEDURE — 25500020 PHARM REV CODE 255: Performed by: NEUROLOGICAL SURGERY

## 2022-10-31 PROCEDURE — 3288F PR FALLS RISK ASSESSMENT DOCUMENTED: ICD-10-PCS | Mod: CPTII,S$GLB,, | Performed by: NEUROLOGICAL SURGERY

## 2022-10-31 PROCEDURE — 3072F LOW RISK FOR RETINOPATHY: CPT | Mod: CPTII,S$GLB,, | Performed by: NEUROLOGICAL SURGERY

## 2022-10-31 PROCEDURE — 99999 PR PBB SHADOW E&M-EST. PATIENT-LVL III: CPT | Mod: PBBFAC,,, | Performed by: NEUROLOGICAL SURGERY

## 2022-10-31 PROCEDURE — 1159F MED LIST DOCD IN RCRD: CPT | Mod: CPTII,S$GLB,, | Performed by: NEUROLOGICAL SURGERY

## 2022-10-31 RX ORDER — GADOBUTROL 604.72 MG/ML
5 INJECTION INTRAVENOUS
Status: COMPLETED | OUTPATIENT
Start: 2022-10-31 | End: 2022-10-31

## 2022-10-31 RX ADMIN — GADOBUTROL 5 ML: 604.72 INJECTION INTRAVENOUS at 12:10

## 2022-11-04 NOTE — PROGRESS NOTES
Neurosurgery  Established Patient    SUBJECTIVE:     History of Present Illness:  Ms. Forrester is a 66-year-old female who is seeing me today in follow-up.  Her last neurosurgery clinic appointment was on April 28, 2022. I have known Ms. Forrester for quite some time.  She underwent a transnasal transsphenoidal approach for resection of pituitary adenoma in September 2018. 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  Because of her visual field deficit, she was taken to the operating room for resection.  Postoperatively, her vision changes resolved.  She had known residual tumor in the left cavernous sinus.  I have been following the patient clinically and radiographically for some time.  Despite stable vision, serial MRIs showed growth of the residual pituitary adenoma.  Therefore, decision was made to proceed with gamma knife surgery.  This was done in May 2021. Post Gamma Knife radiosurgery she has been doing well.  She denies headaches.  She denies vision changes. She is here today to see me in follow-up with a repeat MRI with and without contrast of the brain with pituitary protocol.  She continues to do well.  She denies headaches.  She thinks that her vision is stable.  She states that the brain fog that she had after her Gamma Knife surgery has finally resolved and she feels the best she has felt in awhile.       Review of patient's allergies indicates:  No Known Allergies    Current Outpatient Medications   Medication Sig Dispense Refill    buPROPion (WELLBUTRIN XL) 150 MG TB24 tablet       lisinopril-hydrochlorothiazide (PRINZIDE,ZESTORETIC) 10-12.5 mg per tablet Take 1 tablet by mouth once daily. Take 1 tablet every other day  2    LORazepam (ATIVAN) 0.5 MG tablet every 12 (twelve) hours as needed.       multivitamin capsule Take 1 capsule by mouth.      omega-3s-dha-epa-fish oil 720-1,200 mg Cap       simvastatin (ZOCOR) 20 MG  "tablet       levothyroxine (SYNTHROID) 50 MCG tablet Take 1 tablet (50 mcg total) by mouth once daily. 30 tablet 11    levothyroxine (SYNTHROID) 50 MCG tablet TAKE 1 TABLET BY MOUTH EVERY DAY 90 tablet 0    metFORMIN (GLUCOPHAGE-XR) 500 MG 24 hr tablet Take 500 mg by mouth once daily.        No current facility-administered medications for this visit.       Past Medical History:   Diagnosis Date    Complications affecting other specified body systems, hypertension 4/18/2012 11:02:54 AM    [facility] Historical - Cardiovascular: Hypertension-No Additional Notes    Hypertension     Pituitary adenoma 9/12/2018    Type I diabetes mellitus 4/18/2012 11:03:17 AM    [facility] Historical - Endocrine/Metabolic/Immune: Diabetes Mellitus, Type I-No Additional Notes     Past Surgical History:   Procedure Laterality Date    CHOLECYSTECTOMY 2016    SURGICAL REMOVAL OF PITUITARY TUMOR BY TRANSSPHENOIDAL APPROACH N/A 9/26/2018    Procedure: RESECTION, NEOPLASM, PITUITARY, TRANSSPHENOIDAL APPROACH;  Surgeon: Racquel Mcadams MD;  Location: [facility] OR [address] [place] [address];  Service: Neurosurgery;  Laterality: N/A;  WITH POSSIBLE ABDOMINAL FAT GRAFT/1.5HOUR/23HOUR STAY/TYPE/HOLD 2 UNITS/C-ARM/SUPINE/CO-SURGEON DR. LORNA GALEANA     Family History       Problem Relation (Age of Onset)    Breast cancer Maternal Aunt    Cancer Father    Cataracts Mother    Heart attack Mother    Heart disease Mother          Social History     Socioeconomic History    Marital status: Single   Tobacco Use    Smoking status: Never    Smokeless tobacco: Never   Substance and Sexual Activity    Alcohol use: Yes     Alcohol/week: 1.0 standard drink     Types: 1 Glasses of wine per week    Drug use: No    Sexual activity: Never       Review of Systems    OBJECTIVE:     Vital Signs  Temp: 97 °F (36.1 °C)  Pulse: 91  BP: (!) 136/50  Pain Score: 0-No pain  Height: 5' 6" (167.6 cm)  Body mass index is 32.28 kg/m².    Physical Exam:  Vitals reviewed.    Constitutional: She " appears well-developed and well-nourished. No distress.     Eyes: Pupils are equal, round, and reactive to light. Conjunctivae and EOM are normal.     Cardiovascular: Normal rate, regular rhythm, normal pulses and no edema.     Abdominal: Soft. Bowel sounds are normal.     Skin: Skin displays no rash on trunk and no rash on extremities. Skin displays no lesions on trunk and no lesions on extremities.     Psych/Behavior: She is alert. She is oriented to person, place, and time. She has a normal mood and affect.     Musculoskeletal: Gait is normal.        Neck: Range of motion is full. There is no tenderness. Muscle strength is 5/5. Tone is normal.        Back: Range of motion is full. There is no tenderness. Muscle strength is 5/5. Tone is normal.        Right Upper Extremities: Range of motion is full. There is no tenderness. Muscle strength is 5/5. Tone is normal.        Left Upper Extremities: Range of motion is full. There is no tenderness. Muscle strength is 5/5. Tone is normal.       Right Lower Extremities: Range of motion is full. There is no tenderness. Muscle strength is 5/5. Tone is normal.        Left Lower Extremities: Range of motion is full. There is no tenderness. Muscle strength is 5/5. Tone is normal.     Neurological:        Coordination: She has a normal Romberg Test, normal finger to nose coordination and normal tandem walking coordination.        Sensory: There is no sensory deficit in the trunk. There is no sensory deficit in the extremities.        DTRs: DTRs are DTRS NORMAL AND SYMMETRICnormal and symmetric. She displays no Babinski's sign on the right side. She displays no Babinski's sign on the left side.        Cranial nerves: Cranial nerve(s) II, III, IV, V, VI, VII, VIII, IX, X, XI and XII are intact.       Diagnostic Results:  She has an MRI of the brain with and without contrast available for review which I personally reviewed. This redemonstrates her known pituitary adenoma. When  compared to the last MRI brain, this appears decreased in size with less mass effect of the left side of the optic chiasm. It now measures 2.5 x 2.5 x 2.6 cm where it previously measured 2.7 x 2.5 x 2.7 cm.    ASSESSMENT/PLAN:     Ms. Forrester is a 66-year-old female status post transnasal, transsphenoidal resection of pituitary adenoma with known residual in the left cavernous sinus. There was subsequent growth of the known residual on follow-up MRIs. Therefore, the patient was taken for Gamma Knife radiation. Post radiation she has continued to do well with continued shrinkage of the residual adenoma. Her adenoma has again decreased in size on her most recent MRI and she continues to feel well without visual disturbances. I will plan on seeing her back in approximately 6 months with a repeat MRI with and without contrast of the brain at that time. If the adenoma is stable to decreased in size at that time we will push her follow-up out to every year instead of every 6 months. She knows she can call with any further questions or concerns in the meantime.        Note dictated with voice recognition software, please excuse any grammatical errors.

## 2023-04-13 ENCOUNTER — OFFICE VISIT (OUTPATIENT)
Dept: OPHTHALMOLOGY | Facility: CLINIC | Age: 68
End: 2023-04-13
Payer: MEDICARE

## 2023-04-13 DIAGNOSIS — E11.9 TYPE 2 DIABETES MELLITUS WITHOUT RETINOPATHY: Primary | ICD-10-CM

## 2023-04-13 DIAGNOSIS — E11.36 DIABETIC CATARACT OF BOTH EYES: ICD-10-CM

## 2023-04-13 DIAGNOSIS — D35.2 PITUITARY MACROADENOMA: ICD-10-CM

## 2023-04-13 DIAGNOSIS — H46.8 COMPRESSIVE OPTIC NEUROPATHY: ICD-10-CM

## 2023-04-13 DIAGNOSIS — H52.203 HYPEROPIA WITH ASTIGMATISM AND PRESBYOPIA, BILATERAL: ICD-10-CM

## 2023-04-13 DIAGNOSIS — H52.4 HYPEROPIA WITH ASTIGMATISM AND PRESBYOPIA, BILATERAL: ICD-10-CM

## 2023-04-13 DIAGNOSIS — H52.03 HYPEROPIA WITH ASTIGMATISM AND PRESBYOPIA, BILATERAL: ICD-10-CM

## 2023-04-13 DIAGNOSIS — H40.013 OPEN ANGLE WITH BORDERLINE FINDINGS OF BOTH EYES: ICD-10-CM

## 2023-04-13 DIAGNOSIS — H25.13 NUCLEAR SCLEROSIS, BILATERAL: ICD-10-CM

## 2023-04-13 PROCEDURE — 99999 PR PBB SHADOW E&M-EST. PATIENT-LVL II: CPT | Mod: PBBFAC,,, | Performed by: OPTOMETRIST

## 2023-04-13 PROCEDURE — 92083 HUMPHREY VISUAL FIELD - OU - BOTH EYES: ICD-10-PCS | Mod: S$GLB,,, | Performed by: OPTOMETRIST

## 2023-04-13 PROCEDURE — 99999 PR PBB SHADOW E&M-EST. PATIENT-LVL II: ICD-10-PCS | Mod: PBBFAC,,, | Performed by: OPTOMETRIST

## 2023-04-13 PROCEDURE — 1159F MED LIST DOCD IN RCRD: CPT | Mod: CPTII,S$GLB,, | Performed by: OPTOMETRIST

## 2023-04-13 PROCEDURE — 2023F PR DILATED RETINAL EXAM W/O EVID OF RETINOPATHY: ICD-10-PCS | Mod: CPTII,S$GLB,, | Performed by: OPTOMETRIST

## 2023-04-13 PROCEDURE — 1160F PR REVIEW ALL MEDS BY PRESCRIBER/CLIN PHARMACIST DOCUMENTED: ICD-10-PCS | Mod: CPTII,S$GLB,, | Performed by: OPTOMETRIST

## 2023-04-13 PROCEDURE — 4010F ACE/ARB THERAPY RXD/TAKEN: CPT | Mod: CPTII,S$GLB,, | Performed by: OPTOMETRIST

## 2023-04-13 PROCEDURE — 1159F PR MEDICATION LIST DOCUMENTED IN MEDICAL RECORD: ICD-10-PCS | Mod: CPTII,S$GLB,, | Performed by: OPTOMETRIST

## 2023-04-13 PROCEDURE — 92015 DETERMINE REFRACTIVE STATE: CPT | Mod: S$GLB,,, | Performed by: OPTOMETRIST

## 2023-04-13 PROCEDURE — 92083 EXTENDED VISUAL FIELD XM: CPT | Mod: S$GLB,,, | Performed by: OPTOMETRIST

## 2023-04-13 PROCEDURE — 92014 COMPRE OPH EXAM EST PT 1/>: CPT | Mod: S$GLB,,, | Performed by: OPTOMETRIST

## 2023-04-13 PROCEDURE — 92015 PR REFRACTION: ICD-10-PCS | Mod: S$GLB,,, | Performed by: OPTOMETRIST

## 2023-04-13 PROCEDURE — 92014 PR EYE EXAM, EST PATIENT,COMPREHESV: ICD-10-PCS | Mod: S$GLB,,, | Performed by: OPTOMETRIST

## 2023-04-13 PROCEDURE — 1160F RVW MEDS BY RX/DR IN RCRD: CPT | Mod: CPTII,S$GLB,, | Performed by: OPTOMETRIST

## 2023-04-13 PROCEDURE — 4010F PR ACE/ARB THEARPY RXD/TAKEN: ICD-10-PCS | Mod: CPTII,S$GLB,, | Performed by: OPTOMETRIST

## 2023-04-13 PROCEDURE — 2023F DILAT RTA XM W/O RTNOPTHY: CPT | Mod: CPTII,S$GLB,, | Performed by: OPTOMETRIST

## 2023-04-13 NOTE — PROGRESS NOTES
HPI     Annual Exam            Comments: Medication eye drops:none  Last HVF: 4/13/22  Last gOCT: 10/13/22  Last SDP: none  Pt states VA is stable, no pain or occular issues.          Last edited by Jaki Dumas MA on 4/13/2023  1:25 PM.            Assessment /Plan     For exam results, see Encounter Report.    Type 2 diabetes mellitus without retinopathy  There was no diabetic retinopathy present in either eye today.   Recommended that pt continue care with PCP and/or specialists regarding diabetes.  Follow-up dilated eye exam recommended in 12 months, sooner with any vision changes or new concerns.    Compressive optic neuropathy  -     Hill Visual Field - OU - Extended - Both Eyes  Pituitary macroadenoma  -     Hill Visual Field - OU - Extended - Both Eyes  Open angle with borderline findings of both eyes  -     Hill Visual Field - OU - Extended - Both Eyes  Normal IOP today OD, OS  Overall stable VF OD  No evidence of glaucoma at this time  Pt will likely be followed yearly by neurosurg going forward pending next visit  Monitor 12 months    Nuclear sclerosis, bilateral  Diabetic cataract of both eyes  Cataracts not significantly affecting activities of daily living and therefore surgery is not indicated at this time.   Will continue to monitor over the next 12 months. Pt to call or RTC with any significant change in vision prior to next visit.     Hyperopia with astigmatism and presbyopia, bilateral  Eyeglass Final Rx       Eyeglass Final Rx         Sphere Cylinder Axis Add    Right +1.00 +0.50 005 +2.50    Left +0.75 +1.00 180 +2.50      Expiration Date: 4/13/2024                  Pt is moving to [place], [facility] and will get recommendation for eye dr      RTC 1 year for 24-2VF, gOCT and dilated exam or PRN  Discussed above and all questions were answered.

## 2023-07-20 ENCOUNTER — OFFICE VISIT (OUTPATIENT)
Dept: NEUROSURGERY | Facility: CLINIC | Age: 68
End: 2023-07-20
Payer: MEDICARE

## 2023-07-20 ENCOUNTER — HOSPITAL ENCOUNTER (OUTPATIENT)
Dept: RADIOLOGY | Facility: HOSPITAL | Age: 68
Discharge: HOME | End: 2023-07-20
Attending: NEUROLOGICAL SURGERY
Payer: MEDICARE

## 2023-07-20 VITALS
DIASTOLIC BLOOD PRESSURE: 85 MMHG | WEIGHT: 184 LBS | HEIGHT: 64 IN | SYSTOLIC BLOOD PRESSURE: 140 MMHG | TEMPERATURE: 98 F | BODY MASS INDEX: 31.41 KG/M2 | HEART RATE: 89 BPM

## 2023-07-20 DIAGNOSIS — R42 DIZZINESS AND GIDDINESS: ICD-10-CM

## 2023-07-20 DIAGNOSIS — E89.3 STATUS POST TRANSSPHENOIDAL PITUITARY RESECTION: ICD-10-CM

## 2023-07-20 DIAGNOSIS — D35.2 PITUITARY MACROADENOMA: ICD-10-CM

## 2023-07-20 DIAGNOSIS — D49.7 PITUITARY TUMOR: Primary | ICD-10-CM

## 2023-07-20 PROCEDURE — 3061F NEG MICROALBUMINURIA REV: CPT | Mod: CPTII,S$GLB,, | Performed by: NEUROLOGICAL SURGERY

## 2023-07-20 PROCEDURE — 1159F MED LIST DOCD IN RCRD: CPT | Mod: CPTII,S$GLB,, | Performed by: NEUROLOGICAL SURGERY

## 2023-07-20 PROCEDURE — 3066F NEPHROPATHY DOC TX: CPT | Mod: CPTII,S$GLB,, | Performed by: NEUROLOGICAL SURGERY

## 2023-07-20 PROCEDURE — 3077F PR MOST RECENT SYSTOLIC BLOOD PRESSURE >= 140 MM HG: ICD-10-PCS | Mod: CPTII,S$GLB,, | Performed by: NEUROLOGICAL SURGERY

## 2023-07-20 PROCEDURE — 3079F DIAST BP 80-89 MM HG: CPT | Mod: CPTII,S$GLB,, | Performed by: NEUROLOGICAL SURGERY

## 2023-07-20 PROCEDURE — 99213 PR OFFICE/OUTPT VISIT, EST, LEVL III, 20-29 MIN: ICD-10-PCS | Mod: S$GLB,,, | Performed by: NEUROLOGICAL SURGERY

## 2023-07-20 PROCEDURE — 3066F PR DOCUMENTATION OF TREATMENT FOR NEPHROPATHY: ICD-10-PCS | Mod: CPTII,S$GLB,, | Performed by: NEUROLOGICAL SURGERY

## 2023-07-20 PROCEDURE — 3008F PR BODY MASS INDEX (BMI) DOCUMENTED: ICD-10-PCS | Mod: CPTII,S$GLB,, | Performed by: NEUROLOGICAL SURGERY

## 2023-07-20 PROCEDURE — 3288F PR FALLS RISK ASSESSMENT DOCUMENTED: ICD-10-PCS | Mod: CPTII,S$GLB,, | Performed by: NEUROLOGICAL SURGERY

## 2023-07-20 PROCEDURE — 1160F RVW MEDS BY RX/DR IN RCRD: CPT | Mod: CPTII,S$GLB,, | Performed by: NEUROLOGICAL SURGERY

## 2023-07-20 PROCEDURE — 4010F PR ACE/ARB THEARPY RXD/TAKEN: ICD-10-PCS | Mod: CPTII,S$GLB,, | Performed by: NEUROLOGICAL SURGERY

## 2023-07-20 PROCEDURE — 70553 MRI BRAIN STEM W/O & W/DYE: CPT | Mod: 26,,, | Performed by: RADIOLOGY

## 2023-07-20 PROCEDURE — 99999 PR PBB SHADOW E&M-EST. PATIENT-LVL III: ICD-10-PCS | Mod: PBBFAC,,, | Performed by: NEUROLOGICAL SURGERY

## 2023-07-20 PROCEDURE — 1101F PT FALLS ASSESS-DOCD LE1/YR: CPT | Mod: CPTII,S$GLB,, | Performed by: NEUROLOGICAL SURGERY

## 2023-07-20 PROCEDURE — 1101F PR PT FALLS ASSESS DOC 0-1 FALLS W/OUT INJ PAST YR: ICD-10-PCS | Mod: CPTII,S$GLB,, | Performed by: NEUROLOGICAL SURGERY

## 2023-07-20 PROCEDURE — 1126F AMNT PAIN NOTED NONE PRSNT: CPT | Mod: CPTII,S$GLB,, | Performed by: NEUROLOGICAL SURGERY

## 2023-07-20 PROCEDURE — 25500020 PHARM REV CODE 255: Performed by: NEUROLOGICAL SURGERY

## 2023-07-20 PROCEDURE — 4010F ACE/ARB THERAPY RXD/TAKEN: CPT | Mod: CPTII,S$GLB,, | Performed by: NEUROLOGICAL SURGERY

## 2023-07-20 PROCEDURE — 3072F LOW RISK FOR RETINOPATHY: CPT | Mod: CPTII,S$GLB,, | Performed by: NEUROLOGICAL SURGERY

## 2023-07-20 PROCEDURE — 3061F PR NEG MICROALBUMINURIA RESULT DOCUMENTED/REVIEW: ICD-10-PCS | Mod: CPTII,S$GLB,, | Performed by: NEUROLOGICAL SURGERY

## 2023-07-20 PROCEDURE — 1160F PR REVIEW ALL MEDS BY PRESCRIBER/CLIN PHARMACIST DOCUMENTED: ICD-10-PCS | Mod: CPTII,S$GLB,, | Performed by: NEUROLOGICAL SURGERY

## 2023-07-20 PROCEDURE — 99999 PR PBB SHADOW E&M-EST. PATIENT-LVL III: CPT | Mod: PBBFAC,,, | Performed by: NEUROLOGICAL SURGERY

## 2023-07-20 PROCEDURE — 3079F PR MOST RECENT DIASTOLIC BLOOD PRESSURE 80-89 MM HG: ICD-10-PCS | Mod: CPTII,S$GLB,, | Performed by: NEUROLOGICAL SURGERY

## 2023-07-20 PROCEDURE — A9585 GADOBUTROL INJECTION: HCPCS | Performed by: NEUROLOGICAL SURGERY

## 2023-07-20 PROCEDURE — 3077F SYST BP >= 140 MM HG: CPT | Mod: CPTII,S$GLB,, | Performed by: NEUROLOGICAL SURGERY

## 2023-07-20 PROCEDURE — 3288F FALL RISK ASSESSMENT DOCD: CPT | Mod: CPTII,S$GLB,, | Performed by: NEUROLOGICAL SURGERY

## 2023-07-20 PROCEDURE — 3008F BODY MASS INDEX DOCD: CPT | Mod: CPTII,S$GLB,, | Performed by: NEUROLOGICAL SURGERY

## 2023-07-20 PROCEDURE — 70553 MRI BRAIN STEM W/O & W/DYE: CPT | Mod: TC

## 2023-07-20 PROCEDURE — 3072F PR LOW RISK FOR RETINOPATHY: ICD-10-PCS | Mod: CPTII,S$GLB,, | Performed by: NEUROLOGICAL SURGERY

## 2023-07-20 PROCEDURE — 1159F PR MEDICATION LIST DOCUMENTED IN MEDICAL RECORD: ICD-10-PCS | Mod: CPTII,S$GLB,, | Performed by: NEUROLOGICAL SURGERY

## 2023-07-20 PROCEDURE — 99213 OFFICE O/P EST LOW 20 MIN: CPT | Mod: S$GLB,,, | Performed by: NEUROLOGICAL SURGERY

## 2023-07-20 PROCEDURE — 1126F PR PAIN SEVERITY QUANTIFIED, NO PAIN PRESENT: ICD-10-PCS | Mod: CPTII,S$GLB,, | Performed by: NEUROLOGICAL SURGERY

## 2023-07-20 PROCEDURE — 70553 MRI BRAIN W WO CONTRAST: ICD-10-PCS | Mod: 26,,, | Performed by: RADIOLOGY

## 2023-07-20 RX ORDER — GADOBUTROL 604.72 MG/ML
5 INJECTION INTRAVENOUS
Status: COMPLETED | OUTPATIENT
Start: 2023-07-20 | End: 2023-07-20

## 2023-07-20 RX ADMIN — GADOBUTROL 5 ML: 604.72 INJECTION INTRAVENOUS at 03:07

## 2023-07-24 NOTE — PROGRESS NOTES
Neurosurgery  Established Patient    SUBJECTIVE:     History of Present Illness:  Ms. Forrester is a 67-year-old female who is seeing me today in follow-up.  Her last neurosurgery clinic appointment was on October 31, 2022.  I have no Ms. Shore for quite some time.  She underwent a transnasal, transsphenoidal resection of a pituitary adenoma in September 2018.  She originally presented in 2018 with a right visual field deficit.  She was subsequently found to have a pituitary macroadenoma.  Because of her visual field deficit, she was taken to the operating room for resection.  Postoperatively, her vision changes resolved.  She had known residual tumor in the left cavernous sinus surrounding the left ICA.  I have been following the patient clinically and radiographically since that time.  Despite stable vision, serial MRI showed growth of the residual pituitary adenoma.  Therefore, she was taken for gamma knife surgery in May 2021.  Post gamma knife, she is been doing well.  She denies headaches or vision changes.  Her subsequent MRIs post gamma knife has shown a decrease in the size of the known residual pituitary adenoma.  She is here today to see me in follow-up.  She was doing well.  She denies headaches.  She denies vision changes.  She is getting ready to move to [place] in the next few months.    Review of patient's allergies indicates:  No Known Allergies    Current Outpatient Medications   Medication Sig Dispense Refill    buPROPion (WELLBUTRIN XL) 150 MG TB24 tablet       lisinopril-hydrochlorothiazide (PRINZIDE,ZESTORETIC) 10-12.5 mg per tablet Take 1 tablet by mouth once daily. Take 1 tablet every other day  2    LORazepam (ATIVAN) 0.5 MG tablet every 12 (twelve) hours as needed.       multivitamin capsule Take 1 capsule by mouth.      omega-3s-dha-epa-fish oil 720-1,200 mg Cap       simvastatin (ZOCOR) 20 MG tablet       levothyroxine (SYNTHROID) 50 MCG tablet Take 1 tablet (50 mcg total) by mouth once  "daily. 30 tablet 11    levothyroxine (SYNTHROID) 50 MCG tablet TAKE 1 TABLET BY MOUTH EVERY DAY 90 tablet 0    metFORMIN (GLUCOPHAGE-XR) 500 MG 24 hr tablet Take 500 mg by mouth once daily.        No current facility-administered medications for this visit.       Past Medical History:   Diagnosis Date    Complications affecting other specified body systems, hypertension 4/18/2012 11:02:54 AM    [facility] Historical - Cardiovascular: Hypertension-No Additional Notes    Hypertension     Pituitary adenoma 9/12/2018    Type I diabetes mellitus 4/18/2012 11:03:17 AM    [facility] Historical - Endocrine/Metabolic/Immune: Diabetes Mellitus, Type I-No Additional Notes     Past Surgical History:   Procedure Laterality Date    CHOLECYSTECTOMY 2016    SURGICAL REMOVAL OF PITUITARY TUMOR BY TRANSSPHENOIDAL APPROACH N/A 9/26/2018    Procedure: RESECTION, NEOPLASM, PITUITARY, TRANSSPHENOIDAL APPROACH;  Surgeon: Racquel Mcadams MD;  Location: [facility] OR [address] [place] [address];  Service: Neurosurgery;  Laterality: N/A;  WITH POSSIBLE ABDOMINAL FAT GRAFT/1.5HOUR/23HOUR STAY/TYPE/HOLD 2 UNITS/C-ARM/SUPINE/CO-SURGEON DR. LORNA GALEANA     Family History       Problem Relation (Age of Onset)    Breast cancer Maternal Aunt    Cancer Father    Cataracts Mother    Heart attack Mother    Heart disease Mother          Social History     Socioeconomic History    Marital status: Single   Tobacco Use    Smoking status: Never    Smokeless tobacco: Never   Substance and Sexual Activity    Alcohol use: Yes     Alcohol/week: 1.0 standard drink     Types: 1 Glasses of wine per week    Drug use: No    Sexual activity: Never       Review of Systems    OBJECTIVE:     Vital Signs  Temp: 97.9 °F (36.6 °C)  Pulse: 89  BP: (!) 140/85  Pain Score: 0-No pain  Height: 5' 4" (162.6 cm)  Weight: 83.5 kg (184 lb)  Body mass index is 31.58 kg/m².    Physical Exam:  Vitals reviewed.    Constitutional: She appears well-developed and well-nourished. No distress.     Eyes: Pupils " are equal, round, and reactive to light. Conjunctivae and EOM are normal.     Cardiovascular: Normal rate, regular rhythm, normal pulses and no edema.     Abdominal: Soft. Bowel sounds are normal.     Skin: Skin displays no rash on trunk and no rash on extremities. Skin displays no lesions on trunk and no lesions on extremities.     Psych/Behavior: She is alert. She is oriented to person, place, and time. She has a normal mood and affect.     Musculoskeletal: Gait is normal.        Neck: Range of motion is full. There is no tenderness. Muscle strength is 5/5. Tone is normal.        Back: Range of motion is full. There is no tenderness. Muscle strength is 5/5. Tone is normal.        Right Upper Extremities: Range of motion is full. There is no tenderness. Muscle strength is 5/5. Tone is normal.        Left Upper Extremities: Range of motion is full. There is no tenderness. Muscle strength is 5/5. Tone is normal.       Right Lower Extremities: Range of motion is full. There is no tenderness. Muscle strength is 5/5. Tone is normal.        Left Lower Extremities: Range of motion is full. There is no tenderness. Muscle strength is 5/5. Tone is normal.     Neurological:        Coordination: She has a normal Romberg Test, normal finger to nose coordination and normal tandem walking coordination.        Sensory: There is no sensory deficit in the trunk. There is no sensory deficit in the extremities.        DTRs: DTRs are DTRS NORMAL AND SYMMETRICnormal and symmetric. She displays no Babinski's sign on the right side. She displays no Babinski's sign on the left side.        Cranial nerves: Cranial nerve(s) II, III, IV, V, VI, VII, VIII, IX, X, XI and XII are intact.       Diagnostic Results:  She has an MRI of the brain with and without contrast available for review which I personally reviewed.  This redemonstrates her known pituitary macroadenoma.  When compared to the last MRI from 1 year ago, this appears decreased in  size with less mass effect on the left side of the optic chiasm.  It now measures 2.4 x 2.4 x 2.4 cm.  It previously measured 2.5 x 2.5 x 2.6 cm.    ASSESSMENT/PLAN:     Ms. Forrester is a 67-year-old female status post transnasal, transsphenoidal resection of pituitary adenoma in 2018.  She was status post gamma knife surgery of residual pituitary adenoma in the left cavernous sinus and 2021.  Overall, she continues to do well.  The residual adenoma continues to decreased in size as evidenced on the last MRI with and without contrast of the brain.  Since it continues to decrease, I am okay extending her follow-up to every year.  Therefore, I will plan to see her back in 1 year with a repeat MRI with and without contrast of the brain at that time.  She knows she can call with any further questions or concerns in the meantime.          Note dictated with voice recognition software, please excuse any grammatical errors.

## 2024-07-26 ENCOUNTER — TELEPHONE (OUTPATIENT)
Dept: NEUROSURGERY | Facility: CLINIC | Age: 69
End: 2024-07-26
Payer: MEDICARE

## 2024-07-26 ENCOUNTER — PATIENT MESSAGE (OUTPATIENT)
Dept: NEUROSURGERY | Facility: CLINIC | Age: 69
End: 2024-07-26
Payer: MEDICARE

## 2024-07-26 NOTE — TELEPHONE ENCOUNTER
Called pt and lvm to schedule 1 yr f/u appt.     Racquel Chandler MA  [facility]  Neurosurgery.

## 2024-08-06 ENCOUNTER — TELEPHONE (OUTPATIENT)
Dept: NEUROSURGERY | Facility: CLINIC | Age: 69
End: 2024-08-06
Payer: MEDICARE

## 2024-09-04 ENCOUNTER — TELEPHONE (OUTPATIENT)
Dept: NEUROSURGERY | Facility: CLINIC | Age: 69
End: 2024-09-04
Payer: MEDICARE

## 2024-09-04 NOTE — TELEPHONE ENCOUNTER
Called pt advising that we have not received her mri on disc via mail yet and that it would be best to reschedule. Pt agreed and we rescheduled on Monday 9/9 as a virtual visit. I also advised that if we receive the disc Friday I will call for confirmation. Pt stated that she will track the disc tomorrow and call with an update.     Racquel Chandler MA  [facility]  Neurosurgery.

## 2024-09-06 ENCOUNTER — TELEPHONE (OUTPATIENT)
Dept: NEUROSURGERY | Facility: CLINIC | Age: 69
End: 2024-09-06
Payer: MEDICARE

## 2024-09-06 NOTE — TELEPHONE ENCOUNTER
Called pt and advised her that she is all set for Monday I have her disc scanned in.     Racquel Chandler MA  [facility]  Neurosurgery.

## 2024-09-28 ENCOUNTER — PATIENT MESSAGE (OUTPATIENT)
Dept: NEUROSURGERY | Facility: CLINIC | Age: 69
End: 2024-09-28
Payer: MEDICARE

## 2024-10-11 ENCOUNTER — TELEPHONE (OUTPATIENT)
Dept: NEUROSURGERY | Facility: CLINIC | Age: 69
End: 2024-10-11
Payer: MEDICARE

## 2024-10-11 ENCOUNTER — PATIENT MESSAGE (OUTPATIENT)
Dept: NEUROSURGERY | Facility: CLINIC | Age: 69
End: 2024-10-11
Payer: MEDICARE

## 2024-10-11 NOTE — TELEPHONE ENCOUNTER
Spoke with pt and rescheduled missed appt for 10/28.     Racquel Chandler MA  [facility]  Neurosurgery.

## 2024-10-28 ENCOUNTER — OFFICE VISIT (OUTPATIENT)
Dept: SPINE | Facility: CLINIC | Age: 69
End: 2024-10-28
Attending: NEUROLOGICAL SURGERY
Payer: MEDICARE

## 2024-10-28 DIAGNOSIS — D35.2 PITUITARY MACROADENOMA: ICD-10-CM

## 2024-10-28 DIAGNOSIS — E89.3 STATUS POST TRANSSPHENOIDAL PITUITARY RESECTION: ICD-10-CM

## 2024-10-28 DIAGNOSIS — D49.7 PITUITARY TUMOR: Primary | ICD-10-CM

## 2024-10-28 PROCEDURE — 1159F MED LIST DOCD IN RCRD: CPT | Mod: CPTII,95,, | Performed by: NEUROLOGICAL SURGERY

## 2024-10-28 PROCEDURE — 4010F ACE/ARB THERAPY RXD/TAKEN: CPT | Mod: CPTII,95,, | Performed by: NEUROLOGICAL SURGERY

## 2024-10-28 PROCEDURE — 99214 OFFICE O/P EST MOD 30 MIN: CPT | Mod: 95,,, | Performed by: NEUROLOGICAL SURGERY

## 2024-10-28 PROCEDURE — 1160F RVW MEDS BY RX/DR IN RCRD: CPT | Mod: CPTII,95,, | Performed by: NEUROLOGICAL SURGERY

## 2025-08-26 ENCOUNTER — TELEPHONE (OUTPATIENT)
Dept: NEUROSURGERY | Facility: CLINIC | Age: 70
End: 2025-08-26
Payer: MEDICARE

## (undated) DEVICE — TRAY FOLEY 16FR INFECTION CONT

## (undated) DEVICE — KIT SURGIFLO HEMOSTATIC MATRIX

## (undated) DEVICE — SEE MEDLINE ITEM 146372

## (undated) DEVICE — BLADE 4IN EDGE INSULATED

## (undated) DEVICE — DIFFUSER

## (undated) DEVICE — BLADE SURG CARBON STEEL SZ11

## (undated) DEVICE — DURAPREP SURG SCRUB 26ML

## (undated) DEVICE — SEE MEDLINE ITEM 157150

## (undated) DEVICE — TUBE FRAZIER 5MM 2FT SOFT TIP

## (undated) DEVICE — MEROCEL NASAL 2000 8X1.5X2CM

## (undated) DEVICE — SEE MEDLINE ITEM 156905

## (undated) DEVICE — KIT SURGIFLO EVITHROM

## (undated) DEVICE — DRESSING TELFA STRL 4X3 LF

## (undated) DEVICE — NDL SPINAL SPINOCAN 22GX3.5

## (undated) DEVICE — HEMOSTAT SURGICEL 4X8IN

## (undated) DEVICE — DRESSING TRANS 4X4 TEGADERM

## (undated) DEVICE — CONTAINER SPECIMEN STRL 4OZ

## (undated) DEVICE — SPONGE PATTY SURGICAL .5X3IN

## (undated) DEVICE — CARTRIDGE OIL

## (undated) DEVICE — DRAPE C ARM 42 X 120 10/BX

## (undated) DEVICE — DRAPE OPMI STERILE

## (undated) DEVICE — SEE MEDLINE ITEM 146292

## (undated) DEVICE — HUM BUBBLE 350ML 0-6L

## (undated) DEVICE — NDL SPINAL 18GX3.5 SPINOCAN

## (undated) DEVICE — DRAPE STERI INSTRUMENT 1018

## (undated) DEVICE — DRAPE THYROID WITH ARMBOARD

## (undated) DEVICE — DRESSING SURGICAL 1/2X1/2

## (undated) DEVICE — CORD BIPOLAR 12 FOOT

## (undated) DEVICE — STAPLER SKIN PROXIMATE WIDE

## (undated) DEVICE — ELECTRODE REM PLYHSV RETURN 9

## (undated) DEVICE — SEE MEDLINE ITEM 152622

## (undated) DEVICE — SPONGE NEURO 1/4X1/4